# Patient Record
Sex: FEMALE | Race: OTHER | HISPANIC OR LATINO | URBAN - METROPOLITAN AREA
[De-identification: names, ages, dates, MRNs, and addresses within clinical notes are randomized per-mention and may not be internally consistent; named-entity substitution may affect disease eponyms.]

---

## 2018-08-30 ENCOUNTER — INPATIENT (INPATIENT)
Facility: HOSPITAL | Age: 33
LOS: 4 days | Discharge: HOME | DRG: 683 | End: 2018-09-04
Attending: INTERNAL MEDICINE | Admitting: HOSPITALIST
Payer: MEDICAID

## 2018-08-30 VITALS — WEIGHT: 113.1 LBS

## 2018-08-30 LAB
ALBUMIN SERPL ELPH-MCNC: 4.1 G/DL — SIGNIFICANT CHANGE UP (ref 3.3–5.2)
ALP SERPL-CCNC: 75 U/L — SIGNIFICANT CHANGE UP (ref 40–120)
ALT FLD-CCNC: 12 U/L — SIGNIFICANT CHANGE UP
ANION GAP SERPL CALC-SCNC: 15 MMOL/L — SIGNIFICANT CHANGE UP (ref 5–17)
APTT BLD: 31.6 SEC — SIGNIFICANT CHANGE UP (ref 27.5–37.4)
AST SERPL-CCNC: 9 U/L — SIGNIFICANT CHANGE UP
BASOPHILS # BLD AUTO: 0 K/UL — SIGNIFICANT CHANGE UP (ref 0–0.2)
BASOPHILS NFR BLD AUTO: 0.2 % — SIGNIFICANT CHANGE UP (ref 0–2)
BILIRUB SERPL-MCNC: 0.2 MG/DL — LOW (ref 0.4–2)
BUN SERPL-MCNC: 65 MG/DL — HIGH (ref 8–20)
CALCIUM SERPL-MCNC: 8.8 MG/DL — SIGNIFICANT CHANGE UP (ref 8.6–10.2)
CHLORIDE SERPL-SCNC: 102 MMOL/L — SIGNIFICANT CHANGE UP (ref 98–107)
CK SERPL-CCNC: 118 U/L — SIGNIFICANT CHANGE UP (ref 25–170)
CO2 SERPL-SCNC: 18 MMOL/L — LOW (ref 22–29)
CREAT SERPL-MCNC: 6.05 MG/DL — HIGH (ref 0.5–1.3)
EOSINOPHIL # BLD AUTO: 0 K/UL — SIGNIFICANT CHANGE UP (ref 0–0.5)
EOSINOPHIL NFR BLD AUTO: 0.7 % — SIGNIFICANT CHANGE UP (ref 0–6)
GLUCOSE SERPL-MCNC: 95 MG/DL — SIGNIFICANT CHANGE UP (ref 70–115)
HCT VFR BLD CALC: 24.9 % — LOW (ref 37–47)
HGB BLD-MCNC: 8.1 G/DL — LOW (ref 12–16)
INR BLD: 1.05 RATIO — SIGNIFICANT CHANGE UP (ref 0.88–1.16)
LYMPHOCYTES # BLD AUTO: 1.5 K/UL — SIGNIFICANT CHANGE UP (ref 1–4.8)
LYMPHOCYTES # BLD AUTO: 36 % — SIGNIFICANT CHANGE UP (ref 20–55)
MCHC RBC-ENTMCNC: 27.1 PG — SIGNIFICANT CHANGE UP (ref 27–31)
MCHC RBC-ENTMCNC: 32.5 G/DL — SIGNIFICANT CHANGE UP (ref 32–36)
MCV RBC AUTO: 83.3 FL — SIGNIFICANT CHANGE UP (ref 81–99)
MONOCYTES # BLD AUTO: 0.6 K/UL — SIGNIFICANT CHANGE UP (ref 0–0.8)
MONOCYTES NFR BLD AUTO: 13.5 % — HIGH (ref 3–10)
NEUTROPHILS # BLD AUTO: 2 K/UL — SIGNIFICANT CHANGE UP (ref 1.8–8)
NEUTROPHILS NFR BLD AUTO: 49.6 % — SIGNIFICANT CHANGE UP (ref 37–73)
NT-PROBNP SERPL-SCNC: 1712 PG/ML — HIGH (ref 0–300)
PLATELET # BLD AUTO: 165 K/UL — SIGNIFICANT CHANGE UP (ref 150–400)
POTASSIUM SERPL-MCNC: 5.4 MMOL/L — HIGH (ref 3.5–5.3)
POTASSIUM SERPL-SCNC: 5.4 MMOL/L — HIGH (ref 3.5–5.3)
PROT SERPL-MCNC: 7.5 G/DL — SIGNIFICANT CHANGE UP (ref 6.6–8.7)
PROTHROM AB SERPL-ACNC: 11.6 SEC — SIGNIFICANT CHANGE UP (ref 9.8–12.7)
RBC # BLD: 2.99 M/UL — LOW (ref 4.4–5.2)
RBC # FLD: 14.1 % — SIGNIFICANT CHANGE UP (ref 11–15.6)
SODIUM SERPL-SCNC: 135 MMOL/L — SIGNIFICANT CHANGE UP (ref 135–145)
TROPONIN T SERPL-MCNC: <0.01 NG/ML — SIGNIFICANT CHANGE UP (ref 0–0.06)
WBC # BLD: 4.1 K/UL — LOW (ref 4.8–10.8)
WBC # FLD AUTO: 4.1 K/UL — LOW (ref 4.8–10.8)

## 2018-08-30 PROCEDURE — 99223 1ST HOSP IP/OBS HIGH 75: CPT | Mod: GC

## 2018-08-30 PROCEDURE — 93010 ELECTROCARDIOGRAM REPORT: CPT

## 2018-08-30 PROCEDURE — 99285 EMERGENCY DEPT VISIT HI MDM: CPT

## 2018-08-30 PROCEDURE — 71045 X-RAY EXAM CHEST 1 VIEW: CPT | Mod: 26

## 2018-08-30 RX ORDER — HYDRALAZINE HCL 50 MG
10 TABLET ORAL ONCE
Qty: 0 | Refills: 0 | Status: COMPLETED | OUTPATIENT
Start: 2018-08-30 | End: 2018-08-30

## 2018-08-30 RX ORDER — DEXTROSE 50 % IN WATER 50 %
50 SYRINGE (ML) INTRAVENOUS ONCE
Qty: 0 | Refills: 0 | Status: COMPLETED | OUTPATIENT
Start: 2018-08-30 | End: 2018-08-30

## 2018-08-30 RX ADMIN — Medication 10 MILLIGRAM(S): at 23:59

## 2018-08-30 NOTE — ED ADULT NURSE NOTE - NSIMPLEMENTINTERV_GEN_ALL_ED
Implemented All Universal Safety Interventions:  [place] to call system. Call bell, personal items and telephone within reach. Instruct patient to call for assistance. Room bathroom lighting operational. Non-slip footwear when patient is off stretcher. Physically safe environment: no spills, clutter or unnecessary equipment. Stretcher in lowest position, wheels locked, appropriate side rails in place.

## 2018-08-30 NOTE — ED ADULT NURSE REASSESSMENT NOTE - NS ED NURSE REASSESS COMMENT FT1
patient denies any complaints of dizziness states that she has a slight headache, patient to be medicated for hypertension. will continue to monitor patient

## 2018-08-30 NOTE — ED STATDOCS - PROGRESS NOTE DETAILS
34 y/o F pt with hx fo HTN presents to ED for abnormal kidney levels. Pt sent by doctor to ED. Pt is newly diagnosed hypertensive and has been on bp medication x 7 days. Pt is seen at clinic which is where prescription was given. Pt went 5 days ago for routine visit. She was called today secondary to bloodwork and told she had abnormality related to kidney function. Protocol orders have been entered following a focused evaluation in intake. Pt to be placed in the main ED for further evaluation by another provider.

## 2018-08-30 NOTE — ED PROVIDER NOTE - PMH
HTN (hypertension) Hypertension, unspecified type    Migraine without aura, not intractable, without status migrainosus

## 2018-08-30 NOTE — ED PROVIDER NOTE - ATTENDING CONTRIBUTION TO CARE
34yo female with pmh of HTN presents sent from PMD for kidney failure. Pt denies fevers/chills, ha, loc, focal neuro deficits, cp/sob/palp, cough, abd pain/n/v/d, urinary symptoms, recent travel and sick contacts. PE - NAD, CV/Resp/Abd/Neuro/Skin wnl. Will re check labs and if pt in [place] will admit

## 2018-08-30 NOTE — ED ADULT TRIAGE NOTE - CHIEF COMPLAINT QUOTE
Sent by clinic because "kidney numbers are not good." Stated went to clinic on 8/23/18, was called today & told to come to ED for further evaluation. Patient A&Ox4, denies any pain or discomfort.

## 2018-08-30 NOTE — ED PROVIDER NOTE - OBJECTIVE STATEMENT
Patient is a 34 y/o female with a pmhx of HTN presenting for evaluation. Patient states she was diagnosed with HTN July 2nd by dr. saunders. Patient states she was started on propanolol, but was recently switched to lisinopril on august 24th due to side effects of propanolol. Patient states she had routine blood work drawn at her last visit and today she received a phone call from her doctor that there was " a problem with her kidney function and to go to the ED". Patient denies taking any other medications than lisinopril. Patient denies cardiac hx.

## 2018-08-30 NOTE — ED ADULT NURSE NOTE - OBJECTIVE STATEMENT
Patient stated she went on 8/23/18 to have checkup and was called today to come to ER "because something is wrong with my kidneys". Denies any symptoms.

## 2018-08-30 NOTE — ED ADULT NURSE NOTE - LANGUAGE ASSISTANCE NEEDED
Yes-Patient/Caregiver accepts free interpretation services... ED  Ashley at bedside to translate/Yes-Patient/Caregiver accepts free interpretation services...

## 2018-08-31 ENCOUNTER — RESULT REVIEW (OUTPATIENT)
Age: 33
End: 2018-08-31

## 2018-08-31 DIAGNOSIS — E87.5 HYPERKALEMIA: ICD-10-CM

## 2018-08-31 DIAGNOSIS — N91.2 AMENORRHEA, UNSPECIFIED: ICD-10-CM

## 2018-08-31 DIAGNOSIS — N19 UNSPECIFIED KIDNEY FAILURE: ICD-10-CM

## 2018-08-31 DIAGNOSIS — Z29.9 ENCOUNTER FOR PROPHYLACTIC MEASURES, UNSPECIFIED: ICD-10-CM

## 2018-08-31 DIAGNOSIS — D64.9 ANEMIA, UNSPECIFIED: ICD-10-CM

## 2018-08-31 DIAGNOSIS — D63.8 ANEMIA IN OTHER CHRONIC DISEASES CLASSIFIED ELSEWHERE: ICD-10-CM

## 2018-08-31 DIAGNOSIS — N17.9 ACUTE KIDNEY FAILURE, UNSPECIFIED: ICD-10-CM

## 2018-08-31 DIAGNOSIS — Z98.51 TUBAL LIGATION STATUS: Chronic | ICD-10-CM

## 2018-08-31 DIAGNOSIS — I10 ESSENTIAL (PRIMARY) HYPERTENSION: ICD-10-CM

## 2018-08-31 LAB
ANION GAP SERPL CALC-SCNC: 16 MMOL/L — SIGNIFICANT CHANGE UP (ref 5–17)
ANION GAP SERPL CALC-SCNC: 17 MMOL/L — SIGNIFICANT CHANGE UP (ref 5–17)
APAP SERPL-MCNC: <7.5 UG/ML — LOW (ref 10–26)
APPEARANCE UR: CLEAR — SIGNIFICANT CHANGE UP
BACTERIA # UR AUTO: ABNORMAL
BILIRUB UR-MCNC: NEGATIVE — SIGNIFICANT CHANGE UP
BUN SERPL-MCNC: 62 MG/DL — HIGH (ref 8–20)
BUN SERPL-MCNC: 62 MG/DL — HIGH (ref 8–20)
CALCIUM SERPL-MCNC: 8.5 MG/DL — LOW (ref 8.6–10.2)
CALCIUM SERPL-MCNC: 9 MG/DL — SIGNIFICANT CHANGE UP (ref 8.6–10.2)
CHLORIDE SERPL-SCNC: 104 MMOL/L — SIGNIFICANT CHANGE UP (ref 98–107)
CHLORIDE SERPL-SCNC: 109 MMOL/L — HIGH (ref 98–107)
CHLORIDE UR-SCNC: 41 MMOL/L — SIGNIFICANT CHANGE UP
CHOLEST SERPL-MCNC: 97 MG/DL — LOW (ref 110–199)
CK SERPL-CCNC: 122 U/L — SIGNIFICANT CHANGE UP (ref 25–170)
CO2 SERPL-SCNC: 18 MMOL/L — LOW (ref 22–29)
CO2 SERPL-SCNC: 18 MMOL/L — LOW (ref 22–29)
COLOR SPEC: YELLOW — SIGNIFICANT CHANGE UP
CREAT ?TM UR-MCNC: 19 MG/DL — SIGNIFICANT CHANGE UP
CREAT SERPL-MCNC: 5.62 MG/DL — HIGH (ref 0.5–1.3)
CREAT SERPL-MCNC: 5.85 MG/DL — HIGH (ref 0.5–1.3)
CRP SERPL-MCNC: <0.4 MG/DL — SIGNIFICANT CHANGE UP (ref 0–0.4)
DIFF PNL FLD: ABNORMAL
EPI CELLS # UR: ABNORMAL
ERYTHROCYTE [SEDIMENTATION RATE] IN BLOOD: 41 MM/HR — HIGH (ref 0–20)
FERRITIN SERPL-MCNC: 58 NG/ML — SIGNIFICANT CHANGE UP (ref 15–150)
FSH SERPL-MCNC: 5.7 IU/L — SIGNIFICANT CHANGE UP
GLUCOSE SERPL-MCNC: 81 MG/DL — SIGNIFICANT CHANGE UP (ref 70–115)
GLUCOSE SERPL-MCNC: 83 MG/DL — SIGNIFICANT CHANGE UP (ref 70–115)
GLUCOSE UR QL: NEGATIVE MG/DL — SIGNIFICANT CHANGE UP
HBA1C BLD-MCNC: 5.2 % — SIGNIFICANT CHANGE UP (ref 4–5.6)
HBV CORE AB SER-ACNC: SIGNIFICANT CHANGE UP
HBV SURFACE AB SER-ACNC: SIGNIFICANT CHANGE UP
HBV SURFACE AG SER-ACNC: SIGNIFICANT CHANGE UP
HCG SERPL-ACNC: <5 MIU/ML — SIGNIFICANT CHANGE UP
HCT VFR BLD CALC: 24.5 % — LOW (ref 37–47)
HCV AB S/CO SERPL IA: 0.2 S/CO — SIGNIFICANT CHANGE UP
HCV AB SERPL-IMP: SIGNIFICANT CHANGE UP
HDLC SERPL-MCNC: 39 MG/DL — LOW
HGB BLD-MCNC: 7.9 G/DL — LOW (ref 12–16)
IRON SATN MFR SERPL: 19 % — SIGNIFICANT CHANGE UP (ref 14–50)
IRON SATN MFR SERPL: 45 UG/DL — SIGNIFICANT CHANGE UP (ref 37–145)
KETONES UR-MCNC: NEGATIVE — SIGNIFICANT CHANGE UP
LDH SERPL L TO P-CCNC: 206 U/L — HIGH (ref 98–192)
LEUKOCYTE ESTERASE UR-ACNC: NEGATIVE — SIGNIFICANT CHANGE UP
LH SERPL-ACNC: 3.6 IU/L — SIGNIFICANT CHANGE UP
LIPID PNL WITH DIRECT LDL SERPL: 45 MG/DL — SIGNIFICANT CHANGE UP
MAGNESIUM SERPL-MCNC: 2.3 MG/DL — SIGNIFICANT CHANGE UP (ref 1.6–2.6)
MCHC RBC-ENTMCNC: 27 PG — SIGNIFICANT CHANGE UP (ref 27–31)
MCHC RBC-ENTMCNC: 32.2 G/DL — SIGNIFICANT CHANGE UP (ref 32–36)
MCV RBC AUTO: 83.6 FL — SIGNIFICANT CHANGE UP (ref 81–99)
NITRITE UR-MCNC: NEGATIVE — SIGNIFICANT CHANGE UP
NT-PROBNP SERPL-SCNC: 1983 PG/ML — HIGH (ref 0–300)
OSMOLALITY UR: 175 MOSM/KG — LOW (ref 300–1000)
PH UR: 7 — SIGNIFICANT CHANGE UP (ref 5–8)
PHOSPHATE SERPL-MCNC: 4.5 MG/DL — SIGNIFICANT CHANGE UP (ref 2.4–4.7)
PLATELET # BLD AUTO: 168 K/UL — SIGNIFICANT CHANGE UP (ref 150–400)
POTASSIUM SERPL-MCNC: 4.9 MMOL/L — SIGNIFICANT CHANGE UP (ref 3.5–5.3)
POTASSIUM SERPL-MCNC: 5.2 MMOL/L — SIGNIFICANT CHANGE UP (ref 3.5–5.3)
POTASSIUM SERPL-SCNC: 4.9 MMOL/L — SIGNIFICANT CHANGE UP (ref 3.5–5.3)
POTASSIUM SERPL-SCNC: 5.2 MMOL/L — SIGNIFICANT CHANGE UP (ref 3.5–5.3)
PROLACTIN SERPL-MCNC: 198.3 NG/ML — HIGH (ref 3.4–24.1)
PROT ?TM UR-MCNC: 142 MG/DL — HIGH (ref 0–12)
PROT UR-MCNC: 100 MG/DL
PROT/CREAT UR-RTO: 7.5 RATIO — HIGH
RBC # BLD: 2.93 M/UL — LOW (ref 4.4–5.2)
RBC # FLD: 14 % — SIGNIFICANT CHANGE UP (ref 11–15.6)
RBC CASTS # UR COMP ASSIST: ABNORMAL /HPF (ref 0–4)
SODIUM SERPL-SCNC: 138 MMOL/L — SIGNIFICANT CHANGE UP (ref 135–145)
SODIUM SERPL-SCNC: 144 MMOL/L — SIGNIFICANT CHANGE UP (ref 135–145)
SODIUM UR-SCNC: 49 MMOL/L — SIGNIFICANT CHANGE UP
SP GR SPEC: 1 — LOW (ref 1.01–1.02)
T3 SERPL-MCNC: 88 NG/DL — SIGNIFICANT CHANGE UP (ref 80–200)
T4 AB SER-ACNC: 5.6 UG/DL — SIGNIFICANT CHANGE UP (ref 4.5–12)
TIBC SERPL-MCNC: 209 UG/DL — LOW (ref 220–430)
TOTAL CHOLESTEROL/HDL RATIO MEASUREMENT: 2 RATIO — LOW (ref 3.3–7.1)
TRANSFERRIN SERPL-MCNC: 142 MG/DL — LOW (ref 192–382)
TRIGL SERPL-MCNC: 66 MG/DL — SIGNIFICANT CHANGE UP (ref 10–200)
TSH SERPL-MCNC: 2.15 UIU/ML — SIGNIFICANT CHANGE UP (ref 0.27–4.2)
UROBILINOGEN FLD QL: NEGATIVE MG/DL — SIGNIFICANT CHANGE UP
WBC # BLD: 3.8 K/UL — LOW (ref 4.8–10.8)
WBC # FLD AUTO: 3.8 K/UL — LOW (ref 4.8–10.8)
WBC UR QL: SIGNIFICANT CHANGE UP

## 2018-08-31 PROCEDURE — 88305 TISSUE EXAM BY PATHOLOGIST: CPT | Mod: 26

## 2018-08-31 PROCEDURE — 70551 MRI BRAIN STEM W/O DYE: CPT | Mod: 26

## 2018-08-31 PROCEDURE — 88350 IMFLUOR EA ADDL 1ANTB STN PX: CPT | Mod: 26

## 2018-08-31 PROCEDURE — 76770 US EXAM ABDO BACK WALL COMP: CPT | Mod: 26

## 2018-08-31 PROCEDURE — 88346 IMFLUOR 1ST 1ANTB STAIN PX: CPT | Mod: 26

## 2018-08-31 PROCEDURE — 99233 SBSQ HOSP IP/OBS HIGH 50: CPT | Mod: GC

## 2018-08-31 PROCEDURE — 99223 1ST HOSP IP/OBS HIGH 75: CPT

## 2018-08-31 PROCEDURE — 88313 SPECIAL STAINS GROUP 2: CPT | Mod: 26

## 2018-08-31 PROCEDURE — 88312 SPECIAL STAINS GROUP 1: CPT | Mod: 26

## 2018-08-31 PROCEDURE — 88348 ELECTRON MICROSCOPY DX: CPT | Mod: 26

## 2018-08-31 RX ORDER — ACETAMINOPHEN 500 MG
650 TABLET ORAL EVERY 6 HOURS
Qty: 0 | Refills: 0 | Status: DISCONTINUED | OUTPATIENT
Start: 2018-08-31 | End: 2018-09-04

## 2018-08-31 RX ORDER — SODIUM BICARBONATE 1 MEQ/ML
0.18 SYRINGE (ML) INTRAVENOUS
Qty: 75 | Refills: 0 | Status: DISCONTINUED | OUTPATIENT
Start: 2018-08-31 | End: 2018-09-04

## 2018-08-31 RX ORDER — FERROUS SULFATE 325(65) MG
325 TABLET ORAL DAILY
Qty: 0 | Refills: 0 | Status: DISCONTINUED | OUTPATIENT
Start: 2018-08-31 | End: 2018-09-04

## 2018-08-31 RX ORDER — SODIUM CHLORIDE 9 MG/ML
1000 INJECTION, SOLUTION INTRAVENOUS
Qty: 0 | Refills: 0 | Status: DISCONTINUED | OUTPATIENT
Start: 2018-08-31 | End: 2018-08-31

## 2018-08-31 RX ORDER — INSULIN HUMAN 100 [IU]/ML
2 INJECTION, SOLUTION SUBCUTANEOUS ONCE
Qty: 0 | Refills: 0 | Status: COMPLETED | OUTPATIENT
Start: 2018-08-31 | End: 2018-08-31

## 2018-08-31 RX ORDER — CALCIUM GLUCONATE 100 MG/ML
1 VIAL (ML) INTRAVENOUS ONCE
Qty: 0 | Refills: 0 | Status: COMPLETED | OUTPATIENT
Start: 2018-08-31 | End: 2018-08-31

## 2018-08-31 RX ORDER — INSULIN HUMAN 100 [IU]/ML
2 INJECTION, SOLUTION SUBCUTANEOUS ONCE
Qty: 0 | Refills: 0 | Status: DISCONTINUED | OUTPATIENT
Start: 2018-08-31 | End: 2018-08-31

## 2018-08-31 RX ORDER — AMLODIPINE BESYLATE 2.5 MG/1
10 TABLET ORAL DAILY
Qty: 0 | Refills: 0 | Status: DISCONTINUED | OUTPATIENT
Start: 2018-08-31 | End: 2018-09-04

## 2018-08-31 RX ORDER — SODIUM CHLORIDE 9 MG/ML
1000 INJECTION, SOLUTION INTRAVENOUS ONCE
Qty: 0 | Refills: 0 | Status: COMPLETED | OUTPATIENT
Start: 2018-08-31 | End: 2018-08-31

## 2018-08-31 RX ORDER — OXYCODONE AND ACETAMINOPHEN 5; 325 MG/1; MG/1
2 TABLET ORAL EVERY 6 HOURS
Qty: 0 | Refills: 0 | Status: DISCONTINUED | OUTPATIENT
Start: 2018-08-31 | End: 2018-09-04

## 2018-08-31 RX ADMIN — Medication 50 MILLILITER(S): at 00:31

## 2018-08-31 RX ADMIN — Medication 125 MEQ/KG/HR: at 22:48

## 2018-08-31 RX ADMIN — OXYCODONE AND ACETAMINOPHEN 2 TABLET(S): 5; 325 TABLET ORAL at 19:47

## 2018-08-31 RX ADMIN — INSULIN HUMAN 2 UNIT(S): 100 INJECTION, SOLUTION SUBCUTANEOUS at 00:31

## 2018-08-31 RX ADMIN — SODIUM CHLORIDE 2000 MILLILITER(S): 9 INJECTION, SOLUTION INTRAVENOUS at 02:07

## 2018-08-31 RX ADMIN — Medication 125 MEQ/KG/HR: at 12:01

## 2018-08-31 RX ADMIN — SODIUM CHLORIDE 150 MILLILITER(S): 9 INJECTION, SOLUTION INTRAVENOUS at 04:12

## 2018-08-31 RX ADMIN — OXYCODONE AND ACETAMINOPHEN 2 TABLET(S): 5; 325 TABLET ORAL at 18:42

## 2018-08-31 NOTE — H&P ADULT - NSHPSOCIALHISTORY_GEN_ALL_CORE
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  Denies any alcohol, tobacco or illicit drug use.  LMP: 04/2018, s/p BTL

## 2018-08-31 NOTE — CONSULT NOTE ADULT - SUBJECTIVE AND OBJECTIVE BOX
[facility] DIVISION OF KIDNEY DISEASES AND HYPERTENSION -- INITIAL CONSULT NOTE  --------------------------------------------------------------------------------  HPI:  Pt is a 33 year old female with history of HTN sent from [facility] [place] for elevated Cr. Patient noted to have BP of 187/109 ; stopped taking her propranolol due to dry mouth symptoms; mifraines; then started on lisinopril 10mg daily. Patient was called and told to come to ER upon blood work. Pt at this time was also taking ibuprofen for her migraines during this time. Patient seen and examined; hensley catheter placed; no complaints; comfortable.    PAST HISTORY  --------------------------------------------------------------------------------  PAST MEDICAL & SURGICAL HISTORY:  Hypertension, unspecified type  Migraine without aura, not intractable, without status migrainosus  History of bilateral tubal ligation    FAMILY HISTORY:  Family history of hypertension (Mother)    PAST SOCIAL HISTORY:    ALLERGIES & MEDICATIONS  --------------------------------------------------------------------------------  Allergies    No Known Allergies    Intolerances      Standing Inpatient Medications  amLODIPine   Tablet 10 milliGRAM(s) Oral daily  sodium bicarbonate  Infusion 0.183 mEq/kG/Hr IV Continuous <Continuous>    PRN Inpatient Medications      REVIEW OF SYSTEMS  --------------------------------------------------------------------------------  Gen: No weight changes, fatigue, fevers/chills, weakness  Skin: No rashes  Head/Eyes/Ears/Mouth: No headache; Normal hearing; Normal vision w/o blurriness; No sinus pain/discomfort, sore throat  Respiratory: No dyspnea, cough, wheezing, hemoptysis  CV: No chest pain, PND, orthopnea  GI: No abdominal pain, diarrhea, constipation, nausea, vomiting, melena, hematochezia  : No increased frequency, dysuria, hematuria, nocturia  MSK: No joint pain/swelling; no back pain; no edema  Neuro: No dizziness/lightheadedness, weakness, seizures, numbness, tingling  Heme: No easy bruising or bleeding  Endo: No heat/cold intolerance  Psych: No significant nervousness, anxiety, stress, depression    All other systems were reviewed and are negative, except as noted.    VITALS/PHYSICAL EXAM  --------------------------------------------------------------------------------  T(C): 37.1 (08-31-18 @ 07:36), Max: 37.1 (08-31-18 @ 07:36)  HR: 68 (08-31-18 @ 07:36) (68 - 102)  BP: 119/74 (08-31-18 @ 07:36) (119/74 - 176/111)  RR: 18 (08-31-18 @ 07:36) (18 - 21)  SpO2: 100% (08-31-18 @ 07:36) (100% - 100%)  Wt(kg): --    Weight (kg): 51.3 (08-30-18 @ 20:09)      08-30-18 @ 07:01  -  08-31-18 @ 07:00  --------------------------------------------------------  IN: 1600 mL / OUT: 1300 mL / NET: 300 mL      Physical Exam:  	Gen: NAD, well-appearing  	HEENT: PERRL, supple neck, clear oropharynx  	Pulm: CTA B/L  	CV: RRR, S1S2; no rub  	Back: No spinal or CVA tenderness; no sacral edema  	Abd: +BS, soft, nontender/nondistended  	: hensley+   	UE: Warm, FROM, no clubbing, intact strength; no edema; no asterixis  	LE: Warm, FROM, no clubbing, intact strength; no edema  	Neuro: No focal deficits, intact gait  	Psych: Normal affect and mood  	Skin: Warm, without rashes  	Vascular access:    LABS/STUDIES  --------------------------------------------------------------------------------              8.1    4.1   >-----------<  165      [08-30-18 @ 22:42]              24.9     138  |  104  |  62.0  ----------------------------<  83      [08-31-18 @ 06:22]  5.2   |  18.0  |  5.62        Ca     8.5     [08-31-18 @ 06:22]      Mg     2.3     [08-31-18 @ 06:22]      Phos  4.5     [08-31-18 @ 06:22]    TPro  7.5  /  Alb  4.1  /  TBili  0.2  /  DBili  x   /  AST  9   /  ALT  12  /  AlkPhos  75  [08-30-18 @ 22:42]    PT/INR: PT 11.6 , INR 1.05       [08-30-18 @ 22:42]  PTT: 31.6       [08-30-18 @ 22:42]    Troponin <0.01      [08-30-18 @ 22:42]        [08-31-18 @ 06:22]    Creatinine Trend:  SCr 5.62 [08-31 @ 06:22]  SCr 6.05 [08-30 @ 22:42]    Urinalysis - [08-31-18 @ 02:25]      Color Yellow / Appearance Clear / SG 1.005 / pH 7.0      Gluc Negative / Ketone Negative  / Bili Negative / Urobili Negative       Blood Moderate / Protein 100 / Leuk Est Negative / Nitrite Negative      RBC 6-10 / WBC 0-2 / Hyaline  / Gran  / Sq Epi  / Non Sq Epi Moderate / Bacteria Few    Urine Creatinine 19      [08-31-18 @ 02:26]  Urine Protein 142.0      [08-31-18 @ 02:26]  Urine Sodium 49      [08-31-18 @ 02:24]  Urine Chloride 41      [08-31-18 @ 02:24]  Urine Osmolality 175      [08-31-18 @ 02:25]    Iron 45, TIBC 209, %sat 19      [08-31-18 @ 06:22]  Ferritin 58      [08-31-18 @ 06:22]  HbA1c 5.2      [08-31-18 @ 06:22]  TSH 2.15      [08-31-18 @ 06:22]  Lipid: chol 97, TG 66, HDL 39, LDL 45      [08-31-18 @ 06:22]

## 2018-08-31 NOTE — ED ADULT NURSE REASSESSMENT NOTE - NS ED NURSE REASSESS COMMENT FT1
[facility] placed by JEAN MARIE Quinones per MD order, Report given to Lisa AJ in holding room, patient to be transferred to

## 2018-08-31 NOTE — ED ADULT NURSE REASSESSMENT NOTE - NS ED NURSE REASSESS COMMENT FT1
Dr. Bethea at bedside, hensley catheter placed by JEAN MARIE Quinones and Dr. Bethea, Normosol bolus infusing, patient resting comfortably at this time, will continue to monitor patient

## 2018-08-31 NOTE — H&P ADULT - PROBLEM SELECTOR PLAN 2
- Hold home Lisinopril at the moment.  - Will add amlodipine 5 mg PO and titrate as needed. - Hold home Lisinopril at the moment.  - s/p x1 dose of Hydralazine 10mg IVP at the ED  - Will add amlodipine 10 mg PO and titrate as needed. - Hold home Lisinopril at the moment.  - s/p x1 dose of Hydralazine 10mg IVP at the ED  - Amlodipine 10 mg PO and titrate as needed.  - Patient's history of new onset self resolving episodic headaches, raises the possibility of pheochromocytoma, will follow up results of urine metanephrines.

## 2018-08-31 NOTE — PROGRESS NOTE ADULT - ASSESSMENT
Patient is a 33 years old female with PMHx of recently diagnosed hypertension and migraines treated with Propanolol for two weeks, is referred from [facility] due to elevated creatinine, concerning of acute on chronic kidney failure.    Workup id done to r/o vasculitis in the context of a your patient with hypertension and kidney injury w/o evident risk factors or cause. The patient has shown slow improvement of renal function and renal bx was performed for further analysis. Patient presented mild alice incisional haemorrhagia after the procedure, will follow closely.  Nephrology is following the case. Slow clinical improvement      1. Acute on chronic kidney disease  - BUN:Cr ratio 10.7, likely primary renal in origin.  - Vasculitis concern ESR IS ELEVATED -->F/u ANCA, ALBA, antidsDNA, CRP  - c/w Gentle hydration.  - Hyperkalemia management as below.  - Anemia management as below.  -S/P renal Bx -->F/U results  - F/U Renal doppler US in am  - U/A with microscopy.  - Hold nephrotoxic medications.  -c/w DASH/TLC diet with Renal restrictions.  -- Nephrology consultation noted and appreciated      2.  Hypertension  - Improving  -Holding Lisinopril at the moment.  - s/p x1 dose of Hydralazine 10mg IVP at the ED  - Amlodipine 10 mg PO and titrate as needed.  - -f/u results of urine metanephrines.     3.Hyperkalemia   - S/p Insulin and 25 mg of Dextrose given in the ED.  - EKG T wave changes ot QTc abnormalities  - K+ level <7mEq/L  - Repeat K+, hold on Ca+2 gluconate    4. Prolactinemia  -Prolactin >200  -MRI head shows no lesions. -->will Hold Dynamic pituitary study due kidney disease.  -Will follow up with Endocrinology.     5. Anemia of chronic disease   - Low TIBC, with low-normal Iron, ferritin and trasferrin  -Normocytic normochromic  - Likely secondary to CKD  - f/u Iron studies.       6. Amenorrhea  - Likely secondary to hyperprolactinemia.  - Will trend LH, FSH and prolactin.  - Will need brain imaging as part of investigation for adenoma.     7. DVT prophylaxis  - [place] prediction Score: 0. No need for DVT prophylaxis. 33 years old female with Hx of recently diagnosed hypertension and migraines was treated with Propanolol for two weeks and thereafter prescribed lisinopril due to intolerance of propranolol as well as incidental finding of elevated prolactin level on labs with noted cessation of menstruation since April 2018; pt was referred from [facility] due to elevated creatinine, with concern for acute vs acute on chronic kidney failure unclear etiology. Renal US shows medical renal dz, so possible underlying CKD. Serology for differential diagnosis sent and testing. Nephrology consulted and recommended renal bx, contacted IR and pt pending renal bx.     1. Acute on chronic kidney disease  - pt asymptomatic   - BUN:Cr ratio 10.7  - bun/ cr with slight down trend   - c/w IVF   - Hyperkalemia improved   - c/w monitoring renal function   - Anemia management as below   - F/U Renal doppler US in am   - renal US shows chronic medical renal dz component of ckd   - U/A with microscopy  protein: 100    -protein/cr ratio elevated   - ESR elevated   -CRP wnl   - f/u ALBA, C3/ C4/ C5, ds DNA, metanephrines   - c/w hensley catheter   - Hold nephrotoxic medications   - hepatitis panel (just in case needed for HD placement; will not place PPD unless pt needs HD)   - Nephrology consul noted and appreciated and recommended renal Bx  - IR consulted for bx     Hypertension  - Improving  - Holding Lisinopril at the moment due to LAITH   - c/w Amlodipine 10 mg PO and titrate as needed.    Anemia of chronic disease   - h/h stable   - Low TIBC, with low-normal Iron, ferritin and trasferrin   - given slightly lower ferritin could benefit from iron therapy   - Normocytic normochromic  -c/w ferrous sulfate 325mg po qd   - epogen as per nephro     Amenorrhea  - Likely secondary to hyperprolactinemia  - Will trend LH, FSH and prolactin - labs testing   - MRI brain with no evidence of pituitary adenoma - recommends dynamic pituitary study with contrast but pt is not able to receive contrast due to LAITH.   - Will consult endocrinology     DVT prophylaxis  - Pt is ambulating   - scd boots b/l       Dispo: When pt is medically stable to return to home. Based on renal functioning and if worsens need for HD may need community HD center. 33 years old female with Hx of recently diagnosed hypertension and migraines was treated with Propanolol for two weeks and thereafter prescribed lisinopril due to intolerance of propranolol as well as incidental finding of elevated prolactin level on labs with noted cessation of menstruation since April 2018; pt was referred from [facility] due to elevated creatinine, with concern for acute vs acute on chronic kidney failure unclear etiology. Renal US shows medical renal dz, so possible underlying CKD. Serology for differential diagnosis sent and testing. Nephrology consulted and recommended renal bx, contacted IR and pt pending renal bx.     Acute on chronic kidney disease  - pt asymptomatic   - BUN:Cr ratio 10.7  - bun/ cr with slight down trend   - c/w IVF   - Hyperkalemia improved   - c/w monitoring renal function   - Anemia management as below   - F/U Renal doppler US in am   - renal US shows chronic medical renal dz component of ckd   - U/A with microscopy  protein: 100    -protein/cr ratio elevated   - ESR elevated   -CRP wnl   - f/u ALBA, C3/ C4/ C5, ds DNA, metanephrines   - c/w hensley catheter   - Hold nephrotoxic medications   - hepatitis panel (just in case needed for HD placement; will not place PPD unless pt needs HD)   - Nephrology consul noted and appreciated and recommended renal Bx  - IR consulted for bx     Hypertension  - Improving  - Holding Lisinopril at the moment due to LAITH   - c/w Amlodipine 10 mg PO and titrate as needed.    Anemia of chronic disease   - h/h stable   - Low TIBC, with low-normal Iron, ferritin and trasferrin   - given slightly lower ferritin could benefit from iron therapy   - Normocytic normochromic  -c/w ferrous sulfate 325mg po qd   - epogen as per nephro     Amenorrhea  - Likely secondary to hyperprolactinemia  - Will trend LH, FSH and prolactin - labs testing   - MRI brain with no evidence of pituitary adenoma - recommends dynamic pituitary study with contrast but pt is not able to receive contrast due to LAITH.   - Will consult endocrinology     Migraine   -c/w tylenol prn   -currently pt is asx     DVT prophylaxis  - Pt is ambulating   - scd boots b/l       Dispo: When pt is medically stable to return to home. Based on renal functioning and if worsens need for HD may need community HD center.

## 2018-08-31 NOTE — H&P ADULT - PROBLEM SELECTOR PLAN 4
- Normocytic normochromic  - Likely secondary to CKD  - Iron studies.  - Type and Screen - Normocytic normochromic  - Likely secondary to CKD  - f/u Iron studies.  - Type and Screen

## 2018-08-31 NOTE — H&P ADULT - NSHPPHYSICALEXAM_GEN_ALL_CORE
Vital Signs Last 24 Hrs  T(C): 36.8 (30 Aug 2018 21:07), Max: 36.8 (30 Aug 2018 21:07)  T(F): 98.2 (30 Aug 2018 21:07), Max: 98.2 (30 Aug 2018 21:07)  HR: 96 (31 Aug 2018 00:35) (78 - 96)  BP: 169/79 (31 Aug 2018 01:01) (169/79 - 176/111)  RR: 20 (31 Aug 2018 01:01) (18 - 21)  SpO2: 100% (31 Aug 2018 01:01) (100% - 100%)    General: Patient is well-groomed, well-developed female, resting comfortably in bed in no acute distress.  HEENT: Normocephalic, atraumatic, nose is clear, moist mucous membranes.  Neck: No JVD, no carotid bruit.  Respiratory: Normal respiratory rate and effort, lungs are clear to auscultation bilaterally.  Cardiac: Regular rate and rhythm, no murmurs, rubs or gallops.  GI: Abdomen is soft, nontender, nondistended, bowel sounds normal.  Extremities: No cyanosis, no edema, +2 pedal pulses.  Neurologic: Patient is alert and oriented x4, CN II-XII grossly normal, strength and sensation preserved.  Psych: Normal speech and affect, good eye contact.

## 2018-08-31 NOTE — PROGRESS NOTE ADULT - SUBJECTIVE AND OBJECTIVE BOX
cc: Patient is a 33y old  Female who was referred for elevated creatinine     HOSPITALIZATION DAY: 1    INTERVAL/OVERNIGHT EVENTS:    Patient examined at beside. No acute events over night.  Patient refers felling well . Patient is tolerating PO diet w/o nausea/vomiting/diarrhea/abdominal pain. Patient is on hensley catheter, draining clean urine. Patient denies chest pain, calf pain, abdominal pain, headaches, fever, chills, dysuria, hematuria, diarrhea, constipation, SOB, palpitations. Rest of ROS not contributory except for above.      Vitals  T(C): 36.7 (08-31-18 @ 15:35), Max: 37.1 (08-31-18 @ 07:36)  HR: 107 (08-31-18 @ 15:35) (68 - 107)  BP: 154/88 (08-31-18 @ 15:35) (119/74 - 176/111)  RR: 18 (08-31-18 @ 15:35) (18 - 21)  SpO2: 100% (08-31-18 @ 15:35) (100% - 100%)    Physical Exam:   GENERAL: NAD, well-groomed, well-developed. Cooperative.  HEAD:  Atraumatic, Normocephalic.  EYES: EOMI, PERRLA, conjunctiva and sclera clear.  ENMT: No tonsillar erythema, exudates, or enlargement; Moist mucous membranes.  NECK: Supple, No JVD, Normal thyroid  NERVOUS SYSTEM:  Alert & Oriented X3, Good concentration; Motor Strength 5/5 B/L upper and lower extremities;   RESP: Clear to auscultation bilaterally; No rales, rhonchi, wheezing, or rubs  CVS: Regular rate and rhythm; No murmurs, rubs, or gallops.  GI: Soft, Nontender, Nondistended; Bowel sounds present, no CVA tenderness.  EXTREMITIES:  2+ Peripheral Pulses, No clubbing, cyanosis, or edema  LYMPH: No cervical or supraclavicular lymphadenopathy noted  SKIN: Moderate skin and conjunctival pallor.  No rashes or lesions      LABS                          8.1    4.1   )-----------( 165      ( 30 Aug 2018 22:42 )             24.9         08-31    138  |  104  |  62.0<H>  ----------------------------<  83  5.2   |  18.0<L>  |  5.62<H>    Ca    8.5<L>      31 Aug 2018 06:22  Phos  4.5     08-31  Mg     2.3     08-31    TPro  7.5  /  Alb  4.1  /  TBili  0.2<L>  /  DBili  x   /  AST  9   /  ALT  12  /  AlkPhos  75  08-30        LIVER FUNCTIONS - ( 30 Aug 2018 22:42 )  Alb: 4.1 g/dL / Pro: 7.5 g/dL / ALK PHOS: 75 U/L / ALT: 12 U/L / AST: 9 U/L / GGT: x             PT/INR - ( 30 Aug 2018 22:42 )   PT: 11.6 sec;   INR: 1.05 ratio         PTT - ( 30 Aug 2018 22:42 )  PTT:31.6 sec        IMAGING  < from: US Kidney and Bladder (08.31.18 @ 04:44) >  FINDINGS:    Right kidney measures 8.2 cm in length. The left kidney measures 7.5 cm   in length. Bilateral renal cortices are echogenic. No hydronephrosis.   Study is technically limited.    Bladder is decompressed secondary to Hensley catheter.    IMPRESSION:  Medical renal disease. Limited study.    < end of copied text >    < from: MR Head No Cont (08.31.18 @ 11:21) >  FINDINGS:  There is no abnormal restricted diffusion to suggest acute infarction.   Normal signal is demonstrated throughout the brain parenchyma. Normal T2   flow-voids are seen within  the intracranial vasculature. The lateral   ventricles and cortical sulci are normal in size and configuration. There   is no mass, mass effect, or extra-axial fluid collection. There is no   susceptibility artifact to suggest hemorrhage. Midline structures are   normal. Moderate to severe polypoid inflammatory mucosal changes are seen   throughout the maxillary sinuses. The orbits and mastoid air cells are   unremarkable. Dynamic pituitary study is recommended with contrast given   the patient's clinical history.      IMPRESSION: Unremarkable MRI of the brain.  Dynamic pituitary study is   recommended with contrast given the patient's clinical history.    < end of copied text >    < from: IR CT Guided Needle Placement (08.31.18 @ 17:37) >  Impression:  1. Successful CT-guided right kidney biopsy. Postprocedure right   perinephric hematoma as above.    < end of copied text >        DIET:  -Diet with renal restrictions, DASH/TLC    MEDICATIONS  (STANDING):  amLODIPine   Tablet 10 milliGRAM(s) Oral daily  sodium bicarbonate  Infusion 0.183 mEq/kG/Hr (125 mL/Hr) IV Continuous <Continuous>    MEDICATIONS  (PRN):  acetaminophen   Tablet. 650 milliGRAM(s) Oral every 6 hours PRN Moderate Pain (4 - 6)  oxyCODONE    5 mG/acetaminophen 325 mG 2 Tablet(s) Oral every 6 hours PRN Severe Pain (7 - 10) cc: Patient is a 33y old  Female who was referred for elevated creatinine admitted with acute renal failure of unclear etiology     HOSPITALIZATION DAY: 1    INTERVAL/OVERNIGHT EVENTS:  Patient seen and examined at beside. No acute events over night.  Patient refers felling well. Patient is tolerating PO diet w/o  nausea/vomiting/diarrhea/ abdominal pain. Patient is on hensley catheter, draining clean urine. Patient denies chest pain, calf pain, abdominal pain, headaches, fever, chills, dysuria, hematuria, diarrhea, constipation, SOB, palpitations. Rest of ROS not contributory except for above.      Vitals  T(C): 36.7 (08-31-18 @ 15:35), Max: 37.1 (08-31-18 @ 07:36)  HR: 107 (08-31-18 @ 15:35) (68 - 107)  BP: 154/88 (08-31-18 @ 15:35) (119/74 - 176/111)  RR: 18 (08-31-18 @ 15:35) (18 - 21)  SpO2: 100% (08-31-18 @ 15:35) (100% - 100%)    Physical Exam:   GENERAL: NAD, well-groomed, well-developed. Cooperative.  NECK: Supple, No JVD, Normal thyroid  NERVOUS SYSTEM:  Alert & Oriented X3, Good concentration; Motor Strength 5/5 B/L upper and lower extremities;   RESP: Clear to auscultation bilaterally; No rales, rhonchi, wheezing, or rubs  CVS: Regular rate and rhythm; No murmurs, rubs, or gallops.  GI: Soft, Nontender, Nondistended; Bowel sounds present, no CVA tenderness  : Hensley catheter in place with clear urine   EXTREMITIES:  2+ Peripheral Pulses, No clubbing, cyanosis, or edema  LYMPH: No cervical or supraclavicular lymphadenopathy noted  SKIN: Moderate skin and conjunctival pallor.  No rashes or lesions      LABS                          8.1    4.1   )-----------( 165      ( 30 Aug 2018 22:42 )             24.9         08-31    138  |  104  |  62.0<H>  ----------------------------<  83  5.2   |  18.0<L>  |  5.62<H>    Ca    8.5<L>      31 Aug 2018 06:22  Phos  4.5     08-31  Mg     2.3     08-31    TPro  7.5  /  Alb  4.1  /  TBili  0.2<L>  /  DBili  x   /  AST  9   /  ALT  12  /  AlkPhos  75  08-30        LIVER FUNCTIONS - ( 30 Aug 2018 22:42 )  Alb: 4.1 g/dL / Pro: 7.5 g/dL / ALK PHOS: 75 U/L / ALT: 12 U/L / AST: 9 U/L / GGT: x             PT/INR - ( 30 Aug 2018 22:42 )   PT: 11.6 sec;   INR: 1.05 ratio         PTT - ( 30 Aug 2018 22:42 )  PTT:31.6 sec        IMAGING  < from: US Kidney and Bladder (08.31.18 @ 04:44) >  FINDINGS:    Right kidney measures 8.2 cm in length. The left kidney measures 7.5 cm   in length. Bilateral renal cortices are echogenic. No hydronephrosis.   Study is technically limited.    Bladder is decompressed secondary to Hensley catheter.    IMPRESSION:  Medical renal disease. Limited study.    < end of copied text >    < from: MR Head No Cont (08.31.18 @ 11:21) >  FINDINGS:  There is no abnormal restricted diffusion to suggest acute infarction.   Normal signal is demonstrated throughout the brain parenchyma. Normal T2   flow-voids are seen within  the intracranial vasculature. The lateral   ventricles and cortical sulci are normal in size and configuration. There   is no mass, mass effect, or extra-axial fluid collection. There is no   susceptibility artifact to suggest hemorrhage. Midline structures are   normal. Moderate to severe polypoid inflammatory mucosal changes are seen   throughout the maxillary sinuses. The orbits and mastoid air cells are   unremarkable. Dynamic pituitary study is recommended with contrast given   the patient's clinical history.      IMPRESSION: Unremarkable MRI of the brain.  Dynamic pituitary study is   recommended with contrast given the patient's clinical history.    < end of copied text >    < from: IR CT Guided Needle Placement (08.31.18 @ 17:37) >  Impression:  1. Successful CT-guided right kidney biopsy. Postprocedure right   perinephric hematoma as above.    < end of copied text >        DIET:  -Diet with renal restrictions, DASH/TLC    MEDICATIONS  (STANDING):  amLODIPine   Tablet 10 milliGRAM(s) Oral daily  sodium bicarbonate  Infusion 0.183 mEq/kG/Hr (125 mL/Hr) IV Continuous <Continuous>    MEDICATIONS  (PRN):  acetaminophen   Tablet. 650 milliGRAM(s) Oral every 6 hours PRN Moderate Pain (4 - 6)  oxyCODONE    5 mG/acetaminophen 325 mG 2 Tablet(s) Oral every 6 hours PRN Severe Pain (7 - 10)

## 2018-08-31 NOTE — H&P ADULT - PROBLEM SELECTOR PLAN 1
- Acute renal failure vs. newly-identified chronic renal failure.  - BUN:Cr ratio 10.7, likely primary renal in origin.   - Long standing undiagnosed/untreated hypertension a possibility, given patient's age and gender, need to rule out fibromuscular dysplasia.  - Patient is not fluid overload. Gentle hydration.  - Hyperkalemia management as below.  - Anemia management as below.  - US doppler of the kidneys  - U/A with microscopy.  - Further workup decisions pending results of UA and US.  - Nephrology consultation  - Hold nephrotoxic medications  - Renal diet. - BUN:Cr ratio 10.7, likely primary renal in origin.  - Wide differential diagnosis.  - Given patient's report of Reynaud's phenomenon, vasculitis is of concern.  - Long standing previously undiagnosed/untreated hypertension a possibility. Given patient's age and gender, need to rule out fibromuscular dysplasia.  - Unlikely to be solely dependent on newly prescribed ACEIs.  - Patient is not fluid overload. Gentle hydration.  - Hyperkalemia management as below.  - Anemia management as below.  - US doppler of the kidneys  - U/A with microscopy.  - Further workup decisions pending results of UA and US.  - Nephrology consultation  - Hold nephrotoxic medications  - Renal diet.  - Likely the reason for increased levels of serum BNP. - BUN:Cr ratio 10.7, likely primary renal in origin.  - Wide differential diagnosis.  - Given patient's report of Reynaud's phenomenon, vasculitis is of concern. F/u ANCA, ABLA, antidsDNA, CRP and ESR.  - Long standing previously undiagnosed/untreated hypertension a possibility. Given patient's age and gender, need to rule out fibromuscular dysplasia.  - Unlikely to be solely dependent on newly prescribed ACEIs.  - Patient is not fluid overload. Gentle hydration.  - Hyperkalemia management as below.  - Anemia management as below.  - US doppler of the kidneys  - U/A with microscopy.  - Nephrology consultation  - Hold nephrotoxic medications  - Renal diet.  - Likely the reason for increased levels of serum BNP. - BUN:Cr ratio 10.7, likely primary renal in origin.  - Wide differential diagnosis.  - Given patient's report of Reynaud's phenomenon, vasculitis is of concern. F/u ANCA, ALBA, antidsDNA, CRP and ESR.  - Long standing previously undiagnosed/untreated hypertension a possibility. Given patient's age and gender, need to rule out fibromuscular dysplasia.  - Unlikely to be solely dependent on newly prescribed ACEIs.  - Patient is not fluid overload. Gentle hydration.  - Hyperkalemia management as below.  - Anemia management as below.  - US doppler of the kidneys  - U/A with microscopy.  - Nephrology consultation  - Hold nephrotoxic medications  - Renal diet.  - Likely the reason for increased levels of serum BNP however, will trend.

## 2018-08-31 NOTE — H&P ADULT - ASSESSMENT
Patient is a 33 years old female with PMHx of recently diagnosed hypertension and migraines admitted with newly-identified renal failure.

## 2018-08-31 NOTE — H&P ADULT - NSHPREVIEWOFSYSTEMS_GEN_ALL_CORE
Patient denies any chest pain, palpitations, shortness of breath, abdominal pain, nausea, vomiting, diarrhea, back pain or dysuria. She reports normal production of urine. Patient denies any chest pain, palpitations, shortness of breath, abdominal pain, nausea, vomiting, diarrhea, back pain or dysuria. She reports normal production of urine.  She stated that occasionally she has noticed her hands turning cold and pale.  Patient mentioned that she used to have regular menses every 28 days lasting up to 7 days until the last quarter of 2017 when she noted that her periods started coming every 1 1/2 months and her LMP was on 04/2018

## 2018-08-31 NOTE — PROGRESS NOTE ADULT - PROBLEM SELECTOR PLAN 3
- S/p Insulin and 25 mg of Dextrose given in the ED.  - EKG without peaked T waves, shortening of QTc or conduction abnormalities.  - K+ level <7mEq/L  - Repeat K+, hold on Ca+2 gluconate  - MRI head with diffusion, noncontrast.

## 2018-08-31 NOTE — H&P ADULT - PMH
Hypertension, unspecified type    Migraine without aura, not intractable, without status migrainosus

## 2018-08-31 NOTE — PROGRESS NOTE ADULT - PROBLEM SELECTOR PLAN 5
- Likely secondary to hyperprolactinemia.  - Will trend LH, FSH and prolactin.  - Will need brain imaging as part of investigation for adenoma.

## 2018-08-31 NOTE — CONSULT NOTE ADULT - ASSESSMENT
1) ATN  2) HTN  3) Migraines  4) Acidosis    Patient likely has ATN in the setting of lisinopril use and ibuprofen use  However given her rise in creatinine with no significant improvement overnight would opt for renal biopsy  Continue with hensley catheter;   Continue with IVF; on bicarb drip  Check VBG  Discussed biopsy with IR; they will do it today ;   UA; urine pro/cr ratio;  Check serologic workup-- ALBA, RPR, GBM, ANCA, C3, C4, dsDNA, free light chains, immunofixation, PLA2R, hep B/C, LDH, hapto  dw Dr Szymanski 1) ATN  2) HTN - CKD related  3) Migraines  4) Acidosis    Patient likely has ATN in the setting of lisinopril use and ibuprofen use  However given her rise in creatinine with no significant improvement overnight would opt for renal biopsy  Continue with hensley catheter;   Continue with IVF; on bicarb drip  Check VBG  Discussed biopsy with IR; they will do it today ;   UA; urine pro/cr ratio;  Check serologic workup-- ALBA, RPR, GBM, ANCA, C3, C4, dsDNA, free light chains, immunofixation, PLA2R, hep B/C, LDH, hapto  Check renal sonogram with arterial dopplers to r/o KEIRA    tyrell Szymanski

## 2018-08-31 NOTE — PROGRESS NOTE ADULT - PROBLEM SELECTOR PLAN 2
- Hold home Lisinopril at the moment.  - s/p x1 dose of Hydralazine 10mg IVP at the ED  - Amlodipine 10 mg PO and titrate as needed.  - Patient's history of new onset self resolving episodic headaches, raises the possibility of pheochromocytoma, will follow up results of urine metanephrines.

## 2018-08-31 NOTE — H&P ADULT - PROBLEM SELECTOR PLAN 3
- S/p Insulin and 25 mg of Dextrose given in the ED.  - EKG without peaked T waves, shortening of QTc or conduction abnormalities.  - K+ level <7mEq/L  - Repeat K+ - S/p Insulin and 25 mg of Dextrose given in the ED.  - EKG without peaked T waves, shortening of QTc or conduction abnormalities.  - K+ level <7mEq/L  - Repeat K+, hold on Ca+2 gluconate - S/p Insulin and 25 mg of Dextrose given in the ED.  - EKG without peaked T waves, shortening of QTc or conduction abnormalities.  - K+ level <7mEq/L  - Repeat K+, hold on Ca+2 gluconate  - MRI head with diffusion, noncontrast.

## 2018-08-31 NOTE — PROGRESS NOTE ADULT - ASSESSMENT
Patient is a 33 years old female with PMHx of recently diagnosed hypertension and migraines treated with Propanolol for two weeks, is referred from [facility] due to elevated creatinine, concerning of acute on chronic kidney failure.    Workup id done to r/o vasculitis in the context of a your patient with hypertension and kidney injury w/o evident risk factors or cause. The patient has shown slow improvement of renal function and renal bx was performed for further analysis. Patient presented mild alice incisional haemorrhagia after the procedure, will follow closely.  Nephrology is following the case. Slow clinical improvement      1. Acute on chronic kidney disease  - BUN:Cr ratio 10.7, likely primary renal in origin.  - Vasculitis concern ESR IS ELEVATED -->F/u ANCA, ALBA, antidsDNA, CRP  - c/w Gentle hydration.  - Hyperkalemia management as below.  - Anemia management as below.  -S/P renal Bx -->F/U results  - F/U Renal doppler US in am  - U/A with microscopy.  - Hold nephrotoxic medications.  -c/w DASH/TLC diet with Renal restrictions.  -- Nephrology consultation noted and appreciated      2.  Hypertension  - Improving  -Holding Lisinopril at the moment.  - s/p x1 dose of Hydralazine 10mg IVP at the ED  - Amlodipine 10 mg PO and titrate as needed.  - -f/u results of urine metanephrines.     3.Hyperkalemia   - S/p Insulin and 25 mg of Dextrose given in the ED.  - EKG T wave changes ot QTc abnormalities  - K+ level <7mEq/L  - Repeat K+, hold on Ca+2 gluconate    4. Prolactinemia  -Prolactin >200  -MRI head shows no lesions.  -Will follow up with Endocrinology     5. Anemia  - Normocytic normochromic  - Likely secondary to CKD  - f/u Iron studies.  - Type and Screen.     6. Amenorrhea  - Likely secondary to hyperprolactinemia.  - Will trend LH, FSH and prolactin.  - Will need brain imaging as part of investigation for adenoma.     7. DVT prophylaxis        Dispo: Patient is a 33 years old female with PMHx of recently diagnosed hypertension and migraines treated with Propanolol for two weeks, is referred from [facility] due to elevated creatinine, concerning of acute on chronic kidney failure.    Workup id done to r/o vasculitis in the context of a your patient with hypertension and kidney injury w/o evident risk factors or cause. The patient has shown slow improvement of renal function and renal bx was performed for further analysis. Patient presented mild alice incisional haemorrhagia after the procedure, will follow closely.  Nephrology is following the case. Slow clinical improvement      1. Acute on chronic kidney disease  - BUN:Cr ratio 10.7, likely primary renal in origin.  - Vasculitis concern ESR IS ELEVATED -->F/u ANCA, ALBA, antidsDNA, CRP  - c/w Gentle hydration.  - Hyperkalemia management as below.  - Anemia management as below.  -S/P renal Bx -->F/U results  - F/U Renal doppler US in am  - U/A with microscopy.  - Hold nephrotoxic medications.  -c/w DASH/TLC diet with Renal restrictions.  -- Nephrology consultation noted and appreciated      2.  Hypertension  - Improving  -Holding Lisinopril at the moment.  - s/p x1 dose of Hydralazine 10mg IVP at the ED  - Amlodipine 10 mg PO and titrate as needed.  - -f/u results of urine metanephrines.     3.Hyperkalemia   - S/p Insulin and 25 mg of Dextrose given in the ED.  - EKG T wave changes ot QTc abnormalities  - K+ level <7mEq/L  - Repeat K+, hold on Ca+2 gluconate    4. Prolactinemia  -Prolactin >200  -MRI head shows no lesions.  -Will follow up with Endocrinology.     5. Anemia of chronic disease   - Low TIBC, with low-normal Iron, ferritin and trasferrin  -Normocytic normochromic  - Likely secondary to CKD  - f/u Iron studies.       6. Amenorrhea  - Likely secondary to hyperprolactinemia.  - Will trend LH, FSH and prolactin.  - Will need brain imaging as part of investigation for adenoma.     7. DVT prophylaxis  - Padua prediction Score: 0. No need for DVT prophylaxis.      Dispo:

## 2018-08-31 NOTE — PROGRESS NOTE ADULT - PROBLEM SELECTOR PLAN 1
- BUN:Cr ratio 10.7, likely primary renal in origin.  - Wide differential diagnosis.  - Given patient's report of Reynaud's phenomenon, vasculitis is of concern. F/u ANCA, ALBA, antidsDNA, CRP and ESR.  - Long standing previously undiagnosed/untreated hypertension a possibility. Given patient's age and gender, need to rule out fibromuscular dysplasia.  - Unlikely to be solely dependent on newly prescribed ACEIs.  - Patient is not fluid overload. Gentle hydration.  - Hyperkalemia management as below.  - Anemia management as below.  - US doppler of the kidneys  - U/A with microscopy.  - Nephrology consultation  - Hold nephrotoxic medications  - Renal diet.  - Likely the reason for increased levels of serum BNP however, will trend.

## 2018-08-31 NOTE — H&P ADULT - PROBLEM SELECTOR PROBLEM 1
Renal failure, unspecified chronicity Acute renal failure superimposed on chronic kidney disease, unspecified CKD stage, unspecified acute renal failure type

## 2018-08-31 NOTE — BRIEF OPERATIVE NOTE - PROCEDURE
<<-----Click on this checkbox to enter Procedure Kidney biopsy, percutaneous needle  08/31/2018    Active  [id number]

## 2018-08-31 NOTE — H&P ADULT - HISTORY OF PRESENT ILLNESS
Patient is a 33 years old female with PMHx of recently diagnosed hypertension who was sent from [facility] due to elevated BUN and Cr.  Patient was seen for a complete physical examination on Patient is a 33 years old female with PMHx of recently diagnosed hypertension who was sent from [facility] due to elevated BUN and Cr.  Patient was previously diagnosed with HTN on 07/2018 during a sick visit with a BP reading of 187/109 mmHg and was placed on propanolol given that patient also complained at that time of headache with clinical-characteristics of migraine, she took it for about 2 weeks and self discontinued due to dry mouth. She was seen again for a complete physical examination on 08/23/2018; That day, blood work was ordered and she was placed on Lisinopril 10mg PO daily which she has been taking since then. She was contacted over the phone and was made aware of the results and subsequently came to the ED for evaluation.   She is s/p x1 dose of 25mg of dextrose IV + 2U of insulin as per ED due to hyperkalemia of 5.1mEq/L.  Upon review of her [facility] chart, it was also noted that she was prescribed Ibuprofen in two occasions for her migraine headaches however, patient states that she didn't take any medications for pain and that when it was severe she would take acetaminophen. She denies intake of other OTC medications or herbal supplements or vitamins. Patient is a 33 years old female with PMHx of recently diagnosed hypertension who was sent from [facility] due to elevated BUN and Cr.  Patient was previously diagnosed with HTN on 07/2018 during a sick visit with a BP reading of 187/109 mmHg and was placed on propanolol given that patient also complained at that time of headache with clinical-characteristics of migraine, she took it for about 2 weeks and self discontinued due to dry mouth. She was seen again for a complete physical examination on 08/23/2018; That day, blood work was ordered and she was placed on Lisinopril 10mg PO daily which she has been taking since then. She was contacted over the phone and was made aware of the results and subsequently came to the ED for evaluation.   She is s/p x1 dose of 25mg of dextrose IV + 2U of insulin as per ED due to hyperkalemia of 5.1mEq/L.  Upon review of her [facility] chart, it was also noted that she was prescribed Ibuprofen in two occasions for her migraine headaches however, patient states that she didn't take any medications for pain and that when it was severe she would take acetaminophen. She denies intake of other OTC medications or herbal supplements or vitamins.  Of note, patient Patient is a 33 years old female with PMHx of recently diagnosed hypertension who was sent from [facility] due to elevated BUN and Cr.  Patient was previously diagnosed with HTN on 07/2018 during a sick visit with a BP reading of 187/109 mmHg and was placed on propanolol given that patient also complained at that time of headache with clinical-characteristics of migraine, she took it for about 2 weeks and self discontinued due to dry mouth. She was seen again for a complete physical examination on 08/23/2018; That day, blood work was ordered and she was placed on Lisinopril 10mg PO daily which she has been taking since then. She was contacted over the phone and was made aware of the results and subsequently came to the ED for evaluation.   She is s/p x1 dose of 25mg of dextrose IV + 2U of insulin as per ED due to hyperkalemia of 5.1mEq/L.  Upon review of her [facility] chart, it was also noted that she was prescribed Ibuprofen in two occasions for her migraine headaches however, patient states that she didn't take any medications for pain and that when it was severe she would take acetaminophen. She denies intake of other OTC medications or herbal supplements or vitamins.

## 2018-08-31 NOTE — H&P ADULT - PROBLEM SELECTOR PLAN 5
- Padua prediction Score: 0. No need for DVT prophylaxis. - Likely secondary to hyperprolactinemia.  - Will trend LH, FSH and prolactin.  - Will need brain imaging as part of investigation for adenoma.

## 2018-08-31 NOTE — ED ADULT NURSE REASSESSMENT NOTE - NS ED NURSE REASSESS COMMENT FT1
Patient admitted, resting comfortably. No/s/s of distress. BP improving. On CM , NSR. Will continue to monitor. Asymptomatic.

## 2018-08-31 NOTE — H&P ADULT - NSHPLABSRESULTS_GEN_ALL_CORE
8.1    4.1   )-----------( 165      ( 30 Aug 2018 22:42 )             24.9     08-30    135  |  102  |  65.0<H>  ----------------------------<  95  5.4<H>   |  18.0<L>  |  6.05<H>    Ca    8.8      30 Aug 2018 22:42  TPro  7.5  /  Alb  4.1  /  TBili  0.2<L>  /  DBili  x   /  AST  9   /  ALT  12  /  AlkPhos  75  08-30    PT/INR - ( 30 Aug 2018 22:42 )   PT: 11.6 sec;   INR: 1.05 ratio    PTT - ( 30 Aug 2018 22:42 )  PTT:31.6 sec    Troponin T, Serum: <0.01 ng/mL (08.30.18 @ 22:42)  Serum Pro-Brain Natriuretic Peptide: 1712 pg/mL (08.30.18 @ 22:42) 8.1    4.1   )-----------( 165      ( 30 Aug 2018 22:42 )             24.9     08-30    135  |  102  |  65.0<H>  ----------------------------<  95  5.4<H>   |  18.0<L>  |  6.05<H>    Ca    8.8      30 Aug 2018 22:42  TPro  7.5  /  Alb  4.1  /  TBili  0.2<L>  /  DBili  x   /  AST  9   /  ALT  12  /  AlkPhos  75  08-30    PT/INR - ( 30 Aug 2018 22:42 )   PT: 11.6 sec;   INR: 1.05 ratio    PTT - ( 30 Aug 2018 22:42 )  PTT:31.6 sec    Troponin T, Serum: <0.01 ng/mL (08.30.18 @ 22:42)  Serum Pro-Brain Natriuretic Peptide: 1712 pg/mL (08.30.18 @ 22:42)    Upon review of patient's [facility] chart, hormonal levels noted as follow:  LH: 2.9mIU/mL  FSH: 4.7mIU/mL  Prolactin: 239.6 ng/mL (4.8-23.3 ng/mL in nonpregnant woman)  TSH: 2.030 IU/mL (0.178-4.530 IU/mL)

## 2018-08-31 NOTE — BRIEF OPERATIVE NOTE - OPERATION/FINDINGS
ct guidance.  17 g coaxial needle right kidney lower pole. 18 g core bx with biopince needle. 5 passes but only 2 adequate specimens due to perirenal hematoma during procedure.  Hypertensive: given 10 mg hydralazine with improvement. Then patient became hypotensive from vagal reaction, but improved with IV fluid.    Was given intraprocedural Sedation: 50 ug Fentanyl and 1 mg Versed

## 2018-08-31 NOTE — PROGRESS NOTE ADULT - SUBJECTIVE AND OBJECTIVE BOX
cc: Patient is a 33y old  Female who was referred for elevated creatinine     HOSPITALIZATION DAY: 1    INTERVAL/OVERNIGHT EVENTS:    Patient examined at beside. No acute events over night.  Patient refers felling well. better/ worse/ same _________________. Patient is tolerating _______ diet w/o nausea/vomiting/diarrhea/abdominal pain. Patient is voiding actively and las BM  on __________. Patient is deambulating / OOB to chair _______________. Patient denies chest pain, calf pain, abdominal pain, headaches, fever, chills, dysuria, hematuria, diarrhea, constipation, SOB, palpitations. Rest of ROS not contributory except for above.      Vitals  T(C): 36.7 (08-31-18 @ 15:35), Max: 37.1 (08-31-18 @ 07:36)  HR: 107 (08-31-18 @ 15:35) (68 - 107)  BP: 154/88 (08-31-18 @ 15:35) (119/74 - 176/111)  RR: 18 (08-31-18 @ 15:35) (18 - 21)  SpO2: 100% (08-31-18 @ 15:35) (100% - 100%)    Physical Exam:   GENERAL: NAD, well-groomed, well-developed. Cooperative.  HEAD:  Atraumatic, Normocephalic.  EYES: EOMI, PERRLA, conjunctiva and sclera clear.  ENMT: No tonsillar erythema, exudates, or enlargement; Moist mucous membranes.  NECK: Supple, No JVD, Normal thyroid  NERVOUS SYSTEM:  Alert & Oriented X3, Good concentration; Motor Strength 5/5 B/L upper and lower extremities;   RESP: Clear to auscultation bilaterally; No rales, rhonchi, wheezing, or rubs  CVS: Regular rate and rhythm; No murmurs, rubs, or gallops.  GI: Soft, Nontender, Nondistended; Bowel sounds present, no CVA tenderness.  EXTREMITIES:  2+ Peripheral Pulses, No clubbing, cyanosis, or edema  LYMPH: No cervical or supraclavicular lymphadenopathy noted  SKIN: Moderate skin and conjunctival pallor.  No rashes or lesions      LABS                          8.1    4.1   )-----------( 165      ( 30 Aug 2018 22:42 )             24.9         08-31    138  |  104  |  62.0<H>  ----------------------------<  83  5.2   |  18.0<L>  |  5.62<H>    Ca    8.5<L>      31 Aug 2018 06:22  Phos  4.5     08-31  Mg     2.3     08-31    TPro  7.5  /  Alb  4.1  /  TBili  0.2<L>  /  DBili  x   /  AST  9   /  ALT  12  /  AlkPhos  75  08-30        LIVER FUNCTIONS - ( 30 Aug 2018 22:42 )  Alb: 4.1 g/dL / Pro: 7.5 g/dL / ALK PHOS: 75 U/L / ALT: 12 U/L / AST: 9 U/L / GGT: x             PT/INR - ( 30 Aug 2018 22:42 )   PT: 11.6 sec;   INR: 1.05 ratio         PTT - ( 30 Aug 2018 22:42 )  PTT:31.6 sec        IMAGING      DIET:  -NPO     MEDICATIONS  (STANDING):  amLODIPine   Tablet 10 milliGRAM(s) Oral daily  sodium bicarbonate  Infusion 0.183 mEq/kG/Hr (125 mL/Hr) IV Continuous <Continuous>    MEDICATIONS  (PRN):  acetaminophen   Tablet. 650 milliGRAM(s) Oral every 6 hours PRN Moderate Pain (4 - 6)  oxyCODONE    5 mG/acetaminophen 325 mG 2 Tablet(s) Oral every 6 hours PRN Severe Pain (7 - 10) cc: Patient is a 33y old  Female who was referred for elevated creatinine     HOSPITALIZATION DAY: 1    INTERVAL/OVERNIGHT EVENTS:    Patient examined at beside. No acute events over night.  Patient refers felling well . Patient is tolerating PO diet w/o nausea/vomiting/diarrhea/abdominal pain. Patient is on hensley catheter, draining clean urine. Patient denies chest pain, calf pain, abdominal pain, headaches, fever, chills, dysuria, hematuria, diarrhea, constipation, SOB, palpitations. Rest of ROS not contributory except for above.      Vitals  T(C): 36.7 (08-31-18 @ 15:35), Max: 37.1 (08-31-18 @ 07:36)  HR: 107 (08-31-18 @ 15:35) (68 - 107)  BP: 154/88 (08-31-18 @ 15:35) (119/74 - 176/111)  RR: 18 (08-31-18 @ 15:35) (18 - 21)  SpO2: 100% (08-31-18 @ 15:35) (100% - 100%)    Physical Exam:   GENERAL: NAD, well-groomed, well-developed. Cooperative.  HEAD:  Atraumatic, Normocephalic.  EYES: EOMI, PERRLA, conjunctiva and sclera clear.  ENMT: No tonsillar erythema, exudates, or enlargement; Moist mucous membranes.  NECK: Supple, No JVD, Normal thyroid  NERVOUS SYSTEM:  Alert & Oriented X3, Good concentration; Motor Strength 5/5 B/L upper and lower extremities;   RESP: Clear to auscultation bilaterally; No rales, rhonchi, wheezing, or rubs  CVS: Regular rate and rhythm; No murmurs, rubs, or gallops.  GI: Soft, Nontender, Nondistended; Bowel sounds present, no CVA tenderness.  EXTREMITIES:  2+ Peripheral Pulses, No clubbing, cyanosis, or edema  LYMPH: No cervical or supraclavicular lymphadenopathy noted  SKIN: Moderate skin and conjunctival pallor.  No rashes or lesions      LABS                          8.1    4.1   )-----------( 165      ( 30 Aug 2018 22:42 )             24.9         08-31    138  |  104  |  62.0<H>  ----------------------------<  83  5.2   |  18.0<L>  |  5.62<H>    Ca    8.5<L>      31 Aug 2018 06:22  Phos  4.5     08-31  Mg     2.3     08-31    TPro  7.5  /  Alb  4.1  /  TBili  0.2<L>  /  DBili  x   /  AST  9   /  ALT  12  /  AlkPhos  75  08-30        LIVER FUNCTIONS - ( 30 Aug 2018 22:42 )  Alb: 4.1 g/dL / Pro: 7.5 g/dL / ALK PHOS: 75 U/L / ALT: 12 U/L / AST: 9 U/L / GGT: x             PT/INR - ( 30 Aug 2018 22:42 )   PT: 11.6 sec;   INR: 1.05 ratio         PTT - ( 30 Aug 2018 22:42 )  PTT:31.6 sec        IMAGING      DIET:  -NPO     MEDICATIONS  (STANDING):  amLODIPine   Tablet 10 milliGRAM(s) Oral daily  sodium bicarbonate  Infusion 0.183 mEq/kG/Hr (125 mL/Hr) IV Continuous <Continuous>    MEDICATIONS  (PRN):  acetaminophen   Tablet. 650 milliGRAM(s) Oral every 6 hours PRN Moderate Pain (4 - 6)  oxyCODONE    5 mG/acetaminophen 325 mG 2 Tablet(s) Oral every 6 hours PRN Severe Pain (7 - 10)

## 2018-09-01 LAB
ABO RH CONFIRMATION: SIGNIFICANT CHANGE UP
ALBUMIN SERPL ELPH-MCNC: 3.3 G/DL — SIGNIFICANT CHANGE UP (ref 3.3–5.2)
ALP SERPL-CCNC: 60 U/L — SIGNIFICANT CHANGE UP (ref 40–120)
ALT FLD-CCNC: 9 U/L — SIGNIFICANT CHANGE UP
ANION GAP SERPL CALC-SCNC: 13 MMOL/L — SIGNIFICANT CHANGE UP (ref 5–17)
ANISOCYTOSIS BLD QL: SLIGHT — SIGNIFICANT CHANGE UP
AST SERPL-CCNC: 8 U/L — SIGNIFICANT CHANGE UP
BASOPHILS # BLD AUTO: 0 K/UL — SIGNIFICANT CHANGE UP (ref 0–0.2)
BASOPHILS NFR BLD AUTO: 0.5 % — SIGNIFICANT CHANGE UP (ref 0–2)
BILIRUB SERPL-MCNC: 0.3 MG/DL — LOW (ref 0.4–2)
BLD GP AB SCN SERPL QL: SIGNIFICANT CHANGE UP
BUN SERPL-MCNC: 60 MG/DL — HIGH (ref 8–20)
C3 SERPL-MCNC: 73 MG/DL — LOW (ref 81–157)
C3 SERPL-MCNC: 76 MG/DL — LOW (ref 81–157)
C4 SERPL-MCNC: 17 MG/DL — SIGNIFICANT CHANGE UP (ref 13–39)
CALCIUM SERPL-MCNC: 8.8 MG/DL — SIGNIFICANT CHANGE UP (ref 8.6–10.2)
CHLORIDE SERPL-SCNC: 105 MMOL/L — SIGNIFICANT CHANGE UP (ref 98–107)
CO2 SERPL-SCNC: 24 MMOL/L — SIGNIFICANT CHANGE UP (ref 22–29)
CREAT SERPL-MCNC: 6.31 MG/DL — HIGH (ref 0.5–1.3)
EOSINOPHIL # BLD AUTO: 0 K/UL — SIGNIFICANT CHANGE UP (ref 0–0.5)
EOSINOPHIL NFR BLD AUTO: 0 % — SIGNIFICANT CHANGE UP (ref 0–6)
GLUCOSE SERPL-MCNC: 99 MG/DL — SIGNIFICANT CHANGE UP (ref 70–115)
HAPTOGLOB SERPL-MCNC: 124 MG/DL — SIGNIFICANT CHANGE UP (ref 34–200)
HAV IGM SER-ACNC: SIGNIFICANT CHANGE UP
HBV CORE IGM SER-ACNC: SIGNIFICANT CHANGE UP
HBV SURFACE AB SER-ACNC: SIGNIFICANT CHANGE UP
HCT VFR BLD CALC: 20.4 % — CRITICAL LOW (ref 37–47)
HCT VFR BLD CALC: 20.7 % — CRITICAL LOW (ref 37–47)
HCT VFR BLD CALC: 21.3 % — LOW (ref 37–47)
HGB BLD-MCNC: 6.7 G/DL — CRITICAL LOW (ref 12–16)
HGB BLD-MCNC: 6.7 G/DL — CRITICAL LOW (ref 12–16)
HGB BLD-MCNC: 7 G/DL — CRITICAL LOW (ref 12–16)
HYPOCHROMIA BLD QL: SLIGHT — SIGNIFICANT CHANGE UP
KAPPA LC SER QL IFE: 13.37 MG/DL — HIGH (ref 0.33–1.94)
KAPPA/LAMBDA FREE LIGHT CHAIN RATIO, SERUM: 0.85 RATIO — SIGNIFICANT CHANGE UP (ref 0.26–1.65)
LAMBDA LC SER QL IFE: 15.7 MG/DL — HIGH (ref 0.57–2.63)
LYMPHOCYTES # BLD AUTO: 0.8 K/UL — LOW (ref 1–4.8)
LYMPHOCYTES # BLD AUTO: 22.5 % — SIGNIFICANT CHANGE UP (ref 20–55)
MACROCYTES BLD QL: SLIGHT — SIGNIFICANT CHANGE UP
MCHC RBC-ENTMCNC: 27.2 PG — SIGNIFICANT CHANGE UP (ref 27–31)
MCHC RBC-ENTMCNC: 27.6 PG — SIGNIFICANT CHANGE UP (ref 27–31)
MCHC RBC-ENTMCNC: 27.6 PG — SIGNIFICANT CHANGE UP (ref 27–31)
MCHC RBC-ENTMCNC: 32.4 G/DL — SIGNIFICANT CHANGE UP (ref 32–36)
MCHC RBC-ENTMCNC: 32.8 G/DL — SIGNIFICANT CHANGE UP (ref 32–36)
MCHC RBC-ENTMCNC: 32.9 G/DL — SIGNIFICANT CHANGE UP (ref 32–36)
MCV RBC AUTO: 83.9 FL — SIGNIFICANT CHANGE UP (ref 81–99)
MCV RBC AUTO: 84 FL — SIGNIFICANT CHANGE UP (ref 81–99)
MCV RBC AUTO: 84.1 FL — SIGNIFICANT CHANGE UP (ref 81–99)
MICROCYTES BLD QL: SLIGHT — SIGNIFICANT CHANGE UP
MONOCYTES # BLD AUTO: 0.6 K/UL — SIGNIFICANT CHANGE UP (ref 0–0.8)
MONOCYTES NFR BLD AUTO: 15.8 % — HIGH (ref 3–10)
NEUTROPHILS # BLD AUTO: 2.3 K/UL — SIGNIFICANT CHANGE UP (ref 1.8–8)
NEUTROPHILS NFR BLD AUTO: 61.7 % — SIGNIFICANT CHANGE UP (ref 37–73)
OVALOCYTES BLD QL SMEAR: SLIGHT — SIGNIFICANT CHANGE UP
PLAT MORPH BLD: NORMAL — SIGNIFICANT CHANGE UP
PLATELET # BLD AUTO: 131 K/UL — LOW (ref 150–400)
PLATELET # BLD AUTO: 141 K/UL — LOW (ref 150–400)
PLATELET # BLD AUTO: 147 K/UL — LOW (ref 150–400)
POIKILOCYTOSIS BLD QL AUTO: SLIGHT — SIGNIFICANT CHANGE UP
POTASSIUM SERPL-MCNC: 4.9 MMOL/L — SIGNIFICANT CHANGE UP (ref 3.5–5.3)
POTASSIUM SERPL-SCNC: 4.9 MMOL/L — SIGNIFICANT CHANGE UP (ref 3.5–5.3)
PROT SERPL-MCNC: 6.1 G/DL — LOW (ref 6.6–8.7)
PTH-INTACT FLD-MCNC: 181 PG/ML — HIGH (ref 15–65)
RBC # BLD: 2.43 M/UL — LOW (ref 4.4–5.2)
RBC # BLD: 2.46 M/UL — LOW (ref 4.4–5.2)
RBC # BLD: 2.54 M/UL — LOW (ref 4.4–5.2)
RBC # FLD: 13.7 % — SIGNIFICANT CHANGE UP (ref 11–15.6)
RBC # FLD: 14.1 % — SIGNIFICANT CHANGE UP (ref 11–15.6)
RBC # FLD: 14.2 % — SIGNIFICANT CHANGE UP (ref 11–15.6)
RBC BLD AUTO: ABNORMAL
SODIUM SERPL-SCNC: 142 MMOL/L — SIGNIFICANT CHANGE UP (ref 135–145)
TYPE + AB SCN PNL BLD: SIGNIFICANT CHANGE UP
WBC # BLD: 3.7 K/UL — LOW (ref 4.8–10.8)
WBC # BLD: 3.8 K/UL — LOW (ref 4.8–10.8)
WBC # BLD: 3.9 K/UL — LOW (ref 4.8–10.8)
WBC # FLD AUTO: 3.7 K/UL — LOW (ref 4.8–10.8)
WBC # FLD AUTO: 3.8 K/UL — LOW (ref 4.8–10.8)
WBC # FLD AUTO: 3.9 K/UL — LOW (ref 4.8–10.8)

## 2018-09-01 PROCEDURE — 99233 SBSQ HOSP IP/OBS HIGH 50: CPT

## 2018-09-01 PROCEDURE — 74176 CT ABD & PELVIS W/O CONTRAST: CPT | Mod: 26

## 2018-09-01 PROCEDURE — 93975 VASCULAR STUDY: CPT | Mod: 26

## 2018-09-01 RX ORDER — ONDANSETRON 8 MG/1
4 TABLET, FILM COATED ORAL ONCE
Qty: 0 | Refills: 0 | Status: COMPLETED | OUTPATIENT
Start: 2018-09-01 | End: 2018-09-01

## 2018-09-01 RX ADMIN — ONDANSETRON 4 MILLIGRAM(S): 8 TABLET, FILM COATED ORAL at 02:22

## 2018-09-01 RX ADMIN — Medication 650 MILLIGRAM(S): at 01:37

## 2018-09-01 RX ADMIN — Medication 1 TABLET(S): at 17:08

## 2018-09-01 RX ADMIN — AMLODIPINE BESYLATE 10 MILLIGRAM(S): 2.5 TABLET ORAL at 06:27

## 2018-09-01 RX ADMIN — Medication 125 MEQ/KG/HR: at 21:48

## 2018-09-01 RX ADMIN — Medication 1 TABLET(S): at 19:27

## 2018-09-01 RX ADMIN — Medication 650 MILLIGRAM(S): at 02:37

## 2018-09-01 RX ADMIN — Medication 325 MILLIGRAM(S): at 12:49

## 2018-09-01 NOTE — PROGRESS NOTE ADULT - ASSESSMENT
33 years old female with Hx of recently diagnosed hypertension and migraines was treated with Propanolol for two weeks and thereafter prescribed lisinopril due to intolerance of propranolol as well as incidental finding of elevated prolactin level on labs with noted cessation of menstruation since April 2018; pt was referred from [facility] due to elevated creatinine, with concern for acute vs acute on chronic kidney failure unclear etiology. Renal US shows medical renal dz, so possible underlying CKD. Serology for differential diagnosis sent and testing. Renal Bx with hemorrhage and hematoma. H&H dropping to 7.0. Type and screen and consent pending for transfustion.    Acute on chronic kidney disease  - pt asymptomatic   - BUN:Cr ratio 10.7  - bun/ cr with slight down trend   - c/w IVF   - Hyperkalemia improved   - c/w monitoring renal function   - Anemia management as below   - F/U Renal doppler US in am   - renal US shows chronic medical renal dz component of ckd   - U/A with microscopy  protein: 100    -protein/cr ratio elevated   - ESR elevated   -CRP wnl   - f/u ALBA, C3/ C4/ C5, ds DNA, metanephrines   - c/w hensley catheter   - Hold nephrotoxic medications   - hepatitis panel (just in case needed for HD placement; will not place PPD unless pt needs HD)   - Nephrology consul noted and appreciated and recommended renal Bx  - IR consulted for bx     Hypertension  - Improving  - Holding Lisinopril at the moment due to LAITH   - c/w Amlodipine 10 mg PO and titrate as needed.    Anemia of chronic disease   - h/h stable   - Low TIBC, with low-normal Iron, ferritin and trasferrin   - given slightly lower ferritin could benefit from iron therapy   - Normocytic normochromic  -c/w ferrous sulfate 325mg po qd   - epogen as per nephro     Amenorrhea  - Likely secondary to hyperprolactinemia  - Will trend LH, FSH and prolactin - labs testing   - MRI brain with no evidence of pituitary adenoma - recommends dynamic pituitary study with contrast but pt is not able to receive contrast due to LAITH.   - Will consult endocrinology     Migraine   -c/w tylenol prn   -currently pt is asx     DVT prophylaxis  - Pt is ambulating   - scd boots b/l       Dispo: When pt is medically stable to return to home. Based on renal functioning and if worsens need for HD may need community HD center. 33 years old female with Hx of recently diagnosed hypertension and migraines was treated with Propanolol for two weeks and thereafter prescribed lisinopril due to intolerance of propranolol as well as incidental finding of elevated prolactin level on labs with noted cessation of menstruation since April 2018; pt was referred from [facility] due to elevated creatinine, with concern for acute vs acute on chronic kidney failure unclear etiology. Renal US shows medical renal dz, so possible underlying CKD. Serology for differential diagnosis sent and testing. Renal Bx with hemorrhage and hematoma. H&H dropped to 6.7. Two units pRBC pendding transfusion    Acute on chronic kidney disease  - pt asymptomatic   - BUN:Cr ratio 10.7  - BUN/cr with slight down trend   - c/w IVF   - Hyperkalemia improved   - c/w monitoring renal function   - Anemia management as below   - F/U Renal doppler US in am   - renal US shows chronic medical renal dz component of ckd   - U/A with microscopy  protein: 100      - ESR elevated   -CRP wnl   - f/u ALBA, C3/ C4/ C5, ds DNA, metanephrines   - c/w hensley catheter   - Hold nephrotoxic medications   - hepatitis panel (just in case needed for HD placement; will not place PPD unless pt needs HD)   - Nephrology consul noted and appreciated and recommended renal Bx  - IR consulted for bx     Hypertension  - Stable  - Holding Lisinopril at the moment due to LAITH   - C/W Amlodipine 10 mg PO and titrate as needed.    Acute on Chronic Anemia of chronic disease   - h/h stable   - Low TIBC, with low-normal Iron, ferritin and trasferrin   - given slightly lower ferritin could benefit from iron therapy   - Normocytic normochromic  -c/w ferrous sulfate 325mg po qd   - epogen as per nephro     Amenorrhea  - Likely secondary to hyperprolactinemia  - Will trend LH, FSH and prolactin - labs testing   - MRI brain with no evidence of pituitary adenoma - recommends dynamic pituitary study with contrast but pt is not able to receive contrast due to LAITH.   - Will consult endocrinology     Migraine   -c/w tylenol prn   -currently pt is asx     DVT prophylaxis  - Pt is ambulating   - scd boots b/l       Dispo: When pt is medically stable to return to home. Based on renal functioning and if worsens need for HD may need community HD center. 33 years old female with Hx of recently diagnosed hypertension and migraines was treated with Propanolol for two weeks and thereafter prescribed lisinopril due to intolerance of propranolol as well as incidental finding of elevated prolactin level on labs with noted cessation of menstruation since April 2018; pt was referred from [facility] due to elevated creatinine, with concern for acute vs acute on chronic kidney failure unclear etiology. Renal US shows medical renal dz, so possible underlying CKD. Serology for differential diagnosis sent and testing. Renal Bx with hemorrhage and hematoma. H&H dropped to 6.7. Two units pRBC pendding transfusion    Acute on chronic kidney disease  - pt asymptomatic   - BUN:Cr ratio 10.7  - BUN/cr with slight down trend   - c/w IVF   - Hyperkalemia improved   - c/w monitoring renal function   - Anemia management as below   - F/U Renal doppler US in am   - renal US shows chronic medical renal dz component of ckd   - U/A with microscopy  protein: 100      - ESR elevated   -CRP wnl   - f/u ALBA, C3/ C4/ C5, ds DNA, metanephrines   - F/U on morning serum Vitamin D and PTH (if D low and PTH high then likely CKD, otherwise likely acute)  - c/w hensley catheter   - Hold nephrotoxic medications   - hepatitis panel (just in case needed for HD placement; will not place PPD unless pt needs HD)   - Nephrology consul noted and appreciated and recommended renal Bx  - IR consulted for bx     Hypertension  - Stable  - Holding Lisinopril at the moment due to LAITH   - C/W Amlodipine 10 mg PO and titrate as needed.    Acute on Chronic Anemia of chronic disease   - h/h decreasing secondary to biopsy hemorrhage  - Transfuse 2 units pRBC  - Follow up H&H 4 hrs after 2nd unit   - Low TIBC, with low-normal Iron, ferritin and transferrin     - given slightly lower ferritin could benefit from iron therapy   - Normocytic normochromic  - c/w ferrous home sulfate 325mg po qd   - epogen as per nephro     Amenorrhea  - Likely secondary to hyperprolactinemia  - Will trend LH, FSH and prolactin - labs testing   - MRI brain with no evidence of pituitary adenoma - recommends dynamic pituitary study with contrast but pt is not able to receive contrast due to LAITH.   - Will consult endocrinology     Migraine   - Tylenol is not sufficient  - Started Fiorcet PRN    DVT prophylaxis  - Given hemorrhage, hold A/C  - Pt is ambulating   - scd boots b/l     Dispo: When pt is medically stable to return to home. Based on renal functioning and if worsens need for HD may need community HD center.

## 2018-09-01 NOTE — PROGRESS NOTE ADULT - ASSESSMENT
1) ATN  2) HTN - CKD related  3) Migraines  4) Acidosis    Awaiting results of renal biopsy  Awaiting serological workup  If no improvement; may need HD by tomorrow; not frankly uremic at this time; will hold off  Continue with fluid; hydration;  Check i/o  Transfuse 2 units PRBC  check iPTH to check for chronicity  Nephrotic range proteinuria; 142/19 = 7.47gm  Discussed with residents and Dr. Schultz during rounds this AM

## 2018-09-01 NOTE — PROGRESS NOTE ADULT - ATTENDING COMMENTS
Patient seen and examined at the bedside. Agree with the above history, physical, assessment, and plan with the necessary amendments/elaborations below:    clinically stable however noted to have acute on chronic anemia. suspect multifactorial from perinephric hematoma which complicated renal bx as well as anemia of chronic disease from renal failure. pt asymptomatic with no complaints but mild tachycardia likely related to hypovolemia as well as small inc in creatinine. 2u prbc to be given, post transfusion h/h 4hrs after completion. give another unit if < 7 or if acutely symptomatic. renal following, agree with plan. planning for poss starting of HD tomorrow pending on results of labs in AM. if so sx will need to place shiley first. still unclear etiology of renal failure, given age, would suspect autoimmune disease or vasculitis, bx results + lab work up still pending. fu renal us.     regarding migraine headache, chronic hx. avoiding nsaids due to renal failure. not controlled on tylenol. start fiorecet. fu. if not improved, will change to sumatriptan.     regarding prolactin elevation, amenorrhea secondary to elevation. no findings on imaging but not done with contrast given renal disease. if renal function improves will need mri with contrast. otherwise can consider initiation of dopamine agonists such as bromocriptine, pt should be followed by endocrinology. if still here on tues will consult, otherwise outpt fu.     abd pain + emesis x1 last night, resolved. no further intervention.

## 2018-09-01 NOTE — PROGRESS NOTE ADULT - SUBJECTIVE AND OBJECTIVE BOX
Patient is a 33y old  Female who presents with a chief complaint of Kidney failure (31 Aug 2018 01:19)    INTERVAL HPI/OVERNIGHT EVENTS:  Patient seen and examined at beside. Pt vomited x 1 (food). Patient has headache. Patient is on hensley catheter, draining clean urine. Pt has abdominal pain secondary to Bx. Patient denies chest pain, calf pain, fever, chills, dysuria, hematuria, diarrhea, constipation, SOB, palpitations. Rest of ROS not contributory except for above.    Allergies    No Known Allergies    Intolerances    Vital Signs Last 24 Hrs  T(C): 37.1 (01 Sep 2018 07:24), Max: 37.1 (01 Sep 2018 07:24)  T(F): 98.7 (01 Sep 2018 07:24), Max: 98.7 (01 Sep 2018 07:24)  HR: 87 (01 Sep 2018 07:24) (84 - 107)  BP: 130/80 (01 Sep 2018 07:24) (105/63 - 158/97)  BP(mean): --  RR: 20 (01 Sep 2018 07:24) (16 - 20)  SpO2: 100% (01 Sep 2018 02:59) (100% - 100%)      Daily     Daily   I&O's Detail    31 Aug 2018 07:01  -  01 Sep 2018 07:00  --------------------------------------------------------  IN:  Total IN: 0 mL    OUT:    Indwelling Catheter - Urethral: 1350 mL  Total OUT: 1350 mL    Total NET: -1350 mL    Physical Exam:   GENERAL: NAD, well-groomed, well-developed. Cooperative.  NECK: Supple, No JVD, Normal thyroid  NERVOUS SYSTEM:  Alert & Oriented X3, Good concentration; Motor Strength 5/5 B/L upper and lower extremities;   RESP: Clear to auscultation bilaterally; No rales, rhonchi, wheezing, or rubs  CVS: Regular rate and rhythm; No murmurs, rubs, or gallops.  GI: Soft, Nontender, Nondistended; Bowel sounds present, no CVA tenderness  : Hensley catheter in place with clear urine   EXTREMITIES:  2+ Peripheral Pulses, No clubbing, cyanosis, or edema  LYMPH: No cervical or supraclavicular lymphadenopathy noted  SKIN: Mild Jaundice possible    LABS:                        6.7    3.9   )-----------( 147      ( 01 Sep 2018 07:46 )             20.7     CBC Full  -  ( 01 Sep 2018 07:46 )  WBC Count : 3.9 K/uL  Hemoglobin : 6.7 g/dL  Hematocrit : 20.7 %  Platelet Count - Automated : 147 K/uL  Mean Cell Volume : 84.1 fl  Mean Cell Hemoglobin : 27.2 pg  Mean Cell Hemoglobin Concentration : 32.4 g/dL      144  |  109<H>  |  62.0<H>  ----------------------------<  81  4.9   |  18.0<L>  |  5.85<H>    Ca    9.0      31 Aug 2018 16:28  Phos  4.5       Mg     2.3         TPro  7.5  /  Alb  4.1  /  TBili  0.2<L>  /  DBili  x   /  AST  9   /  ALT  12  /  AlkPhos  75      PT/INR - ( 30 Aug 2018 22:42 )   PT: 11.6 sec;   INR: 1.05 ratio    PTT - ( 30 Aug 2018 22:42 )  PTT:31.6 sec    Urinalysis Basic - ( 31 Aug 2018 02:25 )  Color: Yellow / Appearance: Clear / S.005 / pH: x  Gluc: x / Ketone: Negative  / Bili: Negative / Urobili: Negative mg/dL   Blood: x / Protein: 100 mg/dL / Nitrite: Negative   Leuk Esterase: Negative / RBC: 6-10 /HPF / WBC 0-2   Sq Epi: x / Non Sq Epi: Moderate / Bacteria: Few    Hemoglobin A1C, Whole Blood: 5.2 % ( @ 06:22)    Serum Pro-Brain Natriuretic Peptide: 1983 pg/mL ( @ 06:22)  Serum Pro-Brain Natriuretic Peptide: 1712 pg/mL ( @ 22:42)    CARDIOVASCULAR:  amLODIPine   Tablet 10 milliGRAM(s) Oral daily    NEUROLOGIC:  acetaminophen   Tablet. 650 milliGRAM(s) Oral every 6 hours PRN  oxyCODONE    5 mG/acetaminophen 325 mG 2 Tablet(s) Oral every 6 hours PRN    IV FLUID/NUTRITION:  ferrous    sulfate 325 milliGRAM(s) Oral daily  sodium bicarbonate  Infusion 0.183 mEq/kG/Hr IV Continuous <Continuous>      RADIOLOGY & ADDITIONAL TESTS: Patient is a 33y old  Female who presents with a chief complaint of Kidney failure (31 Aug 2018 01:19)    INTERVAL HPI/OVERNIGHT EVENTS:  Patient seen and examined at beside. Pt vomited x 1 (food). Patient has headache. Patient is on hensley catheter, draining clean urine. Pt has right sided abdominal pain secondary to Bx. Patient denies chest pain, calf pain, fever, chills, dysuria, hematuria, diarrhea, constipation, SOB, palpitations. Rest of ROS not contributory except for above.    Allergies    No Known Allergies    Intolerances    Vital Signs Last 24 Hrs  T(C): 37.1 (01 Sep 2018 07:24), Max: 37.1 (01 Sep 2018 07:24)  T(F): 98.7 (01 Sep 2018 07:24), Max: 98.7 (01 Sep 2018 07:24)  HR: 87 (01 Sep 2018 07:24) (84 - 107)  BP: 130/80 (01 Sep 2018 07:24) (105/63 - 158/97)  RR: 20 (01 Sep 2018 07:24) (16 - 20)  SpO2: 100% (01 Sep 2018 02:59) (100% - 100%)    Daily     Daily   I&O's Detail    31 Aug 2018 07:01  -  01 Sep 2018 07:00  --------------------------------------------------------  IN:  Total IN: 0 mL    OUT:    Indwelling Catheter - Urethral: 1350 mL  Total OUT: 1350 mL    Total NET: -1350 mL    Physical Exam:   GENERAL: NAD, well-groomed, well-developed. Cooperative.  NECK: Supple, No JVD, Normal thyroid  NERVOUS SYSTEM:  Alert & Oriented X3, Good concentration; Motor Strength 5/5 B/L upper and lower extremities;   RESP: Clear to auscultation bilaterally; No rales, rhonchi, wheezing, or rubs  CVS: Regular rate and rhythm; No murmurs, rubs, or gallops.  GI: Tender to palpation RUQ and RLQ, Soft, Nondistended; Bowel sounds present, no CVA tenderness  : Hensley catheter in place with clear urine   EXTREMITIES:  2+ Peripheral Pulses, No clubbing, cyanosis, or edema  LYMPH: No cervical or supraclavicular lymphadenopathy noted  SKIN: Mild Jaundice possible    LABS:                        6.7    3.9   )-----------( 147      ( 01 Sep 2018 07:46 )             20.7     CBC Full  -  ( 01 Sep 2018 07:46 )  WBC Count : 3.9 K/uL  Hemoglobin : 6.7 g/dL  Hematocrit : 20.7 %  Platelet Count - Automated : 147 K/uL  Mean Cell Volume : 84.1 fl  Mean Cell Hemoglobin : 27.2 pg  Mean Cell Hemoglobin Concentration : 32.4 g/dL      144  |  109<H>  |  62.0<H>  ----------------------------<  81  4.9   |  18.0<L>  |  5.85<H>    Ca    9.0      31 Aug 2018 16:28  Phos  4.5       Mg     2.3         TPro  7.5  /  Alb  4.1  /  TBili  0.2<L>  /  DBili  x   /  AST  9   /  ALT  12  /  AlkPhos  75      PT/INR - ( 30 Aug 2018 22:42 )   PT: 11.6 sec;   INR: 1.05 ratio    PTT - ( 30 Aug 2018 22:42 )  PTT:31.6 sec    Urinalysis Basic - ( 31 Aug 2018 02:25 )  Color: Yellow / Appearance: Clear / S.005 / pH: x  Gluc: x / Ketone: Negative  / Bili: Negative / Urobili: Negative mg/dL   Blood: x / Protein: 100 mg/dL / Nitrite: Negative   Leuk Esterase: Negative / RBC: 6-10 /HPF / WBC 0-2   Sq Epi: x / Non Sq Epi: Moderate / Bacteria: Few    Hemoglobin A1C, Whole Blood: 5.2 % ( @ 06:22)    Serum Pro-Brain Natriuretic Peptide: 1983 pg/mL ( @ 06:22)  Serum Pro-Brain Natriuretic Peptide: 1712 pg/mL ( @ 22:42)    CARDIOVASCULAR:  amLODIPine   Tablet 10 milliGRAM(s) Oral daily    NEUROLOGIC:  acetaminophen   Tablet. 650 milliGRAM(s) Oral every 6 hours PRN  oxyCODONE    5 mG/acetaminophen 325 mG 2 Tablet(s) Oral every 6 hours PRN    IV FLUID/NUTRITION:  ferrous    sulfate 325 milliGRAM(s) Oral daily  sodium bicarbonate  Infusion 0.183 mEq/kG/Hr IV Continuous <Continuous>      RADIOLOGY & ADDITIONAL TESTS:

## 2018-09-02 LAB
ANION GAP SERPL CALC-SCNC: 13 MMOL/L — SIGNIFICANT CHANGE UP (ref 5–17)
BUN SERPL-MCNC: 53 MG/DL — HIGH (ref 8–20)
CALCIUM SERPL-MCNC: 8.8 MG/DL — SIGNIFICANT CHANGE UP (ref 8.4–10.5)
CALCIUM SERPL-MCNC: 8.8 MG/DL — SIGNIFICANT CHANGE UP (ref 8.4–10.5)
CALCIUM SERPL-MCNC: 8.9 MG/DL — SIGNIFICANT CHANGE UP (ref 8.6–10.2)
CHLORIDE SERPL-SCNC: 104 MMOL/L — SIGNIFICANT CHANGE UP (ref 98–107)
CO2 SERPL-SCNC: 26 MMOL/L — SIGNIFICANT CHANGE UP (ref 22–29)
CREAT SERPL-MCNC: 6.39 MG/DL — HIGH (ref 0.5–1.3)
GLUCOSE SERPL-MCNC: 94 MG/DL — SIGNIFICANT CHANGE UP (ref 70–115)
HCT VFR BLD CALC: 28.5 % — LOW (ref 37–47)
HGB BLD-MCNC: 9.4 G/DL — LOW (ref 12–16)
POTASSIUM SERPL-MCNC: 4.4 MMOL/L — SIGNIFICANT CHANGE UP (ref 3.5–5.3)
POTASSIUM SERPL-SCNC: 4.4 MMOL/L — SIGNIFICANT CHANGE UP (ref 3.5–5.3)
PTH-INTACT FLD-MCNC: 206 PG/ML — HIGH (ref 15–65)
SODIUM SERPL-SCNC: 143 MMOL/L — SIGNIFICANT CHANGE UP (ref 135–145)
T PALLIDUM AB TITR SER: NEGATIVE — SIGNIFICANT CHANGE UP

## 2018-09-02 PROCEDURE — 99233 SBSQ HOSP IP/OBS HIGH 50: CPT | Mod: GC

## 2018-09-02 PROCEDURE — 99233 SBSQ HOSP IP/OBS HIGH 50: CPT

## 2018-09-02 RX ORDER — ERYTHROPOIETIN 10000 [IU]/ML
40000 INJECTION, SOLUTION INTRAVENOUS; SUBCUTANEOUS ONCE
Qty: 0 | Refills: 0 | Status: COMPLETED | OUTPATIENT
Start: 2018-09-02 | End: 2018-09-02

## 2018-09-02 RX ORDER — INFLUENZA VIRUS VACCINE 15; 15; 15; 15 UG/.5ML; UG/.5ML; UG/.5ML; UG/.5ML
0.5 SUSPENSION INTRAMUSCULAR ONCE
Qty: 0 | Refills: 0 | Status: DISCONTINUED | OUTPATIENT
Start: 2018-09-02 | End: 2018-09-04

## 2018-09-02 RX ADMIN — Medication 1 TABLET(S): at 09:57

## 2018-09-02 RX ADMIN — Medication 125 MEQ/KG/HR: at 05:57

## 2018-09-02 RX ADMIN — OXYCODONE AND ACETAMINOPHEN 2 TABLET(S): 5; 325 TABLET ORAL at 01:06

## 2018-09-02 RX ADMIN — Medication 325 MILLIGRAM(S): at 12:11

## 2018-09-02 RX ADMIN — ERYTHROPOIETIN 40000 UNIT(S): 10000 INJECTION, SOLUTION INTRAVENOUS; SUBCUTANEOUS at 18:39

## 2018-09-02 RX ADMIN — OXYCODONE AND ACETAMINOPHEN 2 TABLET(S): 5; 325 TABLET ORAL at 02:06

## 2018-09-02 RX ADMIN — AMLODIPINE BESYLATE 10 MILLIGRAM(S): 2.5 TABLET ORAL at 05:57

## 2018-09-02 RX ADMIN — Medication 1 TABLET(S): at 09:06

## 2018-09-02 NOTE — PROGRESS NOTE ADULT - ASSESSMENT
33 years old female with Hx of recently diagnosed hypertension and migraines was treated with Propanolol for two weeks and thereafter prescribed lisinopril due to intolerance of propranolol as well as incidental finding of elevated prolactin level on labs with noted cessation of menstruation since April 2018; pt was referred from [facility] due to elevated creatinine, with concern for acute vs acute on chronic kidney failure unclear etiology. Renal US shows medical renal dz, so possible underlying CKD. Serology for differential diagnosis sent and testing. Renal Bx with hemorrhage and hematoma. H&H dropped to 6.7 and patient was transfused 2 units of pRBC showing elevation of Hb to 9. Pending results of renal bx. Nephro continues to follow the case.     Acute on chronic kidney disease  - pt asymptomatic   - BUN:Cr ratio 10.7  - BUN/cr with slight down trend   - c/w IVF   - Hyperkalemia improved   - c/w monitoring renal function   - Anemia management as below   - F/U Renal doppler US in am   - renal US shows chronic medical renal dz component of ckd   - U/A with microscopy  protein: 100      - ESR elevated   -CRP wnl   - f/u ALBA, C3/ C4/ C5, ds DNA, metanephrines   - F/U on morning serum Vitamin D and PTH (if D low and PTH high then likely CKD, otherwise likely acute)  - c/w hensley catheter   - Hold nephrotoxic medications   - hepatitis panel (just in case needed for HD placement; will not place PPD unless pt needs HD)   - Nephrology consul noted and appreciated and recommended renal Bx  - IR consulted for bx     Hypertension  - Stable  - Holding Lisinopril at the moment due to LAITH   - C/W Amlodipine 10 mg PO and titrate as needed.    Acute on Chronic Anemia of chronic disease   - h/h decreasing secondary to biopsy hemorrhage  - Transfuse 2 units pRBC  - Follow up H&H 4 hrs after 2nd unit   - Low TIBC, with low-normal Iron, ferritin and transferrin     - given slightly lower ferritin could benefit from iron therapy   - Normocytic normochromic  - c/w ferrous home sulfate 325mg po qd   - epogen as per nephro     Amenorrhea  - Likely secondary to hyperprolactinemia  - Will trend LH, FSH and prolactin - labs testing   - MRI brain with no evidence of pituitary adenoma - recommends dynamic pituitary study with contrast but pt is not able to receive contrast due to LAITH.   - Will consult endocrinology     Migraine   - Tylenol is not sufficient  - Started Fiorcet PRN    DVT prophylaxis  - Given hemorrhage, hold A/C  - Pt is ambulating   - scd boots b/l     Dispo: When pt is medically stable to return to home. Based on renal functioning and if worsens need for HD may need community HD center. 33 years old female with Hx of recently diagnosed hypertension and migraines was treated with Propanolol for two weeks and thereafter prescribed lisinopril due to intolerance of propranolol as well as incidental finding of elevated prolactin level on labs with noted cessation of menstruation since April 2018; pt was referred from [facility] due to elevated creatinine, with concern for acute vs acute on chronic kidney failure unclear etiology. Renal US shows medical renal dz, so possible underlying CKD. Serology for differential diagnosis sent and testing. Renal Bx with hemorrhage and hematoma. H&H dropped to 6.7 and patient was transfused 2 units of pRBC showing elevation of Hb to 9. Pending results of renal bx. Nephro continues to follow the case.     Acute on chronic kidney disease  - Sustained.  -pt asymptomatic   - BUN:Cr ratio 8.29  - BUN/cr with slight down trend, but Cr sustained on 6.   - c/w IVF   - Hyperkalemia resolved.  - c/w monitoring renal function   - Renal doppler US -- > no signs of stenosis, US showing no changes compared to admission.  - U/A with microscopy  protein: 100      - ESR elevated   -CRP wnl   - C3 mildly decreased discussed with dr. Littlejohn who recommended to hold corticosteroid therapy for now waiting for the other workup ordered.  -F/u ALBA, metanephrines, ds DNA  -F/U cmp in am  -PTH elevated -->Indicates ckd  - D/c [facility] catheter  - Hold nephrotoxic medications   - hepatitis panel negative  - Nephrology consul noted and appreciated    S/p Renal Biopsy  -complicated with perirenal hemorrhage -->resolving.  -Drop in Hb needed 2 units of pRBCs  -C/w pain control with Tylenol 650 mg q6 for mild pain and Percocet 5/350 mg q6 for moderate-severe pain.  -F/u Bx results -->Probably Tuesday/ Wednesday     Hypertension  - Stable  - Holding Lisinopril at the moment due to LAITH   - C/W Amlodipine 10 mg PO and titrate as needed.    Acute on Chronic Anemia of chronic disease   - Low TIBC, with low-normal Iron, ferritin and transferrin    - h/h decreasing secondary to biopsy hemorrhage  - Transfuse 2 units pRBC  - Follow up cbc on am   - given slightly lower ferritin could benefit from iron therapy   - Normocytic normochromic  - c/w ferrous home sulfate 325mg po qd   - epogen as per nephro     Amenorrhea  - Likely secondary to hyperprolactinemia  - Will trend LH, FSH and prolactin - labs testing   - MRI brain with no evidence of pituitary adenoma - recommends dynamic pituitary study with contrast but pt is not able to receive contrast due to LAITH.   - Will consult endocrinology     Migraine   - Tylenol is not sufficient  - Started Fiorcet PRN    DVT prophylaxis  - Given hemorrhage, hold A/C  - Pt is ambulating   - scd boots b/l     Dispo: When pt is medically stable to return to home. Based on renal functioning and if worsens need for HD may need community HD center.

## 2018-09-02 NOTE — PROGRESS NOTE ADULT - SUBJECTIVE AND OBJECTIVE BOX
NOTE IN PROGRESS    cc: Patient is a 33y old  Female who presents with a chief complaint of Kidney failure (31 Aug 2018 01:19)    -->Follow up by Nephro: Dr. Littlejohn    INTERVAL HPI/OVERNIGHT EVENTS:  Patient seen and examined at beside.     Allergies: No Known Allergies    I&O's Summary    31 Aug 2018 07:01  -  01 Sep 2018 07:00  --------------------------------------------------------  IN: 0 mL / OUT: 1350 mL / NET: -1350 mL    01 Sep 2018 07:01  -  02 Sep 2018 05:32  --------------------------------------------------------  IN: 1335 mL / OUT: 2780 mL / NET: -1445 mL      Vital Signs Last 24 Hrs  Vital Signs Last 24 Hrs  T(C): 36.8 (02 Sep 2018 05:17), Max: 37.1 (01 Sep 2018 07:24)  T(F): 98.3 (02 Sep 2018 05:17), Max: 98.7 (01 Sep 2018 07:24)  HR: 110 (02 Sep 2018 05:17) (87 - 112)  BP: 120/74 (02 Sep 2018 05:17) (113/70 - 151/94)  RR: 20 (02 Sep 2018 05:17) (18 - 20)  SpO2: 99% (02 Sep 2018 05:17) (98% - 100%)      Physical Exam:   GENERAL: NAD, well-groomed, well-developed. Cooperative.  NECK: Supple, No JVD, Normal thyroid  NERVOUS SYSTEM:  Alert & Oriented X3, Good concentration; Motor Strength 5/5 B/L upper and lower extremities;   RESP: Clear to auscultation bilaterally; No rales, rhonchi, wheezing, or rubs  CVS: Regular rate and rhythm; No murmurs, rubs, or gallops.  GI: Tender to palpation RUQ and RLQ, Soft, Nondistended; Bowel sounds present, no CVA tenderness  : Mcleod catheter in place with clear urine   EXTREMITIES:  2+ Peripheral Pulses, No clubbing, cyanosis, or edema  LYMPH: No cervical or supraclavicular lymphadenopathy noted  SKIN: Mild Jaundice possible    LABS:                            9.4    x     )-----------( x        ( 02 Sep 2018 01:18 )             28.5       143  |  104  |  53.0<H>  ----------------------------<  94  4.4   |  26.0  |  6.39<H>    Ca    8.9      02 Sep 2018 01:18  Phos  4.5       Mg     2.3         TPro  6.1<L>  /  Alb  3.3  /  TBili  0.3<L>  /  DBili  x   /  AST  8   /  ALT  9   /  AlkPhos  60      MIRIAM Kappa: 13.37 mg/dL (18 @ 19:52)  MIRIAM Lambda: 15.70 mg/dL (18 @ 19:52)    Intact PTH: 181: PTH METHOD: Roche  Calcium, Total Serum: 8.9 mg/dL (18 @ 01:18)             ______________________________________________________________________                      6.7    3.9   )-----------( 147      ( 01 Sep 2018 07:46 )             20.7     CBC Full  -  ( 01 Sep 2018 07:46 )  WBC Count : 3.9 K/uL  Hemoglobin : 6.7 g/dL  Hematocrit : 20.7 %  Platelet Count - Automated : 147 K/uL  Mean Cell Volume : 84.1 fl  Mean Cell Hemoglobin : 27.2 pg  Mean Cell Hemoglobin Concentration : 32.4 g/dL      144  |  109<H>  |  62.0<H>  ----------------------------<  81  4.9   |  18.0<L>  |  5.85<H>    Ca    9.0      31 Aug 2018 16:28  Phos  4.5       Mg     2.3         TPro  7.5  /  Alb  4.1  /  TBili  0.2<L>  /  DBili  x   /  AST  9   /  ALT  12  /  AlkPhos  75      PT/INR - ( 30 Aug 2018 22:42 )   PT: 11.6 sec;   INR: 1.05 ratio    PTT - ( 30 Aug 2018 22:42 )  PTT:31.6 sec    Urinalysis Basic - ( 31 Aug 2018 02:25 )  Color: Yellow / Appearance: Clear / S.005 / pH: x  Gluc: x / Ketone: Negative  / Bili: Negative / Urobili: Negative mg/dL   Blood: x / Protein: 100 mg/dL / Nitrite: Negative   Leuk Esterase: Negative / RBC: 6-10 /HPF / WBC 0-2   Sq Epi: x / Non Sq Epi: Moderate / Bacteria: Few    Hemoglobin A1C, Whole Blood: 5.2 % ( @ 06:22)    Serum Pro-Brain Natriuretic Peptide: 1983 pg/mL ( @ 06:22)  Serum Pro-Brain Natriuretic Peptide: 1712 pg/mL ( @ 22:42)  ___________________________________________________________________________________  US Duplex Kidneys (18 @ 14:51) >  IMPRESSION:     No evidence of a significant renal artery stenosis.    Echogenic kidneys compatible with medical renal disease.    Right renal perinephric collection compatible with the known right   perinephrichematoma.    < end of copied text >  _____________________________________________________________________________________    MEDICATIONS  (STANDING):  amLODIPine   Tablet 10 milliGRAM(s) Oral daily  ferrous    sulfate 325 milliGRAM(s) Oral daily  sodium bicarbonate  Infusion 0.183 mEq/kG/Hr (125 mL/Hr) IV Continuous <Continuous>    MEDICATIONS  (PRN):  acetaminophen   Tablet. 650 milliGRAM(s) Oral every 6 hours PRN Moderate Pain (4 - 6)  acetaminophen 325 mG/butalbital 50 mG/caffeine 40 mG 1 Tablet(s) Oral every 6 hours PRN headache  oxyCODONE    5 mG/acetaminophen 325 mG 2 Tablet(s) Oral every 6 hours PRN Severe Pain (7 - 10) cc: Patient is a 33y old  Female who presents with a chief complaint of Kidney failure (31 Aug 2018 01:19)    -->Follow up by Nephro: Dr. Littlejohn    INTERVAL HPI/OVERNIGHT EVENTS:  Patient seen and examined at beside. As per nurse, patient presented tachycardia and low hemoglobin in night ours that needed transfusion of 2 units of pRBCs, w/o complications after transfusion. Patient presented pain on bx incision and headache during the night that alleviated with pain medication w/o subsequent episodes. Patient refers feeling better after transfusion, less tired and no more palpitations. Patient last BM 2 days ago, and is Mcleod is actively draining clear urine.  Patient states that she has hope for her kidney function will improve despite explaining in several opportunities that she will probably need HD. Patient denies nausea/Vomiting, fever, calf pain, SOB, diarrhea. Rest of ROS negative except for above.    Allergies: No Known Allergies    I&O's Summary    01 Sep 2018 07:  -  02 Sep 2018 07:00  --------------------------------------------------------  IN: 1335 mL / OUT: 2780 mL / NET: -1445 mL    02 Sep 2018 07:  -  02 Sep 2018 15:54  --------------------------------------------------------  IN: 0 mL / OUT: 1470 mL / NET: -1470 mL      Vital Signs Last 24 Hrs  Vital Signs Last 24 Hrs  T(C): 36.8 (02 Sep 2018 05:17), Max: 37.1 (01 Sep 2018 07:24)  T(F): 98.3 (02 Sep 2018 05:17), Max: 98.7 (01 Sep 2018 07:24)  HR: 110 (02 Sep 2018 05:17) (87 - 112)  BP: 120/74 (02 Sep 2018 05:17) (113/70 - 151/94)  RR: 20 (02 Sep 2018 05:17) (18 - 20)  SpO2: 99% (02 Sep 2018 05:17) (98% - 100%)      Physical Exam:   GENERAL: NAD, well-groomed, well-developed. Cooperative. Afebrile, eupneic.  SKIN: Decreased pallor compared to admission. There was mild palpebral edema w/o limitation to eye movements.   NECK: Supple, No JVD, Normal thyroid  NERVOUS SYSTEM:  Alert & Oriented X3, Good concentration; No focal signs.   RESP: Clear to auscultation bilaterally; No rales, rhonchi, wheezing, or rubs.  CVS: Regular rate and rhythm; No murmurs, rubs, or gallops. Capillary refill <2s  GI: On the back incision is noted with clean/dry dressing, no active exudate/bleeding, no hematoma was observed. Bowel sounds are present. Abdomen in soft and non tender to palpation.   : Mcleod catheter in place, draining clear urine.  EXTREMITIES:  2+ Peripheral Pulses. Mild edema on hands. No clubbing.    LYMPH: No cervical or supraclavicular lymphadenopathy noted    LABS:               9.4    x     )-----------( x        ( 02 Sep 2018 01:18 )             28.5   09-    143  |  104  |  53.0<H>  ----------------------------<  94  4.4   |  26.0  |  6.39<H>    Ca    8.9      02 Sep 2018 01:18  Phos  4.5     08-31  Mg     2.3     08-31    TPro  6.1<L>  /  Alb  3.3  /  TBili  0.3<L>  /  DBili  x   /  AST  8   /  ALT  9   /  AlkPhos  60  -    MIRIAM Kappa: 13.37 mg/dL (18 @ 19:52)  MIRIAM Lambda: 15.70 mg/dL (18 @ 19:52)    Intact PTH: 181: PTH METHOD: Roche  Calcium, Total Serum: 8.9 mg/dL (18 @ 01:18)             ______________________________________________________________________                      6.7    3.9   )-----------( 147      ( 01 Sep 2018 07:46 )             20.7     CBC Full  -  ( 01 Sep 2018 07:46 )  WBC Count : 3.9 K/uL  Hemoglobin : 6.7 g/dL  Hematocrit : 20.7 %  Platelet Count - Automated : 147 K/uL  Mean Cell Volume : 84.1 fl  Mean Cell Hemoglobin : 27.2 pg  Mean Cell Hemoglobin Concentration : 32.4 g/dL      144  |  109<H>  |  62.0<H>  ----------------------------<  81  4.9   |  18.0<L>  |  5.85<H>    Ca    9.0      31 Aug 2018 16:28  Phos  4.5       Mg     2.3         TPro  7.5  /  Alb  4.1  /  TBili  0.2<L>  /  DBili  x   /  AST  9   /  ALT  12  /  AlkPhos  75      PT/INR - ( 30 Aug 2018 22:42 )   PT: 11.6 sec;   INR: 1.05 ratio    PTT - ( 30 Aug 2018 22:42 )  PTT:31.6 sec    Urinalysis Basic - ( 31 Aug 2018 02:25 )  Color: Yellow / Appearance: Clear / S.005 / pH: x  Gluc: x / Ketone: Negative  / Bili: Negative / Urobili: Negative mg/dL   Blood: x / Protein: 100 mg/dL / Nitrite: Negative   Leuk Esterase: Negative / RBC: 6-10 /HPF / WBC 0-2   Sq Epi: x / Non Sq Epi: Moderate / Bacteria: Few    Hemoglobin A1C, Whole Blood: 5.2 % ( @ 06:22)    Serum Pro-Brain Natriuretic Peptide: 1983 pg/mL ( @ 06:22)  Serum Pro-Brain Natriuretic Peptide: 1712 pg/mL ( @ 22:42)  ___________________________________________________________________________________  US Duplex Kidneys (18 @ 14:51) >  IMPRESSION:     No evidence of a significant renal artery stenosis.    Echogenic kidneys compatible with medical renal disease.    Right renal perinephric collection compatible with the known right   perinephrichematoma.    < end of copied text >  _____________________________________________________________________________________    MEDICATIONS  (STANDING):  amLODIPine   Tablet 10 milliGRAM(s) Oral daily  ferrous    sulfate 325 milliGRAM(s) Oral daily  sodium bicarbonate  Infusion 0.183 mEq/kG/Hr (125 mL/Hr) IV Continuous <Continuous>    MEDICATIONS  (PRN):  acetaminophen   Tablet. 650 milliGRAM(s) Oral every 6 hours PRN Moderate Pain (4 - 6)  acetaminophen 325 mG/butalbital 50 mG/caffeine 40 mG 1 Tablet(s) Oral every 6 hours PRN headache  oxyCODONE    5 mG/acetaminophen 325 mG 2 Tablet(s) Oral every 6 hours PRN Severe Pain (7 - 10) cc: Patient is a 33y old  Female who presents with a chief complaint of Kidney failure (31 Aug 2018 01:19)    -->Followed by Nephro: Dr. Littlejohn    INTERVAL HPI/OVERNIGHT EVENTS:  Patient seen and examined at beside. As per nurse, patient presented tachycardia and low hemoglobin in night ours that needed transfusion of 2 units of pRBCs, w/o complications after transfusion. Patient presented pain on bx incision and headache during the night that alleviated with pain medication w/o subsequent episodes. Patient refers feeling better after transfusion, less tired and no more palpitations. Patient last BM 2 days ago, and is Mcleod is actively draining clear urine.  Patient states that she has hope for her kidney function will improve despite explaining in several opportunities that she will probably need HD. Patient denies nausea/Vomiting, fever, calf pain, SOB, diarrhea. Rest of ROS negative except for above.    Allergies: No Known Allergies    I&O's Summary    01 Sep 2018 07:  -  02 Sep 2018 07:00  --------------------------------------------------------  IN: 1335 mL / OUT: 2780 mL / NET: -1445 mL    02 Sep 2018 07:  -  02 Sep 2018 15:54  --------------------------------------------------------  IN: 0 mL / OUT: 1470 mL / NET: -1470 mL      Vital Signs Last 24 Hrs  Vital Signs Last 24 Hrs  T(C): 36.8 (02 Sep 2018 05:17), Max: 37.1 (01 Sep 2018 07:24)  T(F): 98.3 (02 Sep 2018 05:17), Max: 98.7 (01 Sep 2018 07:24)  HR: 110 (02 Sep 2018 05:17) (87 - 112)  BP: 120/74 (02 Sep 2018 05:17) (113/70 - 151/94)  RR: 20 (02 Sep 2018 05:17) (18 - 20)  SpO2: 99% (02 Sep 2018 05:17) (98% - 100%)      Physical Exam:   GENERAL: NAD, well-groomed, well-developed. Cooperative. Afebrile, eupneic.  SKIN: Decreased pallor compared to admission. There was mild palpebral edema w/o limitation to eye movements.   NECK: Supple, No JVD, Normal thyroid  NERVOUS SYSTEM:  Alert & Oriented X3, Good concentration; No focal signs.   RESP: Clear to auscultation bilaterally; No rales, rhonchi, wheezing, or rubs.  CVS: Regular rate and rhythm; No murmurs, rubs, or gallops. Capillary refill <2s  GI: On the back incision is noted with clean/dry dressing, no active exudate/bleeding, no hematoma was observed. Bowel sounds are present. Abdomen in soft and non tender to palpation.   : Mcleod catheter in place, draining clear urine.  EXTREMITIES:  2+ Peripheral Pulses. Mild edema on hands. No clubbing.    LYMPH: No cervical or supraclavicular lymphadenopathy noted    LABS:               9.4    x     )-----------( x        ( 02 Sep 2018 01:18 )             28.5   09-    143  |  104  |  53.0<H>  ----------------------------<  94  4.4   |  26.0  |  6.39<H>    Ca    8.9      02 Sep 2018 01:18  Phos  4.5     08-31  Mg     2.3     08-31    TPro  6.1<L>  /  Alb  3.3  /  TBili  0.3<L>  /  DBili  x   /  AST  8   /  ALT  9   /  AlkPhos  60  -    MIRIAM Kappa: 13.37 mg/dL (18 @ 19:52)  MIRIAM Lambda: 15.70 mg/dL (18 @ 19:52)    Intact PTH: 181: PTH METHOD: Roche  Calcium, Total Serum: 8.9 mg/dL (18 @ 01:18)             ______________________________________________________________________                      6.7    3.9   )-----------( 147      ( 01 Sep 2018 07:46 )             20.7     CBC Full  -  ( 01 Sep 2018 07:46 )  WBC Count : 3.9 K/uL  Hemoglobin : 6.7 g/dL  Hematocrit : 20.7 %  Platelet Count - Automated : 147 K/uL  Mean Cell Volume : 84.1 fl  Mean Cell Hemoglobin : 27.2 pg  Mean Cell Hemoglobin Concentration : 32.4 g/dL      144  |  109<H>  |  62.0<H>  ----------------------------<  81  4.9   |  18.0<L>  |  5.85<H>    Ca    9.0      31 Aug 2018 16:28  Phos  4.5       Mg     2.3         TPro  7.5  /  Alb  4.1  /  TBili  0.2<L>  /  DBili  x   /  AST  9   /  ALT  12  /  AlkPhos  75      PT/INR - ( 30 Aug 2018 22:42 )   PT: 11.6 sec;   INR: 1.05 ratio    PTT - ( 30 Aug 2018 22:42 )  PTT:31.6 sec    Urinalysis Basic - ( 31 Aug 2018 02:25 )  Color: Yellow / Appearance: Clear / S.005 / pH: x  Gluc: x / Ketone: Negative  / Bili: Negative / Urobili: Negative mg/dL   Blood: x / Protein: 100 mg/dL / Nitrite: Negative   Leuk Esterase: Negative / RBC: 6-10 /HPF / WBC 0-2   Sq Epi: x / Non Sq Epi: Moderate / Bacteria: Few    Hemoglobin A1C, Whole Blood: 5.2 % ( @ 06:22)    Serum Pro-Brain Natriuretic Peptide: 1983 pg/mL ( @ 06:22)  Serum Pro-Brain Natriuretic Peptide: 1712 pg/mL ( @ 22:42)  ___________________________________________________________________________________  US Duplex Kidneys (18 @ 14:51) >  IMPRESSION:     No evidence of a significant renal artery stenosis.    Echogenic kidneys compatible with medical renal disease.    Right renal perinephric collection compatible with the known right   perinephrichematoma.    < end of copied text >  _____________________________________________________________________________________    MEDICATIONS  (STANDING):  amLODIPine   Tablet 10 milliGRAM(s) Oral daily  ferrous    sulfate 325 milliGRAM(s) Oral daily  sodium bicarbonate  Infusion 0.183 mEq/kG/Hr (125 mL/Hr) IV Continuous <Continuous>    MEDICATIONS  (PRN):  acetaminophen   Tablet. 650 milliGRAM(s) Oral every 6 hours PRN Moderate Pain (4 - 6)  acetaminophen 325 mG/butalbital 50 mG/caffeine 40 mG 1 Tablet(s) Oral every 6 hours PRN headache  oxyCODONE    5 mG/acetaminophen 325 mG 2 Tablet(s) Oral every 6 hours PRN Severe Pain (7 - 10)

## 2018-09-02 NOTE — PROVIDER CONTACT NOTE (OTHER) - SITUATION
On CM pt . Upon assessment pt observed praying with family in room.  /72, Apical , RR20, O2sat 98 RA.  Pt stated I went to bathroom and came back.

## 2018-09-02 NOTE — CHART NOTE - NSCHARTNOTEFT_GEN_A_CORE
Received call from NP in charge of Pt La due to non sustained HR of 150 per min on monitor. As per nurse, the patient was praying when the event was recoded.    The patient was lying comfortably on bed, she refers felling fine and denied SOB, chest pain, abdominal pain, LOC, lightheadedness, nausea, vomiting. She confirmed she was prying with a group of 8 people in her room, and that previously she had been walking to the bathroom.    Vitals on examination:  BP: 140/76 mmHg  HR: 110 per minute  RR: 16 per min  Temperature: 97.7    On physical exam: Alert and oriented x3, NAD, resting on bed. -CV: tachycardic. Regular rhythm. No rubs, gallops, murmurs. -Respiratory: Eupneic. No retractions. Clean on auscultation. -Extremities: Peripheral pulses present, no cyanosis.  -Neurologic: No gross deficits.    Assessment: - C/w current management. -Will follow up in afternoon to assess BP and HR.

## 2018-09-02 NOTE — CONSULT NOTE ADULT - SUBJECTIVE AND OBJECTIVE BOX
33 year old female with PMHx of recently diagnosed hypertension who was sent from [facility] due to elevated BUN and Cr. Vascular team surgery consulted for permacath placement. Patient seen and examined at bedside. Reports that she is aware that she needs dialysis at some point but would like to wait as long as feasibly possible.     Of note per chart review, patient was previously diagnosed with HTN on 07/2018 during a sick visit with a BP reading of 187/109 mmHg complaining of headaches and was placed on propanolol that she took it for about 2 weeks and self discontinued due to dry mouth. She was seen again for a complete physical examination on 08/23/2018; That day, blood work was ordered and she was placed on Lisinopril 10mg PO daily which she has been taking since then. She was contacted over the phone and was made aware of the results and subsequently came to the ED for evaluation.     PAST MEDICAL HISTORY:  Hypertension, unspecified type  Migraine without aura, not intractable, without status migrainosus  HTN (hypertension)    PAST SURGICAL HISTORY:  History of bilateral tubal ligation    ALLERGIES:  No Known Allergies    MEDICATIONS  (STANDING):  amLODIPine   Tablet 10 milliGRAM(s) Oral daily  ferrous    sulfate 325 milliGRAM(s) Oral daily  influenza   Vaccine 0.5 milliLiter(s) IntraMuscular once  sodium bicarbonate  Infusion 0.183 mEq/kG/Hr (125 mL/Hr) IV Continuous <Continuous>    MEDICATIONS  (PRN):  acetaminophen   Tablet. 650 milliGRAM(s) Oral every 6 hours PRN Moderate Pain (4 - 6)  acetaminophen 325 mG/butalbital 50 mG/caffeine 40 mG 1 Tablet(s) Oral every 6 hours PRN headache  oxyCODONE    5 mG/acetaminophen 325 mG 2 Tablet(s) Oral every 6 hours PRN Severe Pain (7 - 10)    VITALS & I/Os:  Vital Signs Last 24 Hrs  T(C): 36.8 (02 Sep 2018 20:07), Max: 37.2 (02 Sep 2018 17:14)  T(F): 98.2 (02 Sep 2018 20:07), Max: 98.9 (02 Sep 2018 17:14)  HR: 104 (02 Sep 2018 20:07) (100 - 110)  BP: 151/92 (02 Sep 2018 20:07) (120/74 - 151/92)  BP(mean): --  RR: 18 (02 Sep 2018 20:07) (18 - 20)  SpO2: 99% (02 Sep 2018 05:17) (99% - 99%)  CAPILLARY BLOOD GLUCOSE    I&O's Summary    01 Sep 2018 07:01  -  02 Sep 2018 07:00  --------------------------------------------------------  IN: 1335 mL / OUT: 2780 mL / NET: -1445 mL    02 Sep 2018 07:01  -  02 Sep 2018 23:12  --------------------------------------------------------  IN: 2550 mL / OUT: 1960 mL / NET: 590 mL    Constitutional: Wolof speaking only patient resting comfortably in bed in no acute distress   HEENT: EOMI/PERRL b/l  Neck: No JVD, full ROM w/o pain  Respiratory: CTAB with unlabored respirations, no accessory muscle use or conversational dyspnea   Cardiovascular: Regular rate and rhythm with no arrythmias or murmurs  Gastrointestinal: Abdomen soft, non-tender, non-distended with no rebound tenderness or guarding   Rectal: Not indicated   Neurological: GCS 15 ([id number]) with no gross sensory, motor or coordination deficits   Psychiatric: Normal mood and affect   Musculoskeletal: No joint pain, swelling or deformity with no limitation of movement      LABS:                        9.4    x     )-----------( x        ( 02 Sep 2018 01:18 )             28.5     09-02    143  |  104  |  53.0<H>  ----------------------------<  94  4.4   |  26.0  |  6.39<H>    Ca    8.9      02 Sep 2018 01:18    TPro  6.1<L>  /  Alb  3.3  /  TBili  0.3<L>  /  DBili  x   /  AST  8   /  ALT  9   /  AlkPhos  60  09-01    Lactate: acetaminophen   Tablet. 650 milliGRAM(s) Oral every 6 hours PRN  acetaminophen 325 mG/butalbital 50 mG/caffeine 40 mG 1 Tablet(s) Oral every 6 hours PRN  amLODIPine   Tablet 10 milliGRAM(s) Oral daily  ferrous    sulfate 325 milliGRAM(s) Oral daily  influenza   Vaccine 0.5 milliLiter(s) IntraMuscular once  oxyCODONE    5 mG/acetaminophen 325 mG 2 Tablet(s) Oral every 6 hours PRN  sodium bicarbonate  Infusion 0.183 mEq/kG/Hr IV Continuous <Continuous>     IMAGING:      ASSESSMENT & PLAN    33 year old female requiring permacath placement     Discussed with Dr. Edward

## 2018-09-02 NOTE — PROGRESS NOTE ADULT - SUBJECTIVE AND OBJECTIVE BOX
[facility] DIVISION OF KIDNEY DISEASES AND HYPERTENSION -- FOLLOW UP NOTE  --------------------------------------------------------------------------------  Chief Complaint:  CKD V    24 hour events/subjective:  Pt seen and examined this AM  S/p renal biopsy with hematoma;   s/p 2 units PRBC ;   Hgb back up at 9  Renal function with no improvement;     PAST HISTORY  --------------------------------------------------------------------------------  No significant changes to PMH, PSH, FHx, SHx, unless otherwise noted    ALLERGIES & MEDICATIONS  --------------------------------------------------------------------------------  Allergies    No Known Allergies    Intolerances      Standing Inpatient Medications  amLODIPine   Tablet 10 milliGRAM(s) Oral daily  ferrous    sulfate 325 milliGRAM(s) Oral daily  influenza   Vaccine 0.5 milliLiter(s) IntraMuscular once  sodium bicarbonate  Infusion 0.183 mEq/kG/Hr IV Continuous <Continuous>    PRN Inpatient Medications  acetaminophen   Tablet. 650 milliGRAM(s) Oral every 6 hours PRN  acetaminophen 325 mG/butalbital 50 mG/caffeine 40 mG 1 Tablet(s) Oral every 6 hours PRN  oxyCODONE    5 mG/acetaminophen 325 mG 2 Tablet(s) Oral every 6 hours PRN      REVIEW OF SYSTEMS  --------------------------------------------------------------------------------  Gen: No weight changes, fatigue, fevers/chills, weakness  Skin: No rashes  Head/Eyes/Ears/Mouth: No headache; Normal hearing; Normal vision w/o blurriness; No sinus pain/discomfort, sore throat  Respiratory: No dyspnea, cough, wheezing, hemoptysis  CV: No chest pain, PND, orthopnea  GI: No abdominal pain, diarrhea, constipation, nausea, vomiting, melena, hematochezia  : No increased frequency, dysuria, hematuria, nocturia  MSK: No joint pain/swelling; no back pain; no edema  Neuro: No dizziness/lightheadedness, weakness, seizures, numbness, tingling  Heme: No easy bruising or bleeding  Endo: No heat/cold intolerance  Psych: No significant nervousness, anxiety, stress, depression    All other systems were reviewed and are negative, except as noted.    VITALS/PHYSICAL EXAM  --------------------------------------------------------------------------------  T(C): 36.7 (09-02-18 @ 11:33), Max: 37.1 (09-01-18 @ 18:27)  HR: 104 (09-02-18 @ 11:33) (101 - 111)  BP: 124/76 (09-02-18 @ 11:33) (120/74 - 151/94)  RR: 20 (09-02-18 @ 11:33) (18 - 20)  SpO2: 99% (09-02-18 @ 05:17) (98% - 100%)  Wt(kg): --        09-01-18 @ 07:01  -  09-02-18 @ 07:00  --------------------------------------------------------  IN: 1335 mL / OUT: 2780 mL / NET: -1445 mL      Physical Exam:  	Gen: NAD, well-appearing  	HEENT: PERRL, supple neck, clear oropharynx  	Pulm: CTA B/L  	CV: RRR, S1S2; no rub  	Back: No spinal or CVA tenderness; no sacral edema  	Abd: +BS, soft, nontender/nondistended  	: No suprapubic tenderness  	UE: Warm, FROM, no clubbing, intact strength; no edema; no asterixis  	LE: Warm, FROM, no clubbing, intact strength; no edema  	Neuro: No focal deficits, intact gait  	Psych: Normal affect and mood  	Skin: Warm, without rashes  	Vascular access:    LABS/STUDIES  --------------------------------------------------------------------------------              9.4    x     >-----------<  x        [09-02-18 @ 01:18]              28.5     143  |  104  |  53.0  ----------------------------<  94      [09-02-18 @ 01:18]  4.4   |  26.0  |  6.39        Ca     8.9     [09-02-18 @ 01:18]    TPro  6.1  /  Alb  3.3  /  TBili  0.3  /  DBili  x   /  AST  8   /  ALT  9   /  AlkPhos  60  [09-01-18 @ 08:57]              [08-31-18 @ 16:28]    Creatinine Trend:  SCr 6.39 [09-02 @ 01:18]  SCr 6.31 [09-01 @ 08:57]  SCr 5.85 [08-31 @ 16:28]  SCr 5.62 [08-31 @ 06:22]  SCr 6.05 [08-30 @ 22:42]    Urinalysis - [08-31-18 @ 02:25]      Color Yellow / Appearance Clear / SG 1.005 / pH 7.0      Gluc Negative / Ketone Negative  / Bili Negative / Urobili Negative       Blood Moderate / Protein 100 / Leuk Est Negative / Nitrite Negative      RBC 6-10 / WBC 0-2 / Hyaline  / Gran  / Sq Epi  / Non Sq Epi Moderate / Bacteria Few    Urine Creatinine 19      [08-31-18 @ 02:26]  Urine Protein 142.0      [08-31-18 @ 02:26]  Urine Sodium 49      [08-31-18 @ 02:24]  Urine Chloride 41      [08-31-18 @ 02:24]  Urine Osmolality 175      [08-31-18 @ 02:25]    Iron 45, TIBC 209, %sat 19      [08-31-18 @ 06:22]  Ferritin 58      [08-31-18 @ 06:22]  PTH -- (Ca 8.8)      [09-01-18 @ 19:52]   181  HbA1c 5.2      [08-31-18 @ 06:22]  TSH 2.15      [08-31-18 @ 06:22]  Lipid: chol 97, TG 66, HDL 39, LDL 45      [08-31-18 @ 06:22]    HBsAb Nonreact      [09-01-18 @ 20:51]  HBsAg Nonreact      [08-31-18 @ 18:18]  HBcAb Nonreact      [08-31-18 @ 18:18]  HCV 0.20, Nonreact      [08-31-18 @ 18:18]    C3 Complement 73      [09-01-18 @ 20:12]  C4 Complement 17      [09-01-18 @ 19:52]  Syphilis Screen (Treponema Pallidum Ab) Negative      [09-01-18 @ 20:54]  Free Light Chains: kappa 13.37, lambda 15.70, ratio = 0.85      [09-01 @ 19:52]

## 2018-09-02 NOTE — PROGRESS NOTE ADULT - ATTENDING COMMENTS
Patient seen and examined at the bedside. Agree with the above history, physical, assessment, and plan with the necessary amendments/elaborations below:    clinically stable. h/h responded well to transfusions x2 unit, done w/o any complication. cont to watch hgb inpatient and transfuse prn. epo per renal    regarding renal failure, stable, no significant improvement despite improvement in volume status after blood transfusion. per renal will likely need HD during this admission, plan to establish access per renal and vascular. will need to follow up remainder of lab w/u as well to formally eval for autoimmune vs vasculitis.

## 2018-09-02 NOTE — PROVIDER CONTACT NOTE (OTHER) - ASSESSMENT
On CM pt . Upon assessment pt observed praying with family in room.  Not in acute distress.   /72, Apical , RR20, O2sat 98 RA.  Pt stated I went to bathroom and came back.

## 2018-09-02 NOTE — PROGRESS NOTE ADULT - ASSESSMENT
1) ATN  2) HTN - CKD related  3) Migraines  4) Acidosis    Awaiting results of renal biopsy  Awaiting serological workup   Not frankly uremic; no emergent need for dialysis  Dr. Sissy Edward vascular surgery consult for tunneled CVC+  Will hold off on AVF until biopsy preliminary report back on Tues or Wed ; will speak with Dr. Dominga TAVARES to remove hensley  check iPTH to check for chronicity  Nephrotic range proteinuria; 142/19 = 7.47gm  Discussed with resident team this AM 1) ATN  2) HTN - CKD related  3) Migraines  4) Acidosis    Awaiting results of renal biopsy  Awaiting serological workup   Not frankly uremic; no emergent need for dialysis  Dr. Sissy Edward vascular surgery consult for tunneled CVC+  Will hold off on AVF until biopsy preliminary report back on Tues or Wed ; will speak with Dr. Dominga TAVARES to remove hensley  iPTH 182 suggestive of chronicity of renal disease  Nephrotic range proteinuria; 142/19 = 7.47gm  Discussed with resident team this AM

## 2018-09-03 LAB
ANION GAP SERPL CALC-SCNC: 14 MMOL/L — SIGNIFICANT CHANGE UP (ref 5–17)
BUN SERPL-MCNC: 49 MG/DL — HIGH (ref 8–20)
CALCIUM SERPL-MCNC: 8.8 MG/DL — SIGNIFICANT CHANGE UP (ref 8.6–10.2)
CHLORIDE SERPL-SCNC: 99 MMOL/L — SIGNIFICANT CHANGE UP (ref 98–107)
CO2 SERPL-SCNC: 27 MMOL/L — SIGNIFICANT CHANGE UP (ref 22–29)
CREAT SERPL-MCNC: 6.33 MG/DL — HIGH (ref 0.5–1.3)
GLUCOSE SERPL-MCNC: 87 MG/DL — SIGNIFICANT CHANGE UP (ref 70–115)
HCT VFR BLD CALC: 28.7 % — LOW (ref 37–47)
HGB BLD-MCNC: 9.6 G/DL — LOW (ref 12–16)
MAGNESIUM SERPL-MCNC: 1.8 MG/DL — SIGNIFICANT CHANGE UP (ref 1.6–2.6)
MCHC RBC-ENTMCNC: 28.3 PG — SIGNIFICANT CHANGE UP (ref 27–31)
MCHC RBC-ENTMCNC: 33.4 G/DL — SIGNIFICANT CHANGE UP (ref 32–36)
MCV RBC AUTO: 84.7 FL — SIGNIFICANT CHANGE UP (ref 81–99)
PHOSPHATE SERPL-MCNC: 4.4 MG/DL — SIGNIFICANT CHANGE UP (ref 2.4–4.7)
PLATELET # BLD AUTO: 109 K/UL — LOW (ref 150–400)
POTASSIUM SERPL-MCNC: 4.6 MMOL/L — SIGNIFICANT CHANGE UP (ref 3.5–5.3)
POTASSIUM SERPL-SCNC: 4.6 MMOL/L — SIGNIFICANT CHANGE UP (ref 3.5–5.3)
RBC # BLD: 3.39 M/UL — LOW (ref 4.4–5.2)
RBC # FLD: 13.9 % — SIGNIFICANT CHANGE UP (ref 11–15.6)
SODIUM SERPL-SCNC: 140 MMOL/L — SIGNIFICANT CHANGE UP (ref 135–145)
VIT D25+D1,25 OH+D1,25 PNL SERPL-MCNC: 10.4 PG/ML — LOW (ref 19.9–79.3)
WBC # BLD: 4.8 K/UL — SIGNIFICANT CHANGE UP (ref 4.8–10.8)
WBC # FLD AUTO: 4.8 K/UL — SIGNIFICANT CHANGE UP (ref 4.8–10.8)

## 2018-09-03 PROCEDURE — 99233 SBSQ HOSP IP/OBS HIGH 50: CPT

## 2018-09-03 PROCEDURE — 99233 SBSQ HOSP IP/OBS HIGH 50: CPT | Mod: GC

## 2018-09-03 PROCEDURE — 93010 ELECTROCARDIOGRAM REPORT: CPT

## 2018-09-03 PROCEDURE — 99497 ADVNCD CARE PLAN 30 MIN: CPT | Mod: GC

## 2018-09-03 RX ORDER — SENNA PLUS 8.6 MG/1
2 TABLET ORAL AT BEDTIME
Qty: 0 | Refills: 0 | Status: DISCONTINUED | OUTPATIENT
Start: 2018-09-03 | End: 2018-09-04

## 2018-09-03 RX ORDER — POLYETHYLENE GLYCOL 3350 17 G/17G
17 POWDER, FOR SOLUTION ORAL DAILY
Qty: 0 | Refills: 0 | Status: DISCONTINUED | OUTPATIENT
Start: 2018-09-03 | End: 2018-09-04

## 2018-09-03 RX ORDER — DOCUSATE SODIUM 100 MG
100 CAPSULE ORAL
Qty: 0 | Refills: 0 | Status: DISCONTINUED | OUTPATIENT
Start: 2018-09-03 | End: 2018-09-04

## 2018-09-03 RX ORDER — CALCITRIOL 0.5 UG/1
0.5 CAPSULE ORAL DAILY
Qty: 0 | Refills: 0 | Status: DISCONTINUED | OUTPATIENT
Start: 2018-09-03 | End: 2018-09-04

## 2018-09-03 RX ADMIN — Medication 125 MEQ/KG/HR: at 01:03

## 2018-09-03 RX ADMIN — Medication 100 MILLIGRAM(S): at 17:22

## 2018-09-03 RX ADMIN — Medication 325 MILLIGRAM(S): at 11:26

## 2018-09-03 RX ADMIN — CALCITRIOL 0.5 MICROGRAM(S): 0.5 CAPSULE ORAL at 11:26

## 2018-09-03 RX ADMIN — AMLODIPINE BESYLATE 10 MILLIGRAM(S): 2.5 TABLET ORAL at 05:08

## 2018-09-03 NOTE — PROGRESS NOTE ADULT - ASSESSMENT
34 yo F h/o HTN p/w acute renal failure consulted for permacath placement    Plan:  Tentative permacath placement tomorrow 9/4.  To be preopped 32 yo F h/o HTN p/w acute renal failure consulted for permacath placement    Plan:  Tentative permacath placement tomorrow 9/4.  To be preopped once patient feels amenable to plan.  Will discuss with Dr. Edward

## 2018-09-03 NOTE — PROGRESS NOTE ADULT - SUBJECTIVE AND OBJECTIVE BOX
[facility] DIVISION OF KIDNEY DISEASES AND HYPERTENSION -- FOLLOW UP NOTE  --------------------------------------------------------------------------------  Chief Complaint: CKD V    24 hour events/subjective:  Pt seen and examined  Pt still not accepting of her renal failure;  Awaiting renal biopsy  Refusing to consent for HD today; wants to wait another day      PAST HISTORY  --------------------------------------------------------------------------------  No significant changes to PMH, PSH, FHx, SHx, unless otherwise noted    ALLERGIES & MEDICATIONS  --------------------------------------------------------------------------------  Allergies    No Known Allergies    Intolerances      Standing Inpatient Medications  amLODIPine   Tablet 10 milliGRAM(s) Oral daily  calcitriol   Capsule 0.5 MICROGram(s) Oral daily  docusate sodium 100 milliGRAM(s) Oral two times a day  ferrous    sulfate 325 milliGRAM(s) Oral daily  influenza   Vaccine 0.5 milliLiter(s) IntraMuscular once  senna 2 Tablet(s) Oral at bedtime  sodium bicarbonate  Infusion 0.183 mEq/kG/Hr IV Continuous <Continuous>    PRN Inpatient Medications  acetaminophen   Tablet. 650 milliGRAM(s) Oral every 6 hours PRN  acetaminophen 325 mG/butalbital 50 mG/caffeine 40 mG 1 Tablet(s) Oral every 6 hours PRN  oxyCODONE    5 mG/acetaminophen 325 mG 2 Tablet(s) Oral every 6 hours PRN  polyethylene glycol 3350 17 Gram(s) Oral daily PRN      REVIEW OF SYSTEMS  --------------------------------------------------------------------------------  Gen: No weight changes, fatigue, fevers/chills, weakness  Skin: No rashes  Head/Eyes/Ears/Mouth: No headache; Normal hearing; Normal vision w/o blurriness; No sinus pain/discomfort, sore throat  Respiratory: No dyspnea, cough, wheezing, hemoptysis  CV: No chest pain, PND, orthopnea  GI: No abdominal pain, diarrhea, constipation, nausea, vomiting, melena, hematochezia  : No increased frequency, dysuria, hematuria, nocturia  MSK: No joint pain/swelling; no back pain; no edema  Neuro: No dizziness/lightheadedness, weakness, seizures, numbness, tingling  Heme: No easy bruising or bleeding  Endo: No heat/cold intolerance  Psych: No significant nervousness, anxiety, stress, depression    All other systems were reviewed and are negative, except as noted.    VITALS/PHYSICAL EXAM  --------------------------------------------------------------------------------  T(C): 36.4 (09-03-18 @ 12:18), Max: 37.2 (09-02-18 @ 17:14)  HR: 94 (09-03-18 @ 12:18) (77 - 104)  BP: 143/94 (09-03-18 @ 12:18) (130/84 - 151/92)  RR: 18 (09-03-18 @ 12:18) (17 - 19)  SpO2: 98% (09-03-18 @ 05:10) (98% - 98%)  Wt(kg): --        09-02-18 @ 07:01  -  09-03-18 @ 07:00  --------------------------------------------------------  IN: 2550 mL / OUT: 1962 mL / NET: 588 mL    09-03-18 @ 07:01  -  09-03-18 @ 15:58  --------------------------------------------------------  IN: 1125 mL / OUT: 860 mL / NET: 265 mL      Physical Exam:  	Gen: NAD, well-appearing  	HEENT: PERRL, supple neck, clear oropharynx  	Pulm: CTA B/L  	CV: RRR, S1S2; no rub  	Back: No spinal or CVA tenderness; no sacral edema  	Abd: +BS, soft, nontender/nondistended  	: No suprapubic tenderness  	UE: Warm, FROM, no clubbing, intact strength; no edema; no asterixis  	LE: Warm, FROM, no clubbing, intact strength; no edema  	Neuro: No focal deficits, intact gait  	Psych: Normal affect and mood  	Skin: Warm, without rashes    LABS/STUDIES  --------------------------------------------------------------------------------              9.6    4.8   >-----------<  109      [09-03-18 @ 07:27]              28.7     140  |  99  |  49.0  ----------------------------<  87      [09-03-18 @ 07:27]  4.6   |  27.0  |  6.33        Ca     8.8     [09-03-18 @ 07:27]      Mg     1.8     [09-03-18 @ 07:27]      Phos  4.4     [09-03-18 @ 07:27]            Creatinine Trend:  SCr 6.33 [09-03 @ 07:27]  SCr 6.39 [09-02 @ 01:18]  SCr 6.31 [09-01 @ 08:57]  SCr 5.85 [08-31 @ 16:28]  SCr 5.62 [08-31 @ 06:22]    Urinalysis - [08-31-18 @ 02:25]      Color Yellow / Appearance Clear / SG 1.005 / pH 7.0      Gluc Negative / Ketone Negative  / Bili Negative / Urobili Negative       Blood Moderate / Protein 100 / Leuk Est Negative / Nitrite Negative      RBC 6-10 / WBC 0-2 / Hyaline  / Gran  / Sq Epi  / Non Sq Epi Moderate / Bacteria Few    Urine Creatinine 19      [08-31-18 @ 02:26]  Urine Protein 142.0      [08-31-18 @ 02:26]  Urine Sodium 49      [08-31-18 @ 02:24]  Urine Chloride 41      [08-31-18 @ 02:24]  Urine Osmolality 175      [08-31-18 @ 02:25]    Iron 45, TIBC 209, %sat 19      [08-31-18 @ 06:22]  Ferritin 58      [08-31-18 @ 06:22]  PTH -- (Ca 8.8)      [09-02-18 @ 14:18]   206  PTH -- (Ca 8.8)      [09-01-18 @ 19:52]   181  HbA1c 5.2      [08-31-18 @ 06:22]  TSH 2.15      [08-31-18 @ 06:22]  Lipid: chol 97, TG 66, HDL 39, LDL 45      [08-31-18 @ 06:22]    HBsAb Nonreact      [09-01-18 @ 20:51]  HBsAg Nonreact      [08-31-18 @ 18:18]  HBcAb Nonreact      [08-31-18 @ 18:18]  HCV 0.20, Nonreact      [08-31-18 @ 18:18]    C3 Complement 73      [09-01-18 @ 20:12]  C4 Complement 17      [09-01-18 @ 19:52]  Syphilis Screen (Treponema Pallidum Ab) Negative      [09-01-18 @ 20:54]  Free Light Chains: kappa 13.37, lambda 15.70, ratio = 0.85      [09-01 @ 19:52] [facility] DIVISION OF KIDNEY DISEASES AND HYPERTENSION -- FOLLOW UP NOTE  --------------------------------------------------------------------------------  Chief Complaint: CKD V    24 hour events/subjective:  Pt seen and examined  Pt still not accepting of her renal failure;  Awaiting renal biopsy results  Refusing to consent for HD today; wants to wait another day      PAST HISTORY  --------------------------------------------------------------------------------  No significant changes to PMH, PSH, FHx, SHx, unless otherwise noted    ALLERGIES & MEDICATIONS  --------------------------------------------------------------------------------  Allergies    No Known Allergies    Intolerances      Standing Inpatient Medications  amLODIPine   Tablet 10 milliGRAM(s) Oral daily  calcitriol   Capsule 0.5 MICROGram(s) Oral daily  docusate sodium 100 milliGRAM(s) Oral two times a day  ferrous    sulfate 325 milliGRAM(s) Oral daily  influenza   Vaccine 0.5 milliLiter(s) IntraMuscular once  senna 2 Tablet(s) Oral at bedtime  sodium bicarbonate  Infusion 0.183 mEq/kG/Hr IV Continuous <Continuous>    PRN Inpatient Medications  acetaminophen   Tablet. 650 milliGRAM(s) Oral every 6 hours PRN  acetaminophen 325 mG/butalbital 50 mG/caffeine 40 mG 1 Tablet(s) Oral every 6 hours PRN  oxyCODONE    5 mG/acetaminophen 325 mG 2 Tablet(s) Oral every 6 hours PRN  polyethylene glycol 3350 17 Gram(s) Oral daily PRN      REVIEW OF SYSTEMS  --------------------------------------------------------------------------------  Gen: No weight changes, fatigue, fevers/chills, weakness  Skin: No rashes  Head/Eyes/Ears/Mouth: No headache; Normal hearing; Normal vision w/o blurriness; No sinus pain/discomfort, sore throat  Respiratory: No dyspnea, cough, wheezing, hemoptysis  CV: No chest pain, PND, orthopnea  GI: No abdominal pain, diarrhea, constipation, nausea, vomiting, melena, hematochezia  : No increased frequency, dysuria, hematuria, nocturia  MSK: No joint pain/swelling; no back pain; no edema  Neuro: No dizziness/lightheadedness, weakness, seizures, numbness, tingling  Heme: No easy bruising or bleeding  Endo: No heat/cold intolerance  Psych: No significant nervousness, anxiety, stress, depression    All other systems were reviewed and are negative, except as noted.    VITALS/PHYSICAL EXAM  --------------------------------------------------------------------------------  T(C): 36.4 (09-03-18 @ 12:18), Max: 37.2 (09-02-18 @ 17:14)  HR: 94 (09-03-18 @ 12:18) (77 - 104)  BP: 143/94 (09-03-18 @ 12:18) (130/84 - 151/92)  RR: 18 (09-03-18 @ 12:18) (17 - 19)  SpO2: 98% (09-03-18 @ 05:10) (98% - 98%)  Wt(kg): --        09-02-18 @ 07:01  -  09-03-18 @ 07:00  --------------------------------------------------------  IN: 2550 mL / OUT: 1962 mL / NET: 588 mL    09-03-18 @ 07:01  -  09-03-18 @ 15:58  --------------------------------------------------------  IN: 1125 mL / OUT: 860 mL / NET: 265 mL      Physical Exam:  	Gen: NAD, well-appearing  	HEENT: PERRL, supple neck, clear oropharynx  	Pulm: CTA B/L  	CV: RRR, S1S2; no rub  	Back: No spinal or CVA tenderness; no sacral edema  	Abd: +BS, soft, nontender/nondistended  	: No suprapubic tenderness  	UE: Warm, FROM, no clubbing, intact strength; no edema; no asterixis  	LE: Warm, FROM, no clubbing, intact strength; no edema  	Neuro: No focal deficits, intact gait  	Psych: Normal affect and mood  	Skin: Warm, without rashes    LABS/STUDIES  --------------------------------------------------------------------------------              9.6    4.8   >-----------<  109      [09-03-18 @ 07:27]              28.7     140  |  99  |  49.0  ----------------------------<  87      [09-03-18 @ 07:27]  4.6   |  27.0  |  6.33        Ca     8.8     [09-03-18 @ 07:27]      Mg     1.8     [09-03-18 @ 07:27]      Phos  4.4     [09-03-18 @ 07:27]            Creatinine Trend:  SCr 6.33 [09-03 @ 07:27]  SCr 6.39 [09-02 @ 01:18]  SCr 6.31 [09-01 @ 08:57]  SCr 5.85 [08-31 @ 16:28]  SCr 5.62 [08-31 @ 06:22]    Urinalysis - [08-31-18 @ 02:25]      Color Yellow / Appearance Clear / SG 1.005 / pH 7.0      Gluc Negative / Ketone Negative  / Bili Negative / Urobili Negative       Blood Moderate / Protein 100 / Leuk Est Negative / Nitrite Negative      RBC 6-10 / WBC 0-2 / Hyaline  / Gran  / Sq Epi  / Non Sq Epi Moderate / Bacteria Few    Urine Creatinine 19      [08-31-18 @ 02:26]  Urine Protein 142.0      [08-31-18 @ 02:26]  Urine Sodium 49      [08-31-18 @ 02:24]  Urine Chloride 41      [08-31-18 @ 02:24]  Urine Osmolality 175      [08-31-18 @ 02:25]    Iron 45, TIBC 209, %sat 19      [08-31-18 @ 06:22]  Ferritin 58      [08-31-18 @ 06:22]  PTH -- (Ca 8.8)      [09-02-18 @ 14:18]   206  PTH -- (Ca 8.8)      [09-01-18 @ 19:52]   181  HbA1c 5.2      [08-31-18 @ 06:22]  TSH 2.15      [08-31-18 @ 06:22]  Lipid: chol 97, TG 66, HDL 39, LDL 45      [08-31-18 @ 06:22]    HBsAb Nonreact      [09-01-18 @ 20:51]  HBsAg Nonreact      [08-31-18 @ 18:18]  HBcAb Nonreact      [08-31-18 @ 18:18]  HCV 0.20, Nonreact      [08-31-18 @ 18:18]    C3 Complement 73      [09-01-18 @ 20:12]  C4 Complement 17      [09-01-18 @ 19:52]  Syphilis Screen (Treponema Pallidum Ab) Negative      [09-01-18 @ 20:54]  Free Light Chains: kappa 13.37, lambda 15.70, ratio = 0.85      [09-01 @ 19:52]

## 2018-09-03 NOTE — PROGRESS NOTE ADULT - SUBJECTIVE AND OBJECTIVE BOX
cc: Patient is a 33y old  Female who presents with a chief complaint of Kidney failure (31 Aug 2018 01:19)    -->Followed by   -Nephro: Dr. Littlejohn  -Vascular surgery: Dr Sheehan- [place]    INTERVAL HPI/OVERNIGHT EVENTS:  Patient seen and examined at beside. As per nurse, patient presented tachycardia and low hemoglobin in night ours that needed transfusion of 2 units of pRBCs, w/o complications after transfusion. Patient presented pain on bx incision and headache during the night that alleviated with pain medication w/o subsequent episodes. Patient refers feeling better after transfusion, less tired and no more palpitations. Patient last BM 2 days ago, and is Mcleod is actively draining clear urine.  Patient states that she has hope for her kidney function will improve despite explaining in several opportunities that she will probably need HD. Patient denies nausea/Vomiting, fever, calf pain, SOB, diarrhea. Rest of ROS negative except for above.    Allergies: No Known Allergies    I&O's Summary    01 Sep 2018 07:  -  02 Sep 2018 07:00  --------------------------------------------------------  IN: 1335 mL / OUT: 2780 mL / NET: -1445 mL    02 Sep 2018 07:  -  02 Sep 2018 15:54  --------------------------------------------------------  IN: 0 mL / OUT: 1470 mL / NET: -1470 mL      Vital Signs Last 24 Hrs  Vital Signs Last 24 Hrs  T(C): 36.8 (02 Sep 2018 05:17), Max: 37.1 (01 Sep 2018 07:24)  T(F): 98.3 (02 Sep 2018 05:17), Max: 98.7 (01 Sep 2018 07:24)  HR: 110 (02 Sep 2018 05:17) (87 - 112)  BP: 120/74 (02 Sep 2018 05:17) (113/70 - 151/94)  RR: 20 (02 Sep 2018 05:17) (18 - 20)  SpO2: 99% (02 Sep 2018 05:17) (98% - 100%)      Physical Exam:   GENERAL: NAD, well-groomed, well-developed. Cooperative. Afebrile, eupneic.  SKIN: Decreased pallor compared to admission. There was mild palpebral edema w/o limitation to eye movements.   NECK: Supple, No JVD, Normal thyroid  NERVOUS SYSTEM:  Alert & Oriented X3, Good concentration; No focal signs.   RESP: Clear to auscultation bilaterally; No rales, rhonchi, wheezing, or rubs.  CVS: Regular rate and rhythm; No murmurs, rubs, or gallops. Capillary refill <2s  GI: On the back incision is noted with clean/dry dressing, no active exudate/bleeding, no hematoma was observed. Bowel sounds are present. Abdomen in soft and non tender to palpation.   : Mcleod catheter in place, draining clear urine.  EXTREMITIES:  2+ Peripheral Pulses. Mild edema on hands. No clubbing.    LYMPH: No cervical or supraclavicular lymphadenopathy noted    LABS:               9.4    x     )-----------( x        ( 02 Sep 2018 01:18 )             28.5   09-    143  |  104  |  53.0<H>  ----------------------------<  94  4.4   |  26.0  |  6.39<H>    Ca    8.9      02 Sep 2018 01:18  Phos  4.5     08-31  Mg     2.3     08-31    TPro  6.1<L>  /  Alb  3.3  /  TBili  0.3<L>  /  DBili  x   /  AST  8   /  ALT  9   /  AlkPhos  60      MIRIAM Kappa: 13.37 mg/dL (18 @ 19:52)  MIRIAM Lambda: 15.70 mg/dL (18 @ 19:52)    Intact PTH: 181: PTH METHOD: Roche  Calcium, Total Serum: 8.9 mg/dL (18 @ 01:18)             ______________________________________________________________________                      6.7    3.9   )-----------( 147      ( 01 Sep 2018 07:46 )             20.7     CBC Full  -  ( 01 Sep 2018 07:46 )  WBC Count : 3.9 K/uL  Hemoglobin : 6.7 g/dL  Hematocrit : 20.7 %  Platelet Count - Automated : 147 K/uL  Mean Cell Volume : 84.1 fl  Mean Cell Hemoglobin : 27.2 pg  Mean Cell Hemoglobin Concentration : 32.4 g/dL      144  |  109<H>  |  62.0<H>  ----------------------------<  81  4.9   |  18.0<L>  |  5.85<H>    Ca    9.0      31 Aug 2018 16:28  Phos  4.5       Mg     2.3         TPro  7.5  /  Alb  4.1  /  TBili  0.2<L>  /  DBili  x   /  AST  9   /  ALT  12  /  AlkPhos  75      PT/INR - ( 30 Aug 2018 22:42 )   PT: 11.6 sec;   INR: 1.05 ratio    PTT - ( 30 Aug 2018 22:42 )  PTT:31.6 sec    Urinalysis Basic - ( 31 Aug 2018 02:25 )  Color: Yellow / Appearance: Clear / S.005 / pH: x  Gluc: x / Ketone: Negative  / Bili: Negative / Urobili: Negative mg/dL   Blood: x / Protein: 100 mg/dL / Nitrite: Negative   Leuk Esterase: Negative / RBC: 6-10 /HPF / WBC 0-2   Sq Epi: x / Non Sq Epi: Moderate / Bacteria: Few    Hemoglobin A1C, Whole Blood: 5.2 % ( @ 06:22)    Serum Pro-Brain Natriuretic Peptide: 1983 pg/mL ( @ 06:22)  Serum Pro-Brain Natriuretic Peptide: 1712 pg/mL ( @ 22:42)  ___________________________________________________________________________________  US Duplex Kidneys (18 @ 14:51) >  IMPRESSION:     No evidence of a significant renal artery stenosis.    Echogenic kidneys compatible with medical renal disease.    Right renal perinephric collection compatible with the known right   perinephrichematoma.    < end of copied text >  _____________________________________________________________________________________    MEDICATIONS  (STANDING):  amLODIPine   Tablet 10 milliGRAM(s) Oral daily  ferrous    sulfate 325 milliGRAM(s) Oral daily  sodium bicarbonate  Infusion 0.183 mEq/kG/Hr (125 mL/Hr) IV Continuous <Continuous>    MEDICATIONS  (PRN):  acetaminophen   Tablet. 650 milliGRAM(s) Oral every 6 hours PRN Moderate Pain (4 - 6)  acetaminophen 325 mG/butalbital 50 mG/caffeine 40 mG 1 Tablet(s) Oral every 6 hours PRN headache  oxyCODONE    5 mG/acetaminophen 325 mG 2 Tablet(s) Oral every 6 hours PRN Severe Pain (7 - 10) cc: Patient is a 33y old  Female who presents with a chief complaint of Kidney failure (31 Aug 2018 01:19)    Followed by:   -Nephro: Dr. Littlejohn  -Vascular surgery: Dr Sheehan- [place]    INTERVAL HPI/OVERNIGHT EVENTS:  Patient seen and examined at beside. As per nurse, there were no acute events over night. Patient refers feeling well, she is tolerating PO w/o nausea/vomiting, she is actively voiding clear urine w/o hematuria dysuria. Patient last BM 3 days ago, and is Mcleod is actively draining clear urine. Patient denies nausea/Vomiting, fever, calf pain, SOB, diarrhea. Rest of ROS negative except for above.    --> Patient states her intention to wait until results of biopsy to decide if she wants to go to HD or accept the permacath. Patient states in several oportunities that she is waiting for a miracle from God and that she thinks it was de biopsy and anemia that cause her kidney to fail. It was explained to the patient that her anemia was a result of the kidney failure which is chronic. Also,  that her kidney function has not improved since her admission despite hydration and that it will most likely require HD to prevent complications. Patient remained not amenable to catheter placement and HD.    Allergies: No Known Allergies    I&O's Summary     02 Sep 2018 07:  -  03 Sep 2018 07:00  --------------------------------------------------------  IN: 2550 mL / OUT: 1962 mL / NET: 588 mL    03 Sep 2018 07:01  -  03 Sep 2018 16:15  --------------------------------------------------------  IN: 1125 mL / OUT: 860 mL / NET: 265 mL -->Pt has been voiding in toilet and urine output has not been measured.       Vital Signs Last 24 Hrs  T(C): 36.4 (03 Sep 2018 12:18), Max: 37.2 (02 Sep 2018 17:14)  T(F): 97.5 (03 Sep 2018 12:18), Max: 98.9 (02 Sep 2018 17:14)  HR: 94 (03 Sep 2018 12:18) (77 - 104)  BP: 143/94 (03 Sep 2018 12:18) (130/84 - 151/92)  RR: 18 (03 Sep 2018 12:18) (17 - 19)  SpO2: 98% (03 Sep 2018 05:10) (98% - 98%)      Physical Exam:   GENERAL: NAD, well-groomed, well-developed. Cooperative. Afebrile, eupneic.  SKIN: Mild pallor. Mild palpebral edema w/o limitation to eye movements.   NECK: Supple, No JVD, Normal thyroid  NERVOUS SYSTEM:  Alert & Oriented X3, Good concentration; No focal signs.   RESP: Clear to auscultation bilaterally; No rales, rhonchi, wheezing, or rubs.  CVS: Regular rate and rhythm. No murmurs, rubs, or gallops. Capillary refill <2s  GI: On the back: incision is noted with clean/dry dressing, no active exudate/bleeding, no hematoma was observed. Bowel sounds are present. Abdomen in soft and non tender to palpation.   EXTREMITIES:  2+ Peripheral Pulses. Mild edema on hands. No clubbing.    LYMPH: No cervical or supraclavicular lymphadenopathy noted    LABS:    COMPLETE BLOOD COUNT  __________________________________________________________________________________                          9.6    4.8   )-----------( 109      ( 03 Sep 2018 07:27 )             28.7                  9.4    x     )-----------( x        ( 02 Sep 2018 01:18 )             28.5       CHEM  ___________________________________________________________________________________          140  |  99  |  49.0<H>  ----------------------------<  87  4.6   |  27.0  |  6.33<H>    Ca    8.8      03 Sep 2018 07:27  Phos  4.4     -  Mg     1.8         143  |  104  |  53.0<H>  ----------------------------<  94  4.4   |  26.0  |  6.39<H>    Ca    8.9      02 Sep 2018 01:18  Phos  4.5       Mg     2.3         TPro  6.1<L>  /  Alb  3.3  /  TBili  0.3<L>  /  DBili  x   /  AST  8   /  ALT  9   /  AlkPhos  60      MIRIAM Kappa: 13.37 mg/dL (18 @ 19:52)  MIRIAM Lambda: 15.70 mg/dL (18 @ 19:52)    Intact PTH: 181: PTH METHOD: Roche  Calcium, Total Serum: 8.9 mg/dL (18 @ 01:18)      Hemoglobin A1C, Whole Blood: 5.2 % ( @ 06:22)    Serum Pro-Brain Natriuretic Peptide: 1983 pg/mL ( @ 06:22)  Serum Pro-Brain Natriuretic Peptide: 1712 pg/mL ( @ 22:42)       URINE    ______________________________________________________________________             Urinalysis Basic - ( 31 Aug 2018 02:25 )  Color: Yellow / Appearance: Clear / S.005 / pH: x  Gluc: x / Ketone: Negative  / Bili: Negative / Urobili: Negative mg/dL   Blood: x / Protein: 100 mg/dL / Nitrite: Negative   Leuk Esterase: Negative / RBC: 6-10 /HPF / WBC 0-2   Sq Epi: x / Non Sq Epi: Moderate / Bacteria: Few    ___________________________________________________________________________________  US Duplex Kidneys (18 @ 14:51) >  IMPRESSION:     No evidence of a significant renal artery stenosis.    Echogenic kidneys compatible with medical renal disease.    Right renal perinephric collection compatible with the known right   perinephrichematoma.    < end of copied text >  _____________________________________________________________________________________    MEDICATIONS  (STANDING):  amLODIPine   Tablet 10 milliGRAM(s) Oral daily  calcitriol   Capsule 0.5 MICROGram(s) Oral daily  docusate sodium 100 milliGRAM(s) Oral two times a day  ferrous    sulfate 325 milliGRAM(s) Oral daily  influenza   Vaccine 0.5 milliLiter(s) IntraMuscular once  senna 2 Tablet(s) Oral at bedtime  sodium bicarbonate  Infusion 0.183 mEq/kG/Hr (125 mL/Hr) IV Continuous <Continuous>      MEDICATIONS  (PRN):  acetaminophen   Tablet. 650 milliGRAM(s) Oral every 6 hours PRN Moderate Pain (4 - 6)  acetaminophen 325 mG/butalbital 50 mG/caffeine 40 mG 1 Tablet(s) Oral every 6 hours PRN headache  oxyCODONE    5 mG/acetaminophen 325 mG 2 Tablet(s) Oral every 6 hours PRN Severe Pain (7 - 10)  polyethylene glycol 3350 17 Gram(s) Oral daily PRN Constipation

## 2018-09-03 NOTE — PROGRESS NOTE ADULT - ATTENDING COMMENTS
Patient seen and examined at the bedside. Agree with the above history, physical, assessment, and plan with the necessary amendments/elaborations already made above. Patient seen and examined at the bedside. Agree with the above history, physical, assessment, and plan with the necessary amendments/elaborations already made above.    Goals of Care discussed at length with the patient. This conversation included current condition, prognosis, and options for therapy. Discussed desires by patient for further care and wishes were expressed - she understands the need for HD but as she feels well she is not interested in pursuing @ this moment, she would like to hold and continue praying for a miracle/cure to come. Many of her thoughts regarding her current condition are medically inaccurate, she believes the renal bx is the reason that her renal function cannot recover stating that it must have damaged her kidneys permanently. she also states that her anemia must be secondary to blood draws in the hospital. all disparities between her understanding and true medical condition were explained at length in a way that she could comprehend until she demonstrated understanding however it appears she still maintains many of these thoughts. she is deeply Yazdanism and believes that medical treatment is not the solution, but that prayer will bring a resolution to her ailment. compassion expressed. understanding of her Yazdanism beliefs and acknowledgement expressed. we will continue to follow what her ultimate decision regarding HD is as she states she will think about it for one additional day. Family present at the bedside. Length of Conversation ~35min.

## 2018-09-03 NOTE — PROGRESS NOTE ADULT - ASSESSMENT
1) CKD V  2) HTN - CKD related  3) Migraines  4) Acidosis    Likely all hypertensive related; no improvement with fluids; PTH elevated; believe she is CKDV/ ESRD  Awaiting results of renal biopsy  Awaiting rest of serologic workup  Vascular following for tunneled CVC and eventual AVF;  Will need HD in next 24-48 hours  Will hold off on AVF until biopsy preliminary report back on Tues or Wed ; will speak with Dr. Dominga Srinivasan  iPTH 182 suggestive of chronicity of renal disease ; I started her on calcitriol this AM  Nephrotic range proteinuria; 142/19 = 7.47gm    Discussed with resident team this AM

## 2018-09-03 NOTE — PROGRESS NOTE ADULT - SUBJECTIVE AND OBJECTIVE BOX
INTERVAL HPI/OVERNIGHT EVENTS:    SUBJECTIVE:  Yesterday, pt was tachy 150s during a prayer meeting with friends.  No acute overnight events occurred, especially any SVT.  Pt resting comfortably in bed with no complaints.  No fevers/chills/sob/cp      MEDICATIONS  (STANDING):  amLODIPine   Tablet 10 milliGRAM(s) Oral daily  ferrous    sulfate 325 milliGRAM(s) Oral daily  influenza   Vaccine 0.5 milliLiter(s) IntraMuscular once  sodium bicarbonate  Infusion 0.183 mEq/kG/Hr (125 mL/Hr) IV Continuous <Continuous>    MEDICATIONS  (PRN):  acetaminophen   Tablet. 650 milliGRAM(s) Oral every 6 hours PRN Moderate Pain (4 - 6)  acetaminophen 325 mG/butalbital 50 mG/caffeine 40 mG 1 Tablet(s) Oral every 6 hours PRN headache  oxyCODONE    5 mG/acetaminophen 325 mG 2 Tablet(s) Oral every 6 hours PRN Severe Pain (7 - 10)      Vital Signs Last 24 Hrs  T(C): 37.1 (03 Sep 2018 05:10), Max: 37.2 (02 Sep 2018 17:14)  T(F): 98.8 (03 Sep 2018 05:10), Max: 98.9 (02 Sep 2018 17:14)  HR: 77 (03 Sep 2018 05:10) (77 - 104)  BP: 130/84 (03 Sep 2018 05:10) (124/76 - 151/92)  BP(mean): --  RR: 17 (03 Sep 2018 05:10) (17 - 20)  SpO2: 98% (03 Sep 2018 05:10) (98% - 98%)    PE  Gen:  NAD  Pulm: nonlabored breathing, CTAB/L  CV: S1, S2, RRR  Abd:  soft, nontender, nondistended.  no flank tenderness.  Ext: no swelling, cyanosis or clubbing  Vasc: 2+ peripheral pulses throughout  Neuro: AACx3, no motor or sensory deficits      I&O's Detail    01 Sep 2018 07:01  -  02 Sep 2018 07:00  --------------------------------------------------------  IN:    Oral Fluid: 210 mL    sodium bicarbonate  Infusion: 1125 mL  Total IN: 1335 mL    OUT:    Indwelling Catheter - Urethral: 2780 mL  Total OUT: 2780 mL    Total NET: -1445 mL      02 Sep 2018 07:01  -  03 Sep 2018 06:23  --------------------------------------------------------  IN:    Oral Fluid: 1300 mL    sodium bicarbonate  Infusion: 1250 mL  Total IN: 2550 mL    OUT:    Indwelling Catheter - Urethral: 1470 mL    Voided: 492 mL  Total OUT: 1962 mL    Total NET: 588 mL          LABS:                        9.4    x     )-----------( x        ( 02 Sep 2018 01:18 )             28.5     09-02    143  |  104  |  53.0<H>  ----------------------------<  94  4.4   |  26.0  |  6.39<H>    Ca    8.9      02 Sep 2018 01:18    TPro  6.1<L>  /  Alb  3.3  /  TBili  0.3<L>  /  DBili  x   /  AST  8   /  ALT  9   /  AlkPhos  60  09-01          RADIOLOGY & ADDITIONAL STUDIES: INTERVAL HPI/OVERNIGHT EVENTS:    SUBJECTIVE:  Yesterday, pt was tachy 150s during a prayer meeting with friends.  No acute overnight events occurred, especially any SVT.  Pt seen ambulating with no complaints.  No fevers/chills/sob/cp.  Pt seems to express some hesitation on the plan.  She expressed that she will discuss with her primary physician.      MEDICATIONS  (STANDING):  amLODIPine   Tablet 10 milliGRAM(s) Oral daily  ferrous    sulfate 325 milliGRAM(s) Oral daily  influenza   Vaccine 0.5 milliLiter(s) IntraMuscular once  sodium bicarbonate  Infusion 0.183 mEq/kG/Hr (125 mL/Hr) IV Continuous <Continuous>    MEDICATIONS  (PRN):  acetaminophen   Tablet. 650 milliGRAM(s) Oral every 6 hours PRN Moderate Pain (4 - 6)  acetaminophen 325 mG/butalbital 50 mG/caffeine 40 mG 1 Tablet(s) Oral every 6 hours PRN headache  oxyCODONE    5 mG/acetaminophen 325 mG 2 Tablet(s) Oral every 6 hours PRN Severe Pain (7 - 10)      Vital Signs Last 24 Hrs  T(C): 37.1 (03 Sep 2018 05:10), Max: 37.2 (02 Sep 2018 17:14)  T(F): 98.8 (03 Sep 2018 05:10), Max: 98.9 (02 Sep 2018 17:14)  HR: 77 (03 Sep 2018 05:10) (77 - 104)  BP: 130/84 (03 Sep 2018 05:10) (124/76 - 151/92)  BP(mean): --  RR: 17 (03 Sep 2018 05:10) (17 - 20)  SpO2: 98% (03 Sep 2018 05:10) (98% - 98%)    PE  Gen:  NAD  Pulm: nonlabored breathing, CTAB/L  CV: S1, S2, RRR  Abd:  soft, nontender, nondistended.  no flank tenderness.  Ext: no swelling, cyanosis or clubbing  Vasc: 2+ peripheral pulses throughout  Neuro: AACx3, no motor or sensory deficits      I&O's Detail    01 Sep 2018 07:01  -  02 Sep 2018 07:00  --------------------------------------------------------  IN:    Oral Fluid: 210 mL    sodium bicarbonate  Infusion: 1125 mL  Total IN: 1335 mL    OUT:    Indwelling Catheter - Urethral: 2780 mL  Total OUT: 2780 mL    Total NET: -1445 mL      02 Sep 2018 07:01  -  03 Sep 2018 06:23  --------------------------------------------------------  IN:    Oral Fluid: 1300 mL    sodium bicarbonate  Infusion: 1250 mL  Total IN: 2550 mL    OUT:    Indwelling Catheter - Urethral: 1470 mL    Voided: 492 mL  Total OUT: 1962 mL    Total NET: 588 mL          LABS:                        9.4    x     )-----------( x        ( 02 Sep 2018 01:18 )             28.5     09-02    143  |  104  |  53.0<H>  ----------------------------<  94  4.4   |  26.0  |  6.39<H>    Ca    8.9      02 Sep 2018 01:18    TPro  6.1<L>  /  Alb  3.3  /  TBili  0.3<L>  /  DBili  x   /  AST  8   /  ALT  9   /  AlkPhos  60  09-01          RADIOLOGY & ADDITIONAL STUDIES:

## 2018-09-03 NOTE — PROGRESS NOTE ADULT - ASSESSMENT
33 years old female with Hx of recently diagnosed hypertension and migraines was treated with Propanolol for two weeks and thereafter prescribed lisinopril due to intolerance of propranolol as well as incidental finding of elevated prolactin level on labs with noted cessation of menstruation since April 2018; pt was referred from [facility] due to elevated creatinine, with concern for acute vs acute on chronic kidney failure unclear etiology.     Renal US shows medical renal disease which is probably CKD. Admission was complicated with hemorrhage posterior to renal biopsy, which required transfusion of 2 units of pRBCs with subsequent improvement on Hb and hemodynamic state.    Nephrology and Vascular surgery continue following the case.    Acute on chronic kidney disease  - Sustained.  -pt asymptomatic   - BUN:Cr ratio 8.29  - BUN/cr with slight down trend, but Cr sustained on 6.   - c/w IVF   - Hyperkalemia resolved.  - c/w monitoring renal function   - Renal doppler US -- > no signs of stenosis, US showing no changes compared to admission.  - U/A with microscopy  protein: 100      - ESR elevated   -CRP wnl   - C3 mildly decreased discussed with dr. Littlejohn who recommended to hold corticosteroid therapy for now waiting for the other workup ordered.  -F/u ALBA, metanephrines, ds DNA  -F/U cmp in am  -PTH elevated -->Indicates ckd  - D/c [facility] catheter  - Hold nephrotoxic medications   - hepatitis panel negative  - Nephrology consul noted and appreciated    S/p Renal Biopsy  -complicated with perirenal hemorrhage -->resolving.  -Drop in Hb needed 2 units of pRBCs  -C/w pain control with Tylenol 650 mg q6 for mild pain and Percocet 5/350 mg q6 for moderate-severe pain.  -F/u Bx results -->Probably Tuesday/ Wednesday     Hypertension  - Stable  - Holding Lisinopril at the moment due to LAITH   - C/W Amlodipine 10 mg PO and titrate as needed.    Acute on Chronic Anemia of chronic disease   - Low TIBC, with low-normal Iron, ferritin and transferrin    - h/h decreasing secondary to biopsy hemorrhage  - Transfuse 2 units pRBC  - Follow up cbc on am   - given slightly lower ferritin could benefit from iron therapy   - Normocytic normochromic  - c/w ferrous home sulfate 325mg po qd   - epogen as per nephro     Amenorrhea  - Likely secondary to hyperprolactinemia  - Will trend LH, FSH and prolactin - labs testing   - MRI brain with no evidence of pituitary adenoma - recommends dynamic pituitary study with contrast but pt is not able to receive contrast due to LAITH.   - Will consult endocrinology     Migraine   - Tylenol is not sufficient  - Started Fiorcet PRN    DVT prophylaxis  - Given hemorrhage, hold A/C  - Pt is ambulating   - scd boots b/l     Dispo: When pt is medically stable to return to home. Based on renal functioning and if worsens need for HD may need community HD center. 33 years old female with Hx of recently diagnosed hypertension and migraines was treated with Propanolol for two weeks and thereafter prescribed lisinopril due to intolerance of propranolol as well as incidental finding of elevated prolactin level on labs with noted cessation of menstruation since April 2018; pt was referred from [facility] due to elevated creatinine, with concern for acute vs acute on chronic kidney failure unclear etiology.     Renal US shows medical renal disease which is probably CKD. Admission was complicated with hemorrhage posterior to renal biopsy, which required transfusion of 2 units of pRBCs with subsequent improvement on Hb and hemodynamic state.    Nephrology and Vascular surgery continue following the case.    Acute on chronic kidney disease  - Sustained, pt asymptomatic  - BUN:Cr ratio 8.29  - BUN/cr with slight down trend, but Cr sustained on 6.  -Nephrotic range proteinuria; 142/19 = 7.47gm  - Renal doppler US -- > no signs of stenosis, US showing no changes compared to admission.  - ESR elevated   -CRP wnl   - C3 mildly decreased discussed with dr. Littlejohn who recommended to hold corticosteroid therapy for now waiting for the other workup ordered.  - c/w IVF   - c/w monitoring renal function   - Hyperkalemia resolved.  -F/u ALBA, metanephrines, ds DNA  -F/U cbc and cmp in am  -PTH elevated, decreased Vit D -->Indicates ckd      -Started on Calcitriol 0.5 mg qd by Nephro   - Hold nephrotoxic medications   - hepatitis panel negative  - Nephrology consul noted and appreciated    S/p Renal Biopsy  -complicated with perirenal hemorrhage -->resolving, asymptomatic  -s/p transfusion of 2 units of pRBCs  -C/w pain control with Tylenol 650 mg q6 for mild pain and Percocet 5/350 mg q6 for moderate-severe pain.  -F/u Bx results -->Probably Tuesday/ Wednesday     Hypertension  - Stable  - Holding Lisinopril at the moment due to LAITH   - C/W Amlodipine 10 mg PO and titrate as needed.    Acute on Chronic Anemia of chronic disease   - Low TIBC, with low-normal Iron, ferritin and transferrin    - h/h decreasing secondary to biopsy hemorrhage  - Transfuse 2 units pRBC  - Follow up cbc on am   - given slightly lower ferritin could benefit from iron therapy   - Normocytic normochromic  - c/w ferrous home sulfate 325mg po qd   - s/p epogen x 1 dose as per nephro     Amenorrhea  - Likely secondary to hyperprolactinemia  - Will trend LH, FSH and prolactin - labs testing   - MRI brain with no evidence of pituitary adenoma - recommends dynamic pituitary study with contrast but pt is not able to receive contrast due to LAITH.   - Will consult endocrinology     Migraine   - Tylenol is not sufficient  - Started Fiorcet PRN    DVT prophylaxis  - Given hemorrhage, hold A/C  - Pt is ambulating   - scd boots b/l     Dispo: When pt is medically stable to return to home. Based on renal functioning and if worsens need for HD may need community HD center.

## 2018-09-04 VITALS — SYSTOLIC BLOOD PRESSURE: 148 MMHG | DIASTOLIC BLOOD PRESSURE: 98 MMHG

## 2018-09-04 LAB
ALBUMIN SERPL ELPH-MCNC: 3.7 G/DL — SIGNIFICANT CHANGE UP (ref 3.3–5.2)
ALP SERPL-CCNC: 77 U/L — SIGNIFICANT CHANGE UP (ref 40–120)
ALT FLD-CCNC: 10 U/L — SIGNIFICANT CHANGE UP
ANION GAP SERPL CALC-SCNC: 15 MMOL/L — SIGNIFICANT CHANGE UP (ref 5–17)
AST SERPL-CCNC: 13 U/L — SIGNIFICANT CHANGE UP
BILIRUB SERPL-MCNC: 0.6 MG/DL — SIGNIFICANT CHANGE UP (ref 0.4–2)
BUN SERPL-MCNC: 49 MG/DL — HIGH (ref 8–20)
C-ANCA SER-ACNC: NEGATIVE — SIGNIFICANT CHANGE UP
C-ANCA SER-ACNC: NEGATIVE — SIGNIFICANT CHANGE UP
CALCIUM SERPL-MCNC: 9.6 MG/DL — SIGNIFICANT CHANGE UP (ref 8.6–10.2)
CHLORIDE SERPL-SCNC: 99 MMOL/L — SIGNIFICANT CHANGE UP (ref 98–107)
CO2 SERPL-SCNC: 25 MMOL/L — SIGNIFICANT CHANGE UP (ref 22–29)
CREAT SERPL-MCNC: 6.31 MG/DL — HIGH (ref 0.5–1.3)
DSDNA AB SER-ACNC: 30 IU/ML — HIGH
DSDNA AB SER-ACNC: 33 IU/ML — HIGH
GBM IGG SER-ACNC: <0.2 U — SIGNIFICANT CHANGE UP
GLUCOSE SERPL-MCNC: 78 MG/DL — SIGNIFICANT CHANGE UP (ref 70–115)
HCT VFR BLD CALC: 34.2 % — LOW (ref 37–47)
HGB BLD-MCNC: 10.9 G/DL — LOW (ref 12–16)
INTERPRETATION SERPL IFE-IMP: SIGNIFICANT CHANGE UP
MCHC RBC-ENTMCNC: 27.3 PG — SIGNIFICANT CHANGE UP (ref 27–31)
MCHC RBC-ENTMCNC: 31.9 G/DL — LOW (ref 32–36)
MCV RBC AUTO: 85.5 FL — SIGNIFICANT CHANGE UP (ref 81–99)
P-ANCA SER-ACNC: NEGATIVE — SIGNIFICANT CHANGE UP
P-ANCA SER-ACNC: NEGATIVE — SIGNIFICANT CHANGE UP
PHOSPHOLIPASE A2 RECEPTOR ELISA: 3 RU/ML — SIGNIFICANT CHANGE UP
PHOSPHOLIPASE A2 RECEPTOR IFA: NEGATIVE — SIGNIFICANT CHANGE UP
PLATELET # BLD AUTO: 112 K/UL — LOW (ref 150–400)
POTASSIUM SERPL-MCNC: 4 MMOL/L — SIGNIFICANT CHANGE UP (ref 3.5–5.3)
POTASSIUM SERPL-SCNC: 4 MMOL/L — SIGNIFICANT CHANGE UP (ref 3.5–5.3)
PROT SERPL-MCNC: 7.3 G/DL — SIGNIFICANT CHANGE UP (ref 6.6–8.7)
RBC # BLD: 4 M/UL — LOW (ref 4.4–5.2)
RBC # FLD: 14 % — SIGNIFICANT CHANGE UP (ref 11–15.6)
SODIUM SERPL-SCNC: 139 MMOL/L — SIGNIFICANT CHANGE UP (ref 135–145)
WBC # BLD: 4.7 K/UL — LOW (ref 4.8–10.8)
WBC # FLD AUTO: 4.7 K/UL — LOW (ref 4.8–10.8)

## 2018-09-04 PROCEDURE — 99233 SBSQ HOSP IP/OBS HIGH 50: CPT

## 2018-09-04 PROCEDURE — 99239 HOSP IP/OBS DSCHRG MGMT >30: CPT

## 2018-09-04 PROCEDURE — 99497 ADVNCD CARE PLAN 30 MIN: CPT

## 2018-09-04 RX ORDER — DOCUSATE SODIUM 100 MG
1 CAPSULE ORAL
Qty: 60 | Refills: 0 | OUTPATIENT
Start: 2018-09-04 | End: 2018-10-03

## 2018-09-04 RX ORDER — FERROUS SULFATE 325(65) MG
1 TABLET ORAL
Qty: 30 | Refills: 0 | OUTPATIENT
Start: 2018-09-04 | End: 2018-10-03

## 2018-09-04 RX ORDER — ASCORBIC ACID 60 MG
1 TABLET,CHEWABLE ORAL
Qty: 30 | Refills: 0 | OUTPATIENT
Start: 2018-09-04 | End: 2018-10-03

## 2018-09-04 RX ORDER — ERGOCALCIFEROL 1.25 MG/1
50000 CAPSULE ORAL
Qty: 0 | Refills: 0 | Status: DISCONTINUED | OUTPATIENT
Start: 2018-09-04 | End: 2018-09-04

## 2018-09-04 RX ORDER — CARVEDILOL PHOSPHATE 80 MG/1
1 CAPSULE, EXTENDED RELEASE ORAL
Qty: 60 | Refills: 0 | OUTPATIENT
Start: 2018-09-04 | End: 2018-10-03

## 2018-09-04 RX ORDER — ERGOCALCIFEROL 1.25 MG/1
1 CAPSULE ORAL
Qty: 4 | Refills: 0 | OUTPATIENT
Start: 2018-09-04 | End: 2018-10-01

## 2018-09-04 RX ORDER — ACETAMINOPHEN 500 MG
2 TABLET ORAL
Qty: 120 | Refills: 0 | OUTPATIENT
Start: 2018-09-04 | End: 2018-09-18

## 2018-09-04 RX ORDER — AMLODIPINE BESYLATE 2.5 MG/1
1 TABLET ORAL
Qty: 30 | Refills: 0 | OUTPATIENT
Start: 2018-09-04 | End: 2018-10-03

## 2018-09-04 RX ORDER — CARVEDILOL PHOSPHATE 80 MG/1
25 CAPSULE, EXTENDED RELEASE ORAL EVERY 12 HOURS
Qty: 0 | Refills: 0 | Status: DISCONTINUED | OUTPATIENT
Start: 2018-09-04 | End: 2018-09-04

## 2018-09-04 RX ORDER — LISINOPRIL 2.5 MG/1
1 TABLET ORAL
Qty: 0 | Refills: 0 | COMMUNITY

## 2018-09-04 RX ORDER — CALCITRIOL 0.5 UG/1
1 CAPSULE ORAL
Qty: 30 | Refills: 0 | OUTPATIENT
Start: 2018-09-04 | End: 2018-10-03

## 2018-09-04 RX ADMIN — Medication 325 MILLIGRAM(S): at 12:58

## 2018-09-04 RX ADMIN — Medication 100 MILLIGRAM(S): at 17:39

## 2018-09-04 RX ADMIN — CALCITRIOL 0.5 MICROGRAM(S): 0.5 CAPSULE ORAL at 13:10

## 2018-09-04 RX ADMIN — AMLODIPINE BESYLATE 10 MILLIGRAM(S): 2.5 TABLET ORAL at 05:39

## 2018-09-04 RX ADMIN — ERGOCALCIFEROL 50000 UNIT(S): 1.25 CAPSULE ORAL at 17:39

## 2018-09-04 RX ADMIN — Medication 100 MILLIGRAM(S): at 05:39

## 2018-09-04 RX ADMIN — Medication 0.1 MILLIGRAM(S): at 15:38

## 2018-09-04 RX ADMIN — CARVEDILOL PHOSPHATE 25 MILLIGRAM(S): 80 CAPSULE, EXTENDED RELEASE ORAL at 17:40

## 2018-09-04 RX ADMIN — Medication 125 MEQ/KG/HR: at 09:47

## 2018-09-04 NOTE — DISCHARGE NOTE ADULT - CARE PLAN
Principal Discharge DX:	Acute on chronic kidney failure  Goal:	stable.  Assessment and plan of treatment:	You were found to have higher than normal creatinine levels. Creatinine is a substance given an idea of how the kidney is working. A normal value for creatinine is under 1, you were found to have 6.3, which was sustained over time.  Other tests done on you determined that your kidneys have been declining in function for a long period of time and currently your Kidneys are working at a 10% of their capacity which can cause accumulation of waste substances in your body and may require dialysis to be excreted.   A renal biopsy was performed in order to establish the cause of you renal disease, the results are still pending. You need to follow up with Nephrology.  If after your discharge you feel confused, noticed changes in your personality, have problem concentrating, notice easy bruising, difficulty breathing, are lethargic/ drowsy or if a member of your family finds you unresponsive, YOU MUST come back to the hospital because It can be a consequence of increased levels of toxic substances in your blood as a consequence of your kidneys not working adequately.     You need to follow up with nephrology within 1 week from your discharge for further management of you chronic kidney disease and follow up of the blood work and biopsy performed while you were admitted.     Please follow up with your primary care doctor within 1 week from your discharge.  Secondary Diagnosis:	Hypertension  Goal:	to keep under 130/80 mmHg  Assessment and plan of treatment:	Continue taking Amlodipine as prescribed to prevent your blood pressure from rising. Follow up with nephrology and primary care doctor within 1 week from your discharge.  Secondary Diagnosis:	Acute on chronic anemia  Goal:	to prevent further decrease of the hemoglobin  Assessment and plan of treatment:	You were found have anemia when admitted. Your anemia was severe and you needed to be transfused with 2 packs of red blood cells in order to increase your hemoglobin. Hemoglobin is a protein that transports oxygen in your blood, if it is low it can cause damage to your brain, heart, lungs, kidney and every other organ in your body. Due to the chronic kidney disease you are no producing enough red blood cells, and you received and injections of a hormone to help you increase the production of red blood cells.  You need to Follow up with nephrology and primary care doctor within 1 week from your discharge.  Secondary Diagnosis:	Amenorrhea  Assessment and plan of treatment:	You were found to have increased levels of a hormone called Prolactin which is causing your periods to stop. The increase in prolactin can be related to the chronic kidney disease you have or to other cause. A MRI of you brain was performed but it did not showed any lesions in your brain that may explain the elevation in prolactin. You need to follow up with your primary care doctor within 1 week from your discharge for further management.  Secondary Diagnosis:	Migraine without aura, not intractable, without status migrainosus  Assessment and plan of treatment:	You have a history of migraines. Please take Tylenol as prescribed and follow up with your primary care doctor for further management.    Should your symptoms persist for a long time or worsen, call your Primary Care Doctor or come to the ER. If you notice you are having more than 8 episodes of headache or related symptoms (visual disturbances, nausea, numbness/tingling), we recommend you see a neurologist. Principal Discharge DX:	Acute on chronic kidney failure  Goal:	stable.  Assessment and plan of treatment:	You were found to have higher than normal creatinine levels. Creatinine is a substance given an idea of how the kidney is working. A normal value for creatinine is under 1, you were found to have 6.3, which was sustained over time.  Other tests done on you determined that your kidneys have been declining in function for a long period of time and currently your Kidneys are working at a 10% of their capacity which can cause accumulation of waste substances in your body and may require dialysis to be excreted.   A renal biopsy was performed in order to establish the cause of you renal disease, the results are still pending. You need to follow up with Nephrology.  If after your discharge you feel confused, noticed changes in your personality, have problem concentrating, notice easy bruising, difficulty breathing, are lethargic/ drowsy or if a member of your family finds you unresponsive, YOU MUST come back to the hospital because It can be a consequence of increased levels of toxic substances in your blood as a consequence of your kidneys not working adequately.     You need to follow up with nephrology within 1 week from your discharge for further management of you chronic kidney disease and follow up of the blood work and biopsy performed while you were admitted.     Please follow up with your primary care doctor within 1 week from your discharge.  Secondary Diagnosis:	Hypertension  Goal:	to keep under 130/80 mmHg  Assessment and plan of treatment:	Continue taking your medications as prescribed to prevent your blood pressure from rising. Follow up with nephrology and primary care doctor within 1 week from your discharge.  Secondary Diagnosis:	Acute on chronic anemia  Goal:	to prevent further decrease of the hemoglobin  Assessment and plan of treatment:	You were found have anemia when admitted. Your anemia was severe and you needed to be transfused with 2 units of red blood cells in order to increase your hemoglobin. Hemoglobin is a protein that transports oxygen in your blood, if it is low it can cause damage to your brain, heart, lungs, kidney and every other organ in your body. Due to the chronic kidney disease you are no producing enough red blood cells, and you received and injections of a hormone to help you increase the production of red blood cells.  You need to Follow up with nephrology and primary care doctor within 1 week from your discharge.  Secondary Diagnosis:	Amenorrhea  Assessment and plan of treatment:	You were found to have increased levels of a hormone called Prolactin which is causing your periods to stop. The increase in prolactin can be related to the chronic kidney disease you have or to other cause. A MRI of you brain was performed but it did not showed any lesions in your brain that may explain the elevation in prolactin. You need to follow up with your primary care doctor within 1 week from your discharge for further management.  Secondary Diagnosis:	Migraine without aura, not intractable, without status migrainosus  Assessment and plan of treatment:	You have a history of migraines. Please take medications as prescribed and follow up with your primary care doctor.    Should your symptoms persist for a long time or worsen, call your Primary Care Doctor or come to the ER. If you notice you are having more than 8 episodes of headache or related symptoms (visual disturbances, nausea, numbness/tingling), we recommend you see a neurologist. Principal Discharge DX:	Acute on chronic kidney failure  Goal:	stable.  Assessment and plan of treatment:	You were found to have higher than normal creatinine levels. Creatinine is a substance given an idea of how the kidney is working. A normal value for creatinine is under 1, you were found to have 6.3, which was sustained over time.  Other tests done on you determined that your kidneys have been declining in function for a long period of time and currently your Kidneys are working at a 10% of their capacity which can cause accumulation of waste substances in your body and may require dialysis to be excreted.   A renal biopsy was performed in order to establish the cause of you renal disease, the results are still pending. You need to follow up with Nephrology.  If after your discharge you feel confused, noticed changes in your personality, have problem concentrating, notice easy bruising, difficulty breathing, are lethargic/ drowsy or if a member of your family finds you unresponsive, YOU MUST come back to the hospital because It can be a consequence of increased levels of toxic substances in your blood as a consequence of your kidneys not working adequately.     You need to follow up with nephrology within 1 week from your discharge for further management of you chronic kidney disease and follow up of the blood work and biopsy performed while you were admitted.     Please follow up with your primary care doctor within 1 week from your discharge.  Secondary Diagnosis:	Hypertension  Goal:	to keep under 130/80 mmHg  Assessment and plan of treatment:	Continue taking your medications as prescribed to prevent your blood pressure from rising.   Follow up with your primary care doctor for a repeat of BP within 2-3 days after discharge.  Follow up with nephrology within 1 week of discharge from the hospital  Secondary Diagnosis:	Acute on chronic anemia  Goal:	to prevent further decrease of the hemoglobin  Assessment and plan of treatment:	You were found have anemia when admitted. Your anemia was severe and you needed to be transfused with 2 units of red blood cells in order to increase your hemoglobin. Hemoglobin is a protein that transports oxygen in your blood, if it is low it can cause damage to your brain, heart, lungs, kidney and every other organ in your body. Due to the chronic kidney disease you are no producing enough red blood cells, and you received and injections of a hormone to help you increase the production of red blood cells.  Take iron pills as prescribed, with vitamin C  Secondary Diagnosis:	Amenorrhea  Assessment and plan of treatment:	You were found to have increased levels of a hormone called Prolactin which is causing your periods to stop. The increase in prolactin can be related to the chronic kidney disease you have or to other cause. A MRI of you brain was performed but it did not showed any lesions in your brain that may explain the elevation in prolactin. You need to follow up with your primary care doctor within 1 week from your discharge for further management.  Secondary Diagnosis:	Migraine without aura, not intractable, without status migrainosus  Assessment and plan of treatment:	You have a history of migraines. Please take medications as prescribed and follow up with your primary care doctor.    Should your symptoms persist for a long time or worsen, call your Primary Care Doctor or come to the ER. If you notice you are having more than 8 episodes of headache or related symptoms (visual disturbances, nausea, numbness/tingling), we recommend you see a neurologist. Principal Discharge DX:	Acute on chronic kidney failure  Goal:	stable.  Assessment and plan of treatment:	You were found to have higher than normal creatinine levels. Creatinine is a substance given an idea of how the kidney is working. A normal value for creatinine is under 1, you were found to have 6.3, which was sustained over time.  Other tests done on you determined that your kidneys have been declining in function for a long period of time and currently your Kidneys are working at a 10% of their capacity which can cause accumulation of waste substances in your body and may require dialysis to be excreted.   A renal biopsy was performed in order to establish the cause of you renal disease, the results are still pending. You need to follow up with Nephrology.  If after your discharge you feel confused, noticed changes in your personality, have problem concentrating, notice easy bruising, difficulty breathing, are lethargic/ drowsy or if a member of your family finds you unresponsive, YOU MUST come back to the hospital because It can be a consequence of increased levels of toxic substances in your blood as a consequence of your kidneys not working adequately.     You need to follow up with nephrology within 7 days from your discharge for further management of you chronic kidney disease and follow up of the blood work and biopsy performed while you were admitted.     Please follow up with your primary care doctor within 2 days  from your discharge.  Secondary Diagnosis:	Hypertension  Goal:	to keep under 130/80 mmHg  Assessment and plan of treatment:	Continue taking your medications as prescribed to prevent your blood pressure from rising.   Follow up with your primary care doctor for a repeat of BP within 2-3 days after discharge.  Follow up with nephrology within 7 days of discharge from the hospital  Secondary Diagnosis:	Acute on chronic anemia  Goal:	to prevent further decrease of the hemoglobin  Assessment and plan of treatment:	You were found have anemia when admitted. Your anemia was severe and you needed to be transfused with 2 units of red blood cells in order to increase your hemoglobin. Hemoglobin is a protein that transports oxygen in your blood, if it is low it can cause damage to your brain, heart, lungs, kidney and every other organ in your body. Due to the chronic kidney disease you are no producing enough red blood cells, and you received and injections of a hormone to help you increase the production of red blood cells.  Take iron pills as prescribed, with vitamin C  Secondary Diagnosis:	Amenorrhea  Assessment and plan of treatment:	You were found to have increased levels of a hormone called Prolactin which is causing your periods to stop. The increase in prolactin can be related to the chronic kidney disease you have or to other cause. A MRI of you brain was performed but it did not showed any lesions in your brain that may explain the elevation in prolactin. You need to follow up with your primary care doctor within 2 days from your discharge for further management.  Secondary Diagnosis:	Migraine without aura, not intractable, without status migrainosus  Assessment and plan of treatment:	You have a history of migraines. Please take medications as prescribed and follow up with your primary care doctor.    Should your symptoms persist for a long time or worsen, call your Primary Care Doctor or come to the ER. If you notice you are having more than 8 episodes of headache or related symptoms (visual disturbances, nausea, numbness/tingling), we recommend you see a neurologist.

## 2018-09-04 NOTE — DISCHARGE NOTE ADULT - OTHER SIGNIFICANT FINDINGS
COMPLETE BLOOD COUNT  __________________________________________________________________________________             10.9   4.7   )-----------( 112      ( 04 Sep 2018 08:26 )             34.2                           9.6    4.8   )-----------( 109      ( 03 Sep 2018 07:27 )             28.7                  9.4    x     )-----------( x        ( 02 Sep 2018 01:18 )             28.5       CHEM  ___________________________________________________________________________________        139  |  99  |  49.0<H>  ----------------------------<  78  4.0   |  25.0  |  6.31<H>    Ca    9.6      04 Sep 2018 08:26  Phos  4.4     09-03  Mg     1.8     09-03    TPro  7.3  /  Alb  3.7  /  TBili  0.6  /  DBili  x   /  AST  13  /  ALT  10  /  AlkPhos  77  -04    0902    143  |  104  |  53.0<H>  ----------------------------<  94  4.4   |  26.0  |  6.39<H>    Ca    8.9      02 Sep 2018 01:18  Phos  4.5     08-31  Mg     2.3     08-31    TPro  6.1<L>  /  Alb  3.3  /  TBili  0.3<L>  /  DBili  x   /  AST  8   /  ALT  9   /  AlkPhos  60      MIRIAM Kappa: 13.37 mg/dL (18 @ 19:52)  MIRIAM Lambda: 15.70 mg/dL (18 @ 19:52)    Intact PTH: 181: PTH METHOD: Roche  Calcium, Total Serum: 8.9 mg/dL (18 @ 01:18)    Cytoplasmic (c-ANCA) Antibody: Negative (18 @ 20:54)    Hemoglobin A1C, Whole Blood: 5.2 % ( @ 06:22)    Serum Pro-Brain Natriuretic Peptide: 1983 pg/mL ( @ 06:22)  Serum Pro-Brain Natriuretic Peptide: 1712 pg/mL ( @ 22:42)    Immunoglobulin Free Light Chains, Serum (18 @ 19:52)    [place]/Lambda Free Light Chain Ratio, Serum: 0.85 Ratio    MIRIAM Kappa: 13.37 mg/dL    [place]/Lambda Free Light Chain Ratio, Serum: 0.85 Ratio (18 @ 19:52)    Double Stranded DNA Antibody (18 @ 20:54)    Double Stranded DNA Antibody: 33: Method: EIA            Reference Ranges            Interpretation            < 30      IU/mL     Negative            30 - 75  IU/mL     Borderline            > 75      IU/mL     Positive IU/mL        URINE    ______________________________________________________________________             Urinalysis Basic - ( 31 Aug 2018 02:25 )  Color: Yellow / Appearance: Clear / S.005 / pH: x  Gluc: x / Ketone: Negative  / Bili: Negative / Urobili: Negative mg/dL   Blood: x / Protein: 100 mg/dL / Nitrite: Negative   Leuk Esterase: Negative / RBC: 6-10 /HPF / WBC 0-2   Sq Epi: x / Non Sq Epi: Moderate / Bacteria: Few    ___________________________________________________________________________________  US Duplex Kidneys (18 @ 14:51) >  IMPRESSION:     No evidence of a significant renal artery stenosis.    Echogenic kidneys compatible with medical renal disease.    Right renal perinephric collection compatible with the known right   perinephrichematoma.

## 2018-09-04 NOTE — PROGRESS NOTE ADULT - ASSESSMENT
32 yo F h/o HTN p/w acute renal failure consulted for permacath placement. Patient continues to decline permacath placement with the hopes of renal function improvement. Patient was instructed to avoid getting blood drawn from Lt arm due to the possibility of a future intervention.    Plan:  - F/u Patient Renal function  - F/u with patient for desire to get permacath and get HD  - Safe Left Arm order  - Continue treatment as per primary team 32 yo F h/o HTN p/w acute renal failure consulted for permacath placement. Patient continues to decline permacath placement with the hopes of renal function improvement. Patient was instructed to avoid getting blood drawn or IVs in Lt arm due to the possibility of a future intervention.    Plan:  - F/u Patient Renal function  - F/u with patient for desire to get permacath and get HD  - Restricted Left Arm order  - Continue treatment as per primary team

## 2018-09-04 NOTE — DISCHARGE NOTE ADULT - ADDITIONAL INSTRUCTIONS
-->Follow up with Nephrology (Kidney doctor): Dr. Littlejohn on his office on tuesdays and or wenesdays. YOU MUST go as soon as possible within 1 week from your discharge.  -->Follow up with your primary care doctor within 1 week from your discharge.   -->Continue with Amlodipine as indicated to control your blood pressure.  -->Take Tylenol as indicated if you have a migraine. DO NOT take ibuprofen, it was worsen your kidney function.  --> YOU MUST  SEEK medical attention if you feel confused, noticed changes in your personality, have problem concentrating, notice easy bruising or bleeding, difficulty breathing, are lethargic/ drowsy or if a member of your family finds you unresponsive -->Follow up with Nephrology (Kidney doctor): Dr. Littlejohn on his office on tuesdays and or wenesdays. YOU MUST go as soon as possible within 1 week from your discharge.  -->Follow up with your primary care doctor within 2 days  from your discharge to check your blood pressure   -->Continue with Amlodipine as indicated to control your blood pressure.  -->Take Tylenol as indicated if you have a migraine. DO NOT take ibuprofen, it was worsen your kidney function.  --> YOU MUST  SEEK medical attention if you feel confused, noticed changes in your personality, have problem concentrating, notice easy bruising or bleeding, difficulty breathing, are lethargic/ drowsy or if a member of your family finds you unresponsive

## 2018-09-04 NOTE — DISCHARGE NOTE ADULT - CARE PROVIDER_API CALL
Kevin Littlejohn (DO), Internal Medicine  [address] [place] [address]  Phone: [phone]  Fax: [phone]    [facility] [place],   Address: [address] [place] [address], [place] [address]  Phone: [phone]  Phone: (   )    -  Fax: (   )    -

## 2018-09-04 NOTE — DISCHARGE NOTE ADULT - HOSPITAL COURSE
33 years old female with Hx of recently diagnosed hypertension and migraines was treated with Propanolol for two weeks and thereafter prescribed lisinopril due to intolerance of propranolol as well as incidental finding of elevated prolactin level on labs with noted cessation of menstruation since 2018; pt was referred from [facility] due to elevated creatinine, with concern for acute vs acute on chronic kidney failure unclear etiology.    Renal US showed medical renal disease. Increased PTH showed the patient has chronic kidney disease. Patient showed borderline levels of anti DNA and mild  of Complement levels, it was discussed with Nephrology and decided to wait for results of renal biopsy and further work up. Admission was complicated with hemorrhage posterior to renal biopsy, which required transfusion of 2 units of pRBCs with subsequent improvement on Hb and hemodynamic state. Patient received one dose of Procrit and was started on Rocaltrol.    While on the medical floor patient was seen and examined at the bedside daily. Her blood pressure was controlled with Norvasc 10 mg qd. Despite hydration and holding nephrotoxic medication, the patient showed no improvement of kidney function remaining on GFR of 9. Patient was advised on starting HD but she refused due to Yazidism beliefs.      Patient is medically stable and will be discharge with recommendations to follow up with Nephrology and primary care doctor within 1 week from her discharge.    Estimated time for discharge plannin min.

## 2018-09-04 NOTE — PROGRESS NOTE ADULT - SUBJECTIVE AND OBJECTIVE BOX
cc: Patient is a 33y old  Female who presents with a chief complaint of Kidney failure (31 Aug 2018 01:19)    Followed by:   -Nephro: Dr. Littlejohn  -Vascular surgery: Dr Sheehan- [place]    INTERVAL HPI/OVERNIGHT EVENTS:  Patient seen and examined at beside. As per nurse, there were no acute events over night. Patient refers feeling well, she is tolerating PO w/o nausea/vomiting, she is actively voiding clear urine w/o hematuria dysuria. Patient last BM 3 days ago, and is [facility] is actively draining clear urine. Patient denies nausea/Vomiting, fever, calf pain, SOB, diarrhea. Rest of ROS negative except for above.    --> Patient states her intention to wait until results of biopsy to decide if she wants to go to HD or accept the permacath. Patient states in several oportunities that she is waiting for a miracle from God and that she thinks it was the biopsy and anemia that cause her kidney to fail. It was explained to the patient that her anemia was a result of the kidney failure which is chronic. Also,  that her kidney function has not improved since her admission despite hydration and that it will most likely require HD to prevent complications. Patient remained not amenable to catheter placement and HD.    Allergies: No Known Allergies    I&O's Summary     02 Sep 2018 07:  -  03 Sep 2018 07:00  --------------------------------------------------------  IN: 2550 mL / OUT: 1962 mL / NET: 588 mL    03 Sep 2018 07:  -  03 Sep 2018 16:15  --------------------------------------------------------  IN: 1125 mL / OUT: 860 mL / NET: 265 mL -->Pt has been voiding in toilet and urine output has not been measured.       Vital Signs Last 24 Hrs  Vital Signs Last 24 Hrs  T(C): 37.3 (04 Sep 2018 04:08), Max: 37.3 (04 Sep 2018 04:08)  T(F): 99.2 (04 Sep 2018 04:08), Max: 99.2 (04 Sep 2018 04:08)  HR: 89 (04 Sep 2018 04:08) (87 - 112)  BP: 121/81 (04 Sep 2018 04:08) (121/81 - 151/91)  RR: 19 (03 Sep 2018 22:12) (18 - 19)  SpO2: 100% (03 Sep 2018 22:12) (100% - 100%)      Physical Exam:   GENERAL: NAD, well-groomed, well-developed. Cooperative. Afebrile, eupneic.  SKIN: Mild pallor. Mild palpebral edema w/o limitation to eye movements.   NECK: Supple, No JVD, Normal thyroid  NERVOUS SYSTEM:  Alert & Oriented X3, Good concentration; No focal signs.   RESP: Clear to auscultation bilaterally; No rales, rhonchi, wheezing, or rubs.  CVS: Regular rate and rhythm. No murmurs, rubs, or gallops. Capillary refill <2s  GI: On the back: incision is noted with clean/dry dressing, no active exudate/bleeding, no hematoma was observed. Bowel sounds are present. Abdomen in soft and non tender to palpation.   EXTREMITIES:  2+ Peripheral Pulses. Mild edema on hands. No clubbing.    LYMPH: No cervical or supraclavicular lymphadenopathy noted    LABS:    COMPLETE BLOOD COUNT  __________________________________________________________________________________                          9.6    4.8   )-----------( 109      ( 03 Sep 2018 07:27 )             28.7                  9.4    x     )-----------( x        ( 02 Sep 2018 01:18 )             28.5       CHEM  ___________________________________________________________________________________          140  |  99  |  49.0<H>  ----------------------------<  87  4.6   |  27.0  |  6.33<H>    Ca    8.8      03 Sep 2018 07:27  Phos  4.4     -  Mg     1.8         143  |  104  |  53.0<H>  ----------------------------<  94  4.4   |  26.0  |  6.39<H>    Ca    8.9      02 Sep 2018 01:18  Phos  4.5       Mg     2.3         TPro  6.1<L>  /  Alb  3.3  /  TBili  0.3<L>  /  DBili  x   /  AST  8   /  ALT  9   /  AlkPhos  60      MIRIAM Kappa: 13.37 mg/dL (18 @ 19:52)  MIRIAM Lambda: 15.70 mg/dL (18 @ 19:52)    Intact PTH: 181: PTH METHOD: Roche  Calcium, Total Serum: 8.9 mg/dL (18 @ 01:18)      Hemoglobin A1C, Whole Blood: 5.2 % ( @ 06:22)    Serum Pro-Brain Natriuretic Peptide: 1983 pg/mL ( @ 06:22)  Serum Pro-Brain Natriuretic Peptide: 1712 pg/mL ( @ 22:42)       URINE    ______________________________________________________________________             Urinalysis Basic - ( 31 Aug 2018 02:25 )  Color: Yellow / Appearance: Clear / S.005 / pH: x  Gluc: x / Ketone: Negative  / Bili: Negative / Urobili: Negative mg/dL   Blood: x / Protein: 100 mg/dL / Nitrite: Negative   Leuk Esterase: Negative / RBC: 6-10 /HPF / WBC 0-2   Sq Epi: x / Non Sq Epi: Moderate / Bacteria: Few    ___________________________________________________________________________________  US Duplex Kidneys (18 @ 14:51) >  IMPRESSION:     No evidence of a significant renal artery stenosis.    Echogenic kidneys compatible with medical renal disease.    Right renal perinephric collection compatible with the known right   perinephrichematoma.    < end of copied text >  _____________________________________________________________________________________    MEDICATIONS  (STANDING):  amLODIPine   Tablet 10 milliGRAM(s) Oral daily  calcitriol   Capsule 0.5 MICROGram(s) Oral daily  docusate sodium 100 milliGRAM(s) Oral two times a day  ferrous    sulfate 325 milliGRAM(s) Oral daily  influenza   Vaccine 0.5 milliLiter(s) IntraMuscular once  senna 2 Tablet(s) Oral at bedtime  sodium bicarbonate  Infusion 0.183 mEq/kG/Hr (125 mL/Hr) IV Continuous <Continuous>      MEDICATIONS  (PRN):  acetaminophen   Tablet. 650 milliGRAM(s) Oral every 6 hours PRN Moderate Pain (4 - 6)  acetaminophen 325 mG/butalbital 50 mG/caffeine 40 mG 1 Tablet(s) Oral every 6 hours PRN headache  oxyCODONE    5 mG/acetaminophen 325 mG 2 Tablet(s) Oral every 6 hours PRN Severe Pain (7 - 10)  polyethylene glycol 3350 17 Gram(s) Oral daily PRN Constipation cc: Patient is a 33y old  Female who presents with a chief complaint of Kidney failure (31 Aug 2018 01:19)    Followed by:   -Nephro: Dr. Littlejohn  -Vascular surgery: Dr Sheehan- [place]    INTERVAL HPI/OVERNIGHT EVENTS:  Patient seen and examined at beside. As per nurse, there were no acute events over night. Patient refers feeling well, she is tolerating PO w/o nausea/vomiting only refers early satiety. Patient is actively voiding clear urine w/o hematuria dysuria. Patient refers no BM in 4 days. Patient denies nausea/Vomiting, fever, calf pain, SOB, diarrhea. Rest of ROS negative except for above.    --> The patient referred that her  and praying group from her Yazidism counseled her to wait for 2 weeks before deciding to go to HD. Patient thinks that kidney bx did not allowed her kidney to improve, it was explained to the patient that the biopsy only takes a small sample of the kidney and is not the cause of her CKD. While rounding with Dr. Schultz, the patient authorized the medical team to discuss her case with a physician from her [place] and he'll would discuss the HD option with the patient. The patient is still refusing the HD and cath placement.      --> From previous day: Patient states her intention to wait until results of biopsy to decide if she wants to go to HD or accept the permacath. Patient states in several opportunities that she is waiting for a miracle from God and that she thinks it was the biopsy and anemia that cause her kidney to fail. It was explained to the patient that her anemia was a result of the kidney failure which is chronic. Also,  that her kidney function has not improved since her admission despite hydration and that it will most likely require HD to prevent complications. Patient remained not amenable to catheter placement and HD.    Allergies: No Known Allergies    I&O's Summary     02 Sep 2018 07:  -  03 Sep 2018 07:00  --------------------------------------------------------  IN: 2550 mL / OUT: 1962 mL / NET: 588 mL    03 Sep 2018 07:01  -  03 Sep 2018 16:15  --------------------------------------------------------  IN: 1125 mL / OUT: 860 mL / NET: 265 mL -->Pt has been voiding in toilet and urine output has not been measured.       Vital Signs Last 24 Hrs  T(C): 37.3 (04 Sep 2018 04:08), Max: 37.3 (04 Sep 2018 04:08)  T(F): 99.2 (04 Sep 2018 04:08), Max: 99.2 (04 Sep 2018 04:08)  HR: 89 (04 Sep 2018 04:08) (87 - 112)  BP: 121/81 (04 Sep 2018 04:08) (121/81 - 151/91)  RR: 19 (03 Sep 2018 22:12) (18 - 19)  SpO2: 100% (03 Sep 2018 22:12) (100% - 100%)      Physical Exam:   GENERAL: NAD, well-groomed, well-developed. Cooperative. Afebrile, eupneic.  SKIN: Mild pallor. Mild palpebral edema w/o limitation to eye movements.   NECK: Supple, No JVD, Normal thyroid  NERVOUS SYSTEM:  Alert & Oriented X3, Good concentration; No focal signs.   RESP: Clear to auscultation bilaterally; No rales, rhonchi, wheezing, or rubs.  CVS: Regular rate and rhythm. No murmurs, rubs, or gallops. Capillary refill <2s  GI: On the back: incision is noted with clean/dry dressing, no active exudate/bleeding, no hematoma was observed. Bowel sounds are present. Abdomen in soft and non tender to palpation.   EXTREMITIES:  2+ Peripheral Pulses. Mild edema on hands. No clubbing.    LYMPH: No cervical or supraclavicular lymphadenopathy noted        LABS:    COMPLETE BLOOD COUNT  __________________________________________________________________________________                          10.9   4.7   )-----------( 112      ( 04 Sep 2018 08:26 )             34.2                           9.6    4.8   )-----------( 109      ( 03 Sep 2018 07:27 )             28.7                  9.4    x     )-----------( x        ( 02 Sep 2018 01:18 )             28.5       CHEM  ___________________________________________________________________________________        139  |  99  |  49.0<H>  ----------------------------<  78  4.0   |  25.0  |  6.31<H>    Ca    9.6      04 Sep 2018 08:26  Phos  4.4     09-03  Mg     1.8         TPro  7.3  /  Alb  3.7  /  TBili  0.6  /  DBili  x   /  AST  13  /  ALT  10  /  AlkPhos  77  03    140  |  99  |  49.0<H>  ----------------------------<  87  4.6   |  27.0  |  6.33<H>    Ca    8.8      03 Sep 2018 07:27  Phos  4.4     09-03  Mg     1.8     02    143  |  104  |  53.0<H>  ----------------------------<  94  4.4   |  26.0  |  6.39<H>    Ca    8.9      02 Sep 2018 01:18  Phos  4.5     08-31  Mg     2.3     08-    TPro  6.1<L>  /  Alb  3.3  /  TBili  0.3<L>  /  DBili  x   /  AST  8   /  ALT  9   /  AlkPhos  60      MIRIAM Kappa: 13.37 mg/dL (18 @ 19:52)  MIRIAM Lambda: 15.70 mg/dL (18 @ 19:52)    Intact PTH: 181: PTH METHOD: Roche  Calcium, Total Serum: 8.9 mg/dL (18 @ 01:18)      Cytoplasmic (c-ANCA) Antibody: Negative (18 @ 20:54)    Hemoglobin A1C, Whole Blood: 5.2 % ( @ 06:22)    Serum Pro-Brain Natriuretic Peptide: 1983 pg/mL ( @ 06:22)  Serum Pro-Brain Natriuretic Peptide: 1712 pg/mL ( @ 22:42)    Immunoglobulin Free Light Chains, Serum (18 @ 19:52)    [place]/Lambda Free Light Chain Ratio, Serum: 0.85 Ratio    MIRIAM Kappa: 13.37 mg/dL    [place]/Lambda Free Light Chain Ratio, Serum: 0.85 Ratio (18 @ 19:52)    Double Stranded DNA Antibody (18 @ 20:54)    Double Stranded DNA Antibody: 33: Method: EIA            Reference Ranges            Interpretation            < 30      IU/mL     Negative            30 - 75  IU/mL     Borderline            > 75      IU/mL     Positive IU/mL        URINE    ______________________________________________________________________             Urinalysis Basic - ( 31 Aug 2018 02:25 )  Color: Yellow / Appearance: Clear / S.005 / pH: x  Gluc: x / Ketone: Negative  / Bili: Negative / Urobili: Negative mg/dL   Blood: x / Protein: 100 mg/dL / Nitrite: Negative   Leuk Esterase: Negative / RBC: 6-10 /HPF / WBC 0-2   Sq Epi: x / Non Sq Epi: Moderate / Bacteria: Few    ___________________________________________________________________________________  US Duplex Kidneys (18 @ 14:51) >  IMPRESSION:     No evidence of a significant renal artery stenosis.    Echogenic kidneys compatible with medical renal disease.    Right renal perinephric collection compatible with the known right   perinephrichematoma.    < end of copied text >  _____________________________________________________________________________________    MEDICATIONS  (STANDING):  amLODIPine   Tablet 10 milliGRAM(s) Oral daily  calcitriol   Capsule 0.5 MICROGram(s) Oral daily  docusate sodium 100 milliGRAM(s) Oral two times a day  ferrous    sulfate 325 milliGRAM(s) Oral daily  influenza   Vaccine 0.5 milliLiter(s) IntraMuscular once  senna 2 Tablet(s) Oral at bedtime  sodium bicarbonate  Infusion 0.183 mEq/kG/Hr (125 mL/Hr) IV Continuous <Continuous>    MEDICATIONS  (PRN):  acetaminophen   Tablet. 650 milliGRAM(s) Oral every 6 hours PRN Moderate Pain (4 - 6)  acetaminophen 325 mG/butalbital 50 mG/caffeine 40 mG 1 Tablet(s) Oral every 6 hours PRN headache  oxyCODONE    5 mG/acetaminophen 325 mG 2 Tablet(s) Oral every 6 hours PRN Severe Pain (7 - 10)  polyethylene glycol 3350 17 Gram(s) Oral daily PRN Constipation

## 2018-09-04 NOTE — DISCHARGE NOTE ADULT - SECONDARY DIAGNOSIS.
Hypertension Acute on chronic anemia Amenorrhea Migraine without aura, not intractable, without status migrainosus

## 2018-09-04 NOTE — DISCHARGE NOTE ADULT - PROVIDER TOKENS
TOKEN:'[id number]',FREE:[LAST:[[facility]],PHONE:[(   )    -],FAX:[(   )    -],ADDRESS:[Address: [address] [place] [address], [place] [address]  Phone: [phone]]]

## 2018-09-04 NOTE — PROGRESS NOTE ADULT - SUBJECTIVE AND OBJECTIVE BOX
HPI/OVERNIGHT EVENTS: No acute events overnight. Patient continues to refuse permacath, hence refusing HD. Patient was oriented on the pro's and con's of not undergoing the procedure and patient acknowledge. Regardless patient prefers to wait and see if renal function improves. Denies nausea, vomiting, fever, chest pain or SOB.    MEDICATIONS  (STANDING):  amLODIPine   Tablet 10 milliGRAM(s) Oral daily  calcitriol   Capsule 0.5 MICROGram(s) Oral daily  docusate sodium 100 milliGRAM(s) Oral two times a day  ferrous    sulfate 325 milliGRAM(s) Oral daily  influenza   Vaccine 0.5 milliLiter(s) IntraMuscular once  senna 2 Tablet(s) Oral at bedtime  sodium bicarbonate  Infusion 0.183 mEq/kG/Hr (125 mL/Hr) IV Continuous <Continuous>    MEDICATIONS  (PRN):  acetaminophen   Tablet. 650 milliGRAM(s) Oral every 6 hours PRN Moderate Pain (4 - 6)  acetaminophen 325 mG/butalbital 50 mG/caffeine 40 mG 1 Tablet(s) Oral every 6 hours PRN headache  oxyCODONE    5 mG/acetaminophen 325 mG 2 Tablet(s) Oral every 6 hours PRN Severe Pain (7 - 10)  polyethylene glycol 3350 17 Gram(s) Oral daily PRN Constipation      Vital Signs Last 24 Hrs  T(C): 37.3 (04 Sep 2018 04:08), Max: 37.3 (04 Sep 2018 04:08)  T(F): 99.2 (04 Sep 2018 04:08), Max: 99.2 (04 Sep 2018 04:08)  HR: 89 (04 Sep 2018 04:08) (87 - 112)  BP: 121/81 (04 Sep 2018 04:08) (121/81 - 151/91)  BP(mean): --  RR: 19 (03 Sep 2018 22:12) (18 - 19)  SpO2: 100% (03 Sep 2018 22:12) (100% - 100%)    Gen:  NAD  Pulm: nonlabored breathing, CTAB/L  CV: S1, S2, RRR  Abd:  soft, nontender, nondistended.  no flank tenderness.  Ext: no swelling, cyanosis or clubbing  Vasc: 2+ peripheral pulses throughout  Neuro: AACx3, no motor or sensory deficits      I&O's Detail    03 Sep 2018 07:01  -  04 Sep 2018 07:00  --------------------------------------------------------  IN:    sodium bicarbonate  Infusion: 1125 mL  Total IN: 1125 mL    OUT:    Voided: 860 mL  Total OUT: 860 mL    Total NET: 265 mL          LABS:                        10.9   4.7   )-----------( 112      ( 04 Sep 2018 08:26 )             34.2     09-03    140  |  99  |  49.0<H>  ----------------------------<  87  4.6   |  27.0  |  6.33<H>    Ca    8.8      03 Sep 2018 07:27  Phos  4.4     09-03  Mg     1.8     09-03

## 2018-09-04 NOTE — PROGRESS NOTE ADULT - PROVIDER SPECIALTY LIST ADULT
Family Medicine
Nephrology
Vascular Surgery
Vascular Surgery
Family Medicine
Family Medicine

## 2018-09-04 NOTE — PROGRESS NOTE ADULT - ASSESSMENT
Called  Renal Pathology - Report P :    Patient refuses any HD,    PCP seemingly likes the patient to have a second Opinion, Before further Intervention,    BX., Site Dry,  IV D.C.

## 2018-09-04 NOTE — PROGRESS NOTE ADULT - SUBJECTIVE AND OBJECTIVE BOX
HPI/ OVERNIGHT EVENTS: No acute events overnight. Patient continues to refuse CVC , hence refusing HD. Patient was oriented on the pro's and con's of not undergoing the procedure and patient acknowledge. Regardless patient prefers to wait and see if renal function improves. Denies nausea, vomiting, fever, chest pain or SOB.    MEDICATIONS  (STANDING):  amLODIPine   Tablet 10 milliGRAM(s) Oral daily  calcitriol   Capsule 0.5 MICROGram(s) Oral daily  docusate sodium 100 milliGRAM(s) Oral two times a day  ferrous    sulfate 325 milliGRAM(s) Oral daily  influenza   Vaccine 0.5 milliLiter(s) IntraMuscular once  senna 2 Tablet(s) Oral at bedtime  sodium bicarbonate  Infusion 0.183 mEq/kG/Hr (125 mL/Hr) IV Continuous <Continuous>    MEDICATIONS  (PRN):  acetaminophen   Tablet. 650 milliGRAM(s) Oral every 6 hours PRN Moderate Pain (4 - 6)  acetaminophen 325 mG/butalbital 50 mG/caffeine 40 mG 1 Tablet(s) Oral every 6 hours PRN headache  oxyCODONE    5 mG/acetaminophen 325 mG 2 Tablet(s) Oral every 6 hours PRN Severe Pain (7 - 10)  polyethylene glycol 3350 17 Gram(s) Oral daily PRN Constipation    Vital Signs Last 24 Hrs  T(C): 37.3 (04 Sep 2018 04:08), Max: 37.3 (04 Sep 2018 04:08)  T(F): 99.2 (04 Sep 2018 04:08), Max: 99.2 (04 Sep 2018 04:08)  HR: 89 (04 Sep 2018 04:08) (87 - 112)  BP: 121/81 (04 Sep 2018 04:08) (121/81 - 151/91)  BP(mean): --  RR: 19 (03 Sep 2018 22:12) (18 - 19)  SpO2: 100% (03 Sep 2018 22:12) (100% - 100%)    Gen:  NAD  Pulm: nonlabored breathing, CTAB/L  CV: S1, S2, RRR  Abd:  soft, nontender, nondistended.  no flank tenderness.  Ext: no swelling, cyanosis or clubbing  Vasc: 2+ peripheral pulses throughout  Neuro: AACx3, no motor or sensory deficits    I&O's Detail    03 Sep 2018 07:01  -  04 Sep 2018 07:00  --------------------------------------------------------  IN:    sodium bicarbonate  Infusion: 1125 mL  Total IN: 1125 mL    OUT:    Voided: 860 mL  Total OUT: 860 mL    Total NET: 265 mL    LABS:                        10.9   4.7   )-----------( 112      ( 04 Sep 2018 08:26 )             34.2     09-03    140  |  99  |  49.0<H>  ----------------------------<  87  4.6   |  27.0  |  6.33<H>    Ca    8.8      03 Sep 2018 07:27  Phos  4.4     09-03  Mg     1.8     09-03                34 yo F h/o HTN p/w acute renal failure consulted for Tunneled CVC  placement. Patient continues to decline Access  placement with the hopes of renal function improvement. Patient was instructed to avoid getting blood drawn or IVs in Lt arm due to the possibility of a future intervention.    Plan:  - F/u eGFR,  - F/u HD Access,  - Restricted Left Arm order  - Continue treatment as per primary team

## 2018-09-04 NOTE — PROGRESS NOTE ADULT - ATTENDING COMMENTS
Patient seen and examined at the bedside. Agree with the above history, physical, assessment, and plan with the necessary amendments/elaborations below:    clinically stable. still in renal failure, unchanged, no worsening or improvement compared to prior days. asymptomatic.     Goals of Care discussed at length with the patient + a Physician from her [facility] who she has asked to be involved with her care decision making. This conversation included current condition, prognosis, and options for therapy. Discussed desires by patient for further care and wishes were expressed - after a lengthy conversation including risks and benefits of not pursuing HD at this time pt has decided to again defer HD. She states that she will be seeking second opinion with a nephrologist recommended by her Physician . She understands risks of potentially going into worsening renal failure while treatment is avoided and was made aware of complications and s/s to look for such as uremic encephalopathy as an example. She is awaiting results from her bx and lab w/u although it was made clear that etiology of the failure will NOT change the fact that she is currently in failure and needs intervention . Will discharge today. case discussed with nephro. Should she decide to continue fu with our nephrology group she was instructed to make an appt on this coming tuesday/wednesday for office visit and repeat labs. Family present at the bedside. Length of Conversation ~35min.

## 2018-09-04 NOTE — PROGRESS NOTE ADULT - NSHPATTENDINGPLANDISCUSS_GEN_ALL_CORE
Patient
the patient.  All imaging and results of lab/other studies reviewed by me. All questions answered to the satisfaction of the patient. At this time they agree with the current plan of therapy.

## 2018-09-04 NOTE — DISCHARGE NOTE ADULT - INSTRUCTIONS
You will need to carefully plan your meals and keep track of the amount of liquids you eat and drink. It helps to limit or avoid foods and beverages that have lots of: potassium, phosphorus, sodium—for example, vegetable, juice (orange)  and sports drinks.  You need to eat high quality proteins from  meat, poultry, fish, and eggs.  Avoid  canned, packaged, frozen, and fast foods.

## 2018-09-04 NOTE — DISCHARGE NOTE ADULT - PATIENT PORTAL LINK FT
You can access the [facility] Patient Portal, offered by [facility], by registering with the following website: [email or web address]

## 2018-09-04 NOTE — DISCHARGE NOTE ADULT - MEDICATION SUMMARY - MEDICATIONS TO TAKE
I will START or STAY ON the medications listed below when I get home from the hospital:    butalbital/acetaminophen/caffeine 50 mg-325 mg-40 mg oral tablet  -- 1 tab(s) by mouth every 6 hours   -- Indication: For Migraine Headaches    carvedilol 25 mg oral tablet  -- 1 tab(s) by mouth every 12 hours  -- Indication: For High Blood Pressure    amLODIPine 10 mg oral tablet  -- 1 tab(s) by mouth once a day  -- Indication: For High Blood Pressure    FeroSul 325 mg (65 mg elemental iron) oral tablet  -- 1 tab(s) by mouth once a day   -- Indication: For Anemia     Vitamin C 250 mg oral tablet  -- 1 tab(s) by mouth once a day , with your Iron pills  -- Indication: For supplement for iron absorption    Vitamin D2 50,000 intl units (1.25 mg) oral capsule  -- 1 cap(s) by mouth once a week   -- Indication: For supplement I will START or STAY ON the medications listed below when I get home from the hospital:    butalbital/acetaminophen/caffeine 50 mg-325 mg-40 mg oral tablet  -- 1 tab(s) by mouth every 6 hours   -- Indication: For Migraine Headaches    carvedilol 25 mg oral tablet  -- 1 tab(s) by mouth every 12 hours  -- Indication: For High Blood Pressure    amLODIPine 10 mg oral tablet  -- 1 tab(s) by mouth once a day  -- Indication: For High Blood Pressure    FeroSul 325 mg (65 mg elemental iron) oral tablet  -- 1 tab(s) by mouth once a day   -- Indication: For Anemia     docusate sodium 100 mg oral capsule  -- 1 cap(s) by mouth 2 times a day Stop if you have diarrhea  -- Indication: For To help you with your bowel movements    calcitriol 0.5 mcg oral capsule  -- 1 cap(s) by mouth once a day  -- Indication: For To help maintain your calcium levels    Vitamin D2 50,000 intl units (1.25 mg) oral capsule  -- 1 cap(s) by mouth once a week   -- Indication: For supplement    Vitamin C 250 mg oral tablet  -- 1 tab(s) by mouth once a day , with your Iron pills  -- Indication: For supplement for iron absorption

## 2018-09-04 NOTE — PROGRESS NOTE ADULT - ASSESSMENT
33 years old female with Hx of recently diagnosed hypertension and migraines was treated with Propanolol for two weeks and thereafter prescribed lisinopril due to intolerance of propranolol as well as incidental finding of elevated prolactin level on labs with noted cessation of menstruation since April 2018; pt was referred from [facility] due to elevated creatinine, with concern for acute vs acute on chronic kidney failure unclear etiology.     Renal US shows medical renal disease which is probably CKD. Admission was complicated with hemorrhage posterior to renal biopsy, which required transfusion of 2 units of pRBCs with subsequent improvement on Hb and hemodynamic state.    The patient has shown no improvement on her renal function since admission despite hydration and appropriate management. The patient refuses to HD and catheter placement.  Patient is currently clinically stable and will probably be discharge within 24 hours with recommendations to follow with Nephrology for further management.     Acute on chronic kidney disease  - Sustained, pt asymptomatic  - BUN:Cr ratio 8.29  - BUN/cr with slight down trend, but Cr sustained on 6.  -Nephrotic range proteinuria; 142/19 = 7.47gm  - Renal doppler US -- > no signs of stenosis, US showing no changes compared to admission.  - ESR elevated   -CRP wnl   - C3 mildly decreased discussed with dr. Littlejohn who recommended to hold corticosteroid therapy for now waiting for the other workup ordered.  - c/w IVF   - c/w monitoring renal function   - Hyperkalemia resolved.  -F/u ALBA, metanephrines, ds DNA  -F/U cbc and cmp in am  -PTH elevated, decreased Vit D -->Indicates ckd      -Started on Calcitriol 0.5 mg qd by Nephro   - Hold nephrotoxic medications   - hepatitis panel negative  - Nephrology consul noted and appreciated    S/p Renal Biopsy  -complicated with perirenal hemorrhage -->resolving, asymptomatic  -s/p transfusion of 2 units of pRBCs  -C/w pain control with Tylenol 650 mg q6 for mild pain and Percocet 5/350 mg q6 for moderate-severe pain.  -F/u Bx results -->Probably Tuesday/ Wednesday     Hypertension  - Stable  - Holding Lisinopril at the moment due to LAITH   - C/W Amlodipine 10 mg PO and titrate as needed.    Acute on Chronic Anemia of chronic disease   - Low TIBC, with low-normal Iron, ferritin and transferrin    - h/h decreasing secondary to biopsy hemorrhage  - Transfuse 2 units pRBC  - Follow up cbc on am   - given slightly lower ferritin could benefit from iron therapy   - Normocytic normochromic  - c/w ferrous home sulfate 325mg po qd   - s/p epogen x 1 dose as per nephro     Amenorrhea  - Likely secondary to hyperprolactinemia  - Will trend LH, FSH and prolactin - labs testing   - MRI brain with no evidence of pituitary adenoma - recommends dynamic pituitary study with contrast but pt is not able to receive contrast due to LAITH.   - Will consult endocrinology     Migraine   - Tylenol is not sufficient  - Started Fiorcet PRN    DVT prophylaxis  - Given hemorrhage, hold A/C  - Pt is ambulating   - scd boots b/l     Dispo: When pt is medically stable to return to home. Based on renal functioning and if worsens need for HD may need community HD center. 33 years old female with Hx of recently diagnosed hypertension and migraines was treated with Propanolol for two weeks and thereafter prescribed lisinopril due to intolerance of propranolol as well as incidental finding of elevated prolactin level on labs with noted cessation of menstruation since April 2018; pt was referred from [facility] due to elevated creatinine, with concern for acute vs acute on chronic kidney failure unclear etiology.     Renal US shows medical renal disease which is probably CKD. Admission was complicated with hemorrhage posterior to renal biopsy, which required transfusion of 2 units of pRBCs with subsequent improvement on Hb and hemodynamic state.    The patient has shown no improvement on her renal function since admission despite hydration and appropriate management. The patient refuses to HD and catheter placement.  Patient is currently clinically stable and will probably be discharge within 24 hours with recommendations to follow with Nephrology for further management.     Acute on chronic kidney disease  - Sustained, pt asymptomatic  - BUN:Cr ratio 8.29  - BUN/cr with slight down trend, but Cr sustained on 6.  -Nephrotic range proteinuria; 142/19 = 7.47gm  - Renal doppler US -- > no signs of stenosis, US showing no changes compared to admission.  - ESR elevated   -CRP wnl   - C3 mildly decreased discussed with dr. Littlejohn who recommended to hold corticosteroid therapy for now waiting for the other workup ordered.  - c/w IVF   - c/w monitoring renal function   - Hyperkalemia resolved.  -F/u ALBA, metanephrines, ds DNA  -F/U cbc and cmp in am  -PTH elevated, decreased Vit D -->Indicates ckd      -Started on Calcitriol 0.5 mg qd by Nephro   - Hold nephrotoxic medications   - hepatitis panel negative  - Nephrology consul noted and appreciated    S/p Renal Biopsy  -complicated with perirenal hemorrhage -->resolving, asymptomatic  -s/p transfusion of 2 units of pRBCs  -C/w pain control with Tylenol 650 mg q6 for mild pain and Percocet 5/350 mg q6 for moderate-severe pain.  -F/u Bx results -->Probably Tuesday/ Wednesday     Hypertension  - Stable  - Holding Lisinopril at the moment due to LAITH   - C/W Amlodipine 10 mg PO and titrate as needed.  -Started on Clonidine 0.1 mg q4 hours PRNSBP >160 --->As per nephro	  Started on Carvedilol 25 mg PO q12 hours -->as per nephro    Acute on Chronic Anemia of chronic disease   - Low TIBC, with low-normal Iron, ferritin and transferrin    - h/h decreasing secondary to biopsy hemorrhage  - Transfuse 2 units pRBC  - Follow up cbc on am   - given slightly lower ferritin could benefit from iron therapy   - Normocytic normochromic  - c/w ferrous home sulfate 325mg po qd   - s/p epogen x 1 dose as per nephro     Amenorrhea  - Likely secondary to hyperprolactinemia  - Will trend LH, FSH and prolactin - labs testing   - MRI brain with no evidence of pituitary adenoma - recommends dynamic pituitary study with contrast but pt is not able to receive contrast due to LAITH.   - Will consult endocrinology     Migraine   - Tylenol is not sufficient  - Started Fiorcet PRN    DVT prophylaxis  - Given hemorrhage, hold A/C  - Pt is ambulating   - scd boots b/l     Dispo: When pt is medically stable to return to home. Based on renal functioning and if worsens need for HD may need community HD center.

## 2018-09-04 NOTE — DISCHARGE NOTE ADULT - PLAN OF CARE
stable. You were found to have higher than normal creatinine levels. Creatinine is a substance given an idea of how the kidney is working. A normal value for creatinine is under 1, you were found to have 6.3, which was sustained over time.  Other tests done on you determined that your kidneys have been declining in function for a long period of time and currently your Kidneys are working at a 10% of their capacity which can cause accumulation of waste substances in your body and may require dialysis to be excreted.   A renal biopsy was performed in order to establish the cause of you renal disease, the results are still pending. You need to follow up with Nephrology.  If after your discharge you feel confused, noticed changes in your personality, have problem concentrating, notice easy bruising, difficulty breathing, are lethargic/ drowsy or if a member of your family finds you unresponsive, YOU MUST come back to the hospital because It can be a consequence of increased levels of toxic substances in your blood as a consequence of your kidneys not working adequately.     You need to follow up with nephrology within 1 week from your discharge for further management of you chronic kidney disease and follow up of the blood work and biopsy performed while you were admitted.     Please follow up with your primary care doctor within 1 week from your discharge. to keep under 130/80 mmHg Continue taking Amlodipine as prescribed to prevent your blood pressure from rising. Follow up with nephrology and primary care doctor within 1 week from your discharge. to prevent further decrease of the hemoglobin You were found have anemia when admitted. Your anemia was severe and you needed to be transfused with 2 packs of red blood cells in order to increase your hemoglobin. Hemoglobin is a protein that transports oxygen in your blood, if it is low it can cause damage to your brain, heart, lungs, kidney and every other organ in your body. Due to the chronic kidney disease you are no producing enough red blood cells, and you received and injections of a hormone to help you increase the production of red blood cells.  You need to Follow up with nephrology and primary care doctor within 1 week from your discharge. You were found to have increased levels of a hormone called Prolactin which is causing your periods to stop. The increase in prolactin can be related to the chronic kidney disease you have or to other cause. A MRI of you brain was performed but it did not showed any lesions in your brain that may explain the elevation in prolactin. You need to follow up with your primary care doctor within 1 week from your discharge for further management. You have a history of migraines. Please take Tylenol as prescribed and follow up with your primary care doctor for further management.    Should your symptoms persist for a long time or worsen, call your Primary Care Doctor or come to the ER. If you notice you are having more than 8 episodes of headache or related symptoms (visual disturbances, nausea, numbness/tingling), we recommend you see a neurologist. Continue taking your medications as prescribed to prevent your blood pressure from rising. Follow up with nephrology and primary care doctor within 1 week from your discharge. You were found have anemia when admitted. Your anemia was severe and you needed to be transfused with 2 units of red blood cells in order to increase your hemoglobin. Hemoglobin is a protein that transports oxygen in your blood, if it is low it can cause damage to your brain, heart, lungs, kidney and every other organ in your body. Due to the chronic kidney disease you are no producing enough red blood cells, and you received and injections of a hormone to help you increase the production of red blood cells.  You need to Follow up with nephrology and primary care doctor within 1 week from your discharge. You have a history of migraines. Please take medications as prescribed and follow up with your primary care doctor.    Should your symptoms persist for a long time or worsen, call your Primary Care Doctor or come to the ER. If you notice you are having more than 8 episodes of headache or related symptoms (visual disturbances, nausea, numbness/tingling), we recommend you see a neurologist. Continue taking your medications as prescribed to prevent your blood pressure from rising.   Follow up with your primary care doctor for a repeat of BP within 2-3 days after discharge.  Follow up with nephrology within 1 week of discharge from the hospital You were found have anemia when admitted. Your anemia was severe and you needed to be transfused with 2 units of red blood cells in order to increase your hemoglobin. Hemoglobin is a protein that transports oxygen in your blood, if it is low it can cause damage to your brain, heart, lungs, kidney and every other organ in your body. Due to the chronic kidney disease you are no producing enough red blood cells, and you received and injections of a hormone to help you increase the production of red blood cells.  Take iron pills as prescribed, with vitamin C You were found to have higher than normal creatinine levels. Creatinine is a substance given an idea of how the kidney is working. A normal value for creatinine is under 1, you were found to have 6.3, which was sustained over time.  Other tests done on you determined that your kidneys have been declining in function for a long period of time and currently your Kidneys are working at a 10% of their capacity which can cause accumulation of waste substances in your body and may require dialysis to be excreted.   A renal biopsy was performed in order to establish the cause of you renal disease, the results are still pending. You need to follow up with Nephrology.  If after your discharge you feel confused, noticed changes in your personality, have problem concentrating, notice easy bruising, difficulty breathing, are lethargic/ drowsy or if a member of your family finds you unresponsive, YOU MUST come back to the hospital because It can be a consequence of increased levels of toxic substances in your blood as a consequence of your kidneys not working adequately.     You need to follow up with nephrology within 7 days from your discharge for further management of you chronic kidney disease and follow up of the blood work and biopsy performed while you were admitted.     Please follow up with your primary care doctor within 2 days  from your discharge. Continue taking your medications as prescribed to prevent your blood pressure from rising.   Follow up with your primary care doctor for a repeat of BP within 2-3 days after discharge.  Follow up with nephrology within 7 days of discharge from the hospital You were found to have increased levels of a hormone called Prolactin which is causing your periods to stop. The increase in prolactin can be related to the chronic kidney disease you have or to other cause. A MRI of you brain was performed but it did not showed any lesions in your brain that may explain the elevation in prolactin. You need to follow up with your primary care doctor within 2 days from your discharge for further management.

## 2018-09-05 LAB
ANA PAT FLD IF-IMP: ABNORMAL
ANA PAT FLD IF-IMP: ABNORMAL
ANA TITR SER: ABNORMAL
ANA TITR SER: ABNORMAL
AUTO DIFF PNL BLD: ABNORMAL
AUTO DIFF PNL BLD: ABNORMAL
C5 SERPL-MCNC: 13.1 MG/DL — SIGNIFICANT CHANGE UP (ref 10.6–26.3)
METANEPH UR-MCNC: SIGNIFICANT CHANGE UP

## 2018-09-06 LAB — SURGICAL PATHOLOGY STUDY: SIGNIFICANT CHANGE UP

## 2018-09-14 NOTE — ED PROVIDER NOTE - PHYSICAL EXAMINATION
Const: Awake, alert and oriented. In no acute distress. Well appearing.  HEENT: NC/AT. Moist mucous membranes.  Eyes: No scleral icterus. EOMI.  Neck:. Soft and supple. Full ROM without pain.  Cardiac: +S1/S2. No murmurs.   Resp: Speaking in full sentences. No evidence of respiratory distress. No wheezes, rales or rhonchi.  Abd: Soft, non-tender, non-distended. Normal bowel sounds in all 4 quadrants. No guarding or rebound.  Back: Spine midline and non-tender. No CVAT.  Skin: No rashes, abrasions or lacerations.  Neuro: Awake, alert & oriented x 3. Moves all extremities symmetrically.
melissa more

## 2018-09-17 PROBLEM — Z00.00 ENCOUNTER FOR PREVENTIVE HEALTH EXAMINATION: Status: ACTIVE | Noted: 2018-09-17

## 2018-09-17 PROBLEM — G43.009 MIGRAINE WITHOUT AURA, NOT INTRACTABLE, WITHOUT STATUS MIGRAINOSUS: Chronic | Status: ACTIVE | Noted: 2018-08-31

## 2018-09-17 PROBLEM — I10 ESSENTIAL (PRIMARY) HYPERTENSION: Chronic | Status: ACTIVE | Noted: 2018-08-31

## 2018-09-18 ENCOUNTER — INPATIENT (INPATIENT)
Facility: HOSPITAL | Age: 33
LOS: 10 days | Discharge: HOME | DRG: 673 | End: 2018-09-29
Attending: INTERNAL MEDICINE | Admitting: INTERNAL MEDICINE
Payer: MEDICAID

## 2018-09-18 VITALS — WEIGHT: 110.01 LBS | HEIGHT: 64 IN

## 2018-09-18 DIAGNOSIS — Z98.51 TUBAL LIGATION STATUS: Chronic | ICD-10-CM

## 2018-09-18 DIAGNOSIS — N19 UNSPECIFIED KIDNEY FAILURE: ICD-10-CM

## 2018-09-18 LAB
ALBUMIN SERPL ELPH-MCNC: 4 G/DL — SIGNIFICANT CHANGE UP (ref 3.3–5.2)
ALP SERPL-CCNC: 77 U/L — SIGNIFICANT CHANGE UP (ref 40–120)
ALT FLD-CCNC: 24 U/L — SIGNIFICANT CHANGE UP
ANION GAP SERPL CALC-SCNC: 15 MMOL/L — SIGNIFICANT CHANGE UP (ref 5–17)
ANION GAP SERPL CALC-SCNC: 16 MMOL/L — SIGNIFICANT CHANGE UP (ref 5–17)
APPEARANCE UR: CLEAR — SIGNIFICANT CHANGE UP
AST SERPL-CCNC: 12 U/L — SIGNIFICANT CHANGE UP
BASOPHILS # BLD AUTO: 0 K/UL — SIGNIFICANT CHANGE UP (ref 0–0.2)
BASOPHILS NFR BLD AUTO: 0.3 % — SIGNIFICANT CHANGE UP (ref 0–2)
BILIRUB SERPL-MCNC: 0.6 MG/DL — SIGNIFICANT CHANGE UP (ref 0.4–2)
BILIRUB UR-MCNC: NEGATIVE — SIGNIFICANT CHANGE UP
BUN SERPL-MCNC: 89 MG/DL — HIGH (ref 8–20)
BUN SERPL-MCNC: 90 MG/DL — HIGH (ref 8–20)
CALCIUM SERPL-MCNC: 9.1 MG/DL — SIGNIFICANT CHANGE UP (ref 8.6–10.2)
CALCIUM SERPL-MCNC: 9.3 MG/DL — SIGNIFICANT CHANGE UP (ref 8.6–10.2)
CHLORIDE SERPL-SCNC: 102 MMOL/L — SIGNIFICANT CHANGE UP (ref 98–107)
CHLORIDE SERPL-SCNC: 105 MMOL/L — SIGNIFICANT CHANGE UP (ref 98–107)
CO2 SERPL-SCNC: 16 MMOL/L — LOW (ref 22–29)
CO2 SERPL-SCNC: 23 MMOL/L — SIGNIFICANT CHANGE UP (ref 22–29)
COLOR SPEC: YELLOW — SIGNIFICANT CHANGE UP
CREAT SERPL-MCNC: 7.39 MG/DL — HIGH (ref 0.5–1.3)
CREAT SERPL-MCNC: 7.49 MG/DL — HIGH (ref 0.5–1.3)
DIFF PNL FLD: ABNORMAL
EOSINOPHIL # BLD AUTO: 0 K/UL — SIGNIFICANT CHANGE UP (ref 0–0.5)
EOSINOPHIL NFR BLD AUTO: 1 % — SIGNIFICANT CHANGE UP (ref 0–6)
EPI CELLS # UR: SIGNIFICANT CHANGE UP
GLUCOSE SERPL-MCNC: 101 MG/DL — SIGNIFICANT CHANGE UP (ref 70–115)
GLUCOSE SERPL-MCNC: 105 MG/DL — SIGNIFICANT CHANGE UP (ref 70–115)
GLUCOSE UR QL: NEGATIVE MG/DL — SIGNIFICANT CHANGE UP
HCG UR QL: NEGATIVE — SIGNIFICANT CHANGE UP
HCT VFR BLD CALC: 32.8 % — LOW (ref 37–47)
HGB BLD-MCNC: 10.7 G/DL — LOW (ref 12–16)
KETONES UR-MCNC: NEGATIVE — SIGNIFICANT CHANGE UP
LEUKOCYTE ESTERASE UR-ACNC: NEGATIVE — SIGNIFICANT CHANGE UP
LYMPHOCYTES # BLD AUTO: 0.9 K/UL — LOW (ref 1–4.8)
LYMPHOCYTES # BLD AUTO: 28.7 % — SIGNIFICANT CHANGE UP (ref 20–55)
MAGNESIUM SERPL-MCNC: 2.5 MG/DL — SIGNIFICANT CHANGE UP (ref 1.6–2.6)
MCHC RBC-ENTMCNC: 27 PG — SIGNIFICANT CHANGE UP (ref 27–31)
MCHC RBC-ENTMCNC: 32.6 G/DL — SIGNIFICANT CHANGE UP (ref 32–36)
MCV RBC AUTO: 82.8 FL — SIGNIFICANT CHANGE UP (ref 81–99)
MONOCYTES # BLD AUTO: 0.4 K/UL — SIGNIFICANT CHANGE UP (ref 0–0.8)
MONOCYTES NFR BLD AUTO: 14.2 % — HIGH (ref 3–10)
NEUTROPHILS # BLD AUTO: 1.7 K/UL — LOW (ref 1.8–8)
NEUTROPHILS NFR BLD AUTO: 55.5 % — SIGNIFICANT CHANGE UP (ref 37–73)
NITRITE UR-MCNC: NEGATIVE — SIGNIFICANT CHANGE UP
PH UR: 6 — SIGNIFICANT CHANGE UP (ref 5–8)
PHOSPHATE SERPL-MCNC: 6.6 MG/DL — HIGH (ref 2.4–4.7)
PLATELET # BLD AUTO: 241 K/UL — SIGNIFICANT CHANGE UP (ref 150–400)
POTASSIUM SERPL-MCNC: 4 MMOL/L — SIGNIFICANT CHANGE UP (ref 3.5–5.3)
POTASSIUM SERPL-MCNC: 5.7 MMOL/L — HIGH (ref 3.5–5.3)
POTASSIUM SERPL-SCNC: 4 MMOL/L — SIGNIFICANT CHANGE UP (ref 3.5–5.3)
POTASSIUM SERPL-SCNC: 5.7 MMOL/L — HIGH (ref 3.5–5.3)
PROT SERPL-MCNC: 8 G/DL — SIGNIFICANT CHANGE UP (ref 6.6–8.7)
PROT UR-MCNC: 500 MG/DL
RBC # BLD: 3.96 M/UL — LOW (ref 4.4–5.2)
RBC # FLD: 14.6 % — SIGNIFICANT CHANGE UP (ref 11–15.6)
RBC CASTS # UR COMP ASSIST: ABNORMAL /HPF (ref 0–4)
SODIUM SERPL-SCNC: 136 MMOL/L — SIGNIFICANT CHANGE UP (ref 135–145)
SODIUM SERPL-SCNC: 141 MMOL/L — SIGNIFICANT CHANGE UP (ref 135–145)
SP GR SPEC: 1.01 — SIGNIFICANT CHANGE UP (ref 1.01–1.02)
UROBILINOGEN FLD QL: NEGATIVE MG/DL — SIGNIFICANT CHANGE UP
WBC # BLD: 3.1 K/UL — LOW (ref 4.8–10.8)
WBC # FLD AUTO: 3.1 K/UL — LOW (ref 4.8–10.8)
WBC UR QL: SIGNIFICANT CHANGE UP

## 2018-09-18 PROCEDURE — 93010 ELECTROCARDIOGRAM REPORT: CPT

## 2018-09-18 PROCEDURE — 71046 X-RAY EXAM CHEST 2 VIEWS: CPT | Mod: 26

## 2018-09-18 PROCEDURE — 99223 1ST HOSP IP/OBS HIGH 75: CPT

## 2018-09-18 PROCEDURE — 99223 1ST HOSP IP/OBS HIGH 75: CPT | Mod: GC

## 2018-09-18 PROCEDURE — 99285 EMERGENCY DEPT VISIT HI MDM: CPT

## 2018-09-18 RX ORDER — ONDANSETRON 8 MG/1
1 TABLET, FILM COATED ORAL
Qty: 6 | Refills: 0 | OUTPATIENT
Start: 2018-09-18 | End: 2018-09-19

## 2018-09-18 RX ORDER — CARVEDILOL PHOSPHATE 80 MG/1
25 CAPSULE, EXTENDED RELEASE ORAL EVERY 12 HOURS
Qty: 0 | Refills: 0 | Status: DISCONTINUED | OUTPATIENT
Start: 2018-09-18 | End: 2018-09-28

## 2018-09-18 RX ORDER — OXYCODONE HYDROCHLORIDE 5 MG/1
1 TABLET ORAL
Qty: 8 | Refills: 0 | OUTPATIENT
Start: 2018-09-18 | End: 2018-09-19

## 2018-09-18 RX ORDER — SODIUM BICARBONATE 1 MEQ/ML
0.23 SYRINGE (ML) INTRAVENOUS
Qty: 150 | Refills: 0 | Status: DISCONTINUED | OUTPATIENT
Start: 2018-09-18 | End: 2018-09-20

## 2018-09-18 RX ORDER — ONDANSETRON 8 MG/1
4 TABLET, FILM COATED ORAL EVERY 8 HOURS
Qty: 0 | Refills: 0 | Status: DISCONTINUED | OUTPATIENT
Start: 2018-09-18 | End: 2018-09-28

## 2018-09-18 RX ORDER — AMLODIPINE BESYLATE 2.5 MG/1
10 TABLET ORAL DAILY
Qty: 0 | Refills: 0 | Status: DISCONTINUED | OUTPATIENT
Start: 2018-09-19 | End: 2018-09-25

## 2018-09-18 RX ORDER — INFLUENZA VIRUS VACCINE 15; 15; 15; 15 UG/.5ML; UG/.5ML; UG/.5ML; UG/.5ML
0.5 SUSPENSION INTRAMUSCULAR ONCE
Qty: 0 | Refills: 0 | Status: DISCONTINUED | OUTPATIENT
Start: 2018-09-18 | End: 2018-09-29

## 2018-09-18 RX ORDER — FERROUS SULFATE 325(65) MG
325 TABLET ORAL DAILY
Qty: 0 | Refills: 0 | Status: DISCONTINUED | OUTPATIENT
Start: 2018-09-18 | End: 2018-09-19

## 2018-09-18 RX ORDER — FUROSEMIDE 40 MG
40 TABLET ORAL ONCE
Qty: 0 | Refills: 0 | Status: COMPLETED | OUTPATIENT
Start: 2018-09-18 | End: 2018-09-18

## 2018-09-18 RX ORDER — CALCITRIOL 0.5 UG/1
0.5 CAPSULE ORAL DAILY
Qty: 0 | Refills: 0 | Status: DISCONTINUED | OUTPATIENT
Start: 2018-09-18 | End: 2018-09-28

## 2018-09-18 RX ORDER — ERGOCALCIFEROL 1.25 MG/1
50000 CAPSULE ORAL
Qty: 0 | Refills: 0 | Status: DISCONTINUED | OUTPATIENT
Start: 2018-09-18 | End: 2018-09-28

## 2018-09-18 RX ORDER — ACETAMINOPHEN 500 MG
650 TABLET ORAL EVERY 6 HOURS
Qty: 0 | Refills: 0 | Status: DISCONTINUED | OUTPATIENT
Start: 2018-09-18 | End: 2018-09-28

## 2018-09-18 RX ORDER — SODIUM BICARBONATE 1 MEQ/ML
50 SYRINGE (ML) INTRAVENOUS ONCE
Qty: 0 | Refills: 0 | Status: COMPLETED | OUTPATIENT
Start: 2018-09-18 | End: 2018-09-18

## 2018-09-18 RX ORDER — ASCORBIC ACID 60 MG
250 TABLET,CHEWABLE ORAL DAILY
Qty: 0 | Refills: 0 | Status: DISCONTINUED | OUTPATIENT
Start: 2018-09-18 | End: 2018-09-24

## 2018-09-18 RX ORDER — DOCUSATE SODIUM 100 MG
100 CAPSULE ORAL
Qty: 0 | Refills: 0 | Status: DISCONTINUED | OUTPATIENT
Start: 2018-09-18 | End: 2018-09-23

## 2018-09-18 RX ORDER — SODIUM POLYSTYRENE SULFONATE 4.1 MEQ/G
30 POWDER, FOR SUSPENSION ORAL ONCE
Qty: 0 | Refills: 0 | Status: COMPLETED | OUTPATIENT
Start: 2018-09-18 | End: 2018-09-18

## 2018-09-18 RX ORDER — INSULIN HUMAN 100 [IU]/ML
6 INJECTION, SOLUTION SUBCUTANEOUS ONCE
Qty: 0 | Refills: 0 | Status: DISCONTINUED | OUTPATIENT
Start: 2018-09-18 | End: 2018-09-18

## 2018-09-18 RX ORDER — HEPARIN SODIUM 5000 [USP'U]/ML
5000 INJECTION INTRAVENOUS; SUBCUTANEOUS EVERY 12 HOURS
Qty: 0 | Refills: 0 | Status: DISCONTINUED | OUTPATIENT
Start: 2018-09-18 | End: 2018-09-27

## 2018-09-18 RX ORDER — DEXTROSE 50 % IN WATER 50 %
50 SYRINGE (ML) INTRAVENOUS ONCE
Qty: 0 | Refills: 0 | Status: DISCONTINUED | OUTPATIENT
Start: 2018-09-18 | End: 2018-09-18

## 2018-09-18 RX ADMIN — SODIUM POLYSTYRENE SULFONATE 30 GRAM(S): 4.1 POWDER, FOR SUSPENSION ORAL at 21:58

## 2018-09-18 RX ADMIN — CARVEDILOL PHOSPHATE 25 MILLIGRAM(S): 80 CAPSULE, EXTENDED RELEASE ORAL at 18:07

## 2018-09-18 RX ADMIN — ERGOCALCIFEROL 50000 UNIT(S): 1.25 CAPSULE ORAL at 18:05

## 2018-09-18 RX ADMIN — Medication 75 MEQ/KG/HR: at 15:26

## 2018-09-18 RX ADMIN — Medication 40 MILLIGRAM(S): at 15:25

## 2018-09-18 RX ADMIN — Medication 250 MILLIGRAM(S): at 18:05

## 2018-09-18 RX ADMIN — Medication 50 MILLIEQUIVALENT(S): at 15:26

## 2018-09-18 RX ADMIN — HEPARIN SODIUM 5000 UNIT(S): 5000 INJECTION INTRAVENOUS; SUBCUTANEOUS at 18:07

## 2018-09-18 RX ADMIN — Medication 325 MILLIGRAM(S): at 18:06

## 2018-09-18 RX ADMIN — Medication 100 MILLIGRAM(S): at 18:10

## 2018-09-18 NOTE — CONSULT NOTE ADULT - ASSESSMENT
Dx : Advanced CKD - Uremia,    P : Urgent start PD,    Eventual LRD - Transplant,    DIvisW Dr. Schultz,

## 2018-09-18 NOTE — CONSULT NOTE ADULT - SUBJECTIVE AND OBJECTIVE BOX
[facility] DIVISION OF KIDNEY DISEASES AND HYPERTENSION -- INITIAL CONSULT NOTE  --------------------------------------------------------------------------------  HPI:  34 yo Sp. Female  with CKD  stage 5,  not on HD, Migraine,  HTN - Stage 2 , presenting with pedal edema up to 1/3 of distal lower extremities b/l, and  palpebral b/l edema for the last 3 days, associated with nausea. The edema is predominant in early morning and decreases gradually during the day.     Patient denies chest pain, palpitations, SOB, fevers, chills, vomiting, abdominal pain.     PAST HISTORY  --------------------------------------------------------------------------------  PAST MEDICAL & SURGICAL HISTORY:  Hyperprolactinemia  Stage 5 chronic kidney disease not on chronic dialysis  Hypertension, unspecified type  Migraine without aura, not intractable, without status migraine,    History of bilateral tubal ligation    FAMILY HISTORY:  Family history of renal failure syndrome (Uncle)  Family history of hypertension (Father)    PAST SOCIAL HISTORY:    ALLERGIES & MEDICATIONS  --------------------------------------------------------------------------------  Allergies    No Known Allergies    Standing Inpatient Medications  amLODIPine   Tablet 10 milliGRAM(s) Oral daily  ascorbic acid  Oral Tab/Cap - Peds 250 milliGRAM(s) Oral daily  calcitriol   Capsule 0.5 MICROGram(s) Oral daily  carvedilol 25 milliGRAM(s) Oral every 12 hours  docusate sodium 100 milliGRAM(s) Oral two times a day  ergocalciferol 09813 Unit(s) Oral every week  ferrous sulfate Oral Tab/Cap - Peds 325 milliGRAM(s) Oral daily  heparin  Injectable 5000 Unit(s) SubCutaneous every 12 hours  influenza   Vaccine 0.5 milliLiter(s) IntraMuscular once  sodium bicarbonate  Infusion 0.225 mEq/kG/Hr IV Continuous <Continuous>    PRN Inpatient Medications  acetaminophen   Tablet .. 650 milliGRAM(s) Oral every 6 hours PRN  ondansetron    Tablet 4 milliGRAM(s) Oral every 8 hours PRN      REVIEW OF SYSTEMS  --------------------------------------------------------------------------------  Gen: No weight changes, fatigue, fevers/chills, weakness  Skin: No rashes  Head/Eyes/Ears/Mouth: No headache; Normal hearing; Normal vision w/o blurriness; No sinus pain/discomfort, sore throat  Respiratory: No dyspnea, cough, wheezing, hemoptysis  CV: No chest pain, PND, orthopnea  GI: No abdominal pain, diarrhea, constipation, + nausea, No vomiting, melena, hematochezia  : No increased frequency, dysuria, hematuria, nocturia  MSK: No joint pain/swelling; no back pain; no edema  Neuro: No dizziness/lightheadedness, weakness, seizures, numbness, tingling  Heme: No easy bruising or bleeding  Endo: No heat/cold intolerance  Psych: No significant nervousness, anxiety, stress, depression    All other systems were reviewed and are negative, except as noted.    VITALS/PHYSICAL EXAM  --------------------------------------------------------------------------------  T(C): 36.5 (09-19-18 @ 04:25), Max: 36.9 (09-19-18 @ 00:28)  HR: 67 (09-19-18 @ 04:25) (66 - 74)  BP: 109/65 (09-19-18 @ 04:25) (109/65 - 136/72)  RR: 19 (09-19-18 @ 04:25) (16 - 20)  SpO2: 100% (09-19-18 @ 04:25) (100% - 100%)  Wt(kg): --  Height (cm): 162.56 (09-18-18 @ 12:01)  Weight (kg): 49.9 (09-18-18 @ 12:01)  BMI (kg/m2): 18.9 (09-18-18 @ 12:01)  BSA (m2): 1.52 (09-18-18 @ 12:01)      Physical Exam:  	Gen: NAD, pale, ill-appearing  	HEENT: PERRL, supple neck, clear oropharynx  	Pulm: CTA B/L  	CV: RRR, S1S2; no rub  	Back: No spinal or CVA tenderness; no sacral edema  	Abd: +BS, soft, nontender/nondistended  	: No suprapubic tenderness  	UE: Warm, FROM, no clubbing, intact strength; no edema; no asterixis  	LE: Warm, FROM, no clubbing, intact strength; 3 +  edema  	Neuro: No focal deficits, intact gait  	Psych: Normal affect and mood  	Skin: Warm, without rashes  	Vascular access: NA,    LABS/STUDIES  --------------------------------------------------------------------------------              10.7   3.1   >-----------<  241      [09-18-18 @ 13:08]              32.8     141  |  102  |  89.0  ----------------------------<  105      [09-18-18 @ 22:48]  4.0   |  23.0  |  7.39        Ca     9.1     [09-18-18 @ 22:48]      Mg     2.5     [09-18-18 @ 13:08]      Phos  6.6     [09-18-18 @ 13:08]    TPro  8.0  /  Alb  4.0  /  TBili  0.6  /  DBili  x   /  AST  12  /  ALT  24  /  AlkPhos  77  [09-18-18 @ 13:08]    Creatinine Trend:  SCr 7.39 [09-18 @ 22:48]  SCr 7.49 [09-18 @ 13:08]  SCr 6.31 [09-04 @ 08:26]  SCr 6.33 [09-03 @ 07:27]  SCr 6.39 [09-02 @ 01:18]    Urinalysis - [09-18-18 @ 14:01]      Color Yellow / Appearance Clear / SG 1.010 / pH 6.0      Gluc Negative / Ketone Negative  / Bili Negative / Urobili Negative       Blood Moderate / Protein 500 / Leuk Est Negative / Nitrite Negative      RBC 3-5 / WBC 3-5 / Hyaline  / Gran  / Sq Epi  / Non Sq Epi Occasional / Bacteria     Iron 45, TIBC 209, %sat 19      [08-31-18 @ 06:22]  Ferritin 58      [08-31-18 @ 06:22]  PTH -- (Ca 8.8)      [09-02-18 @ 14:18]   206  PTH -- (Ca 8.8)      [09-01-18 @ 19:52]   181  HbA1c 5.2      [08-31-18 @ 06:22]  TSH 2.15      [08-31-18 @ 06:22]  Lipid: chol 97, TG 66, HDL 39, LDL 45      [08-31-18 @ 06:22]    HBsAb Nonreact      [09-01-18 @ 20:51]  HBsAg Nonreact      [08-31-18 @ 18:18]  HBcAb Nonreact      [08-31-18 @ 18:18]  HCV 0.20, Nonreact      [08-31-18 @ 18:18]    ALBA: titer 1:160, pattern Homogeneous      [09-01-18 @ 20:54]  dsDNA 33      [09-01-18 @ 20:54]  C3 Complement 73      [09-01-18 @ 20:12]  C4 Complement 17      [09-01-18 @ 19:52]  ANCA: cANCA Negative, pANCA Negative, atypical ANCA Indeterminate Method interference due to ALBA fluorescence      [09-01-18 @ 20:54]  anti-GBM <0.2      [09-01-18 @ 23:27]  Syphilis Screen (Treponema Pallidum Ab) Negative      [09-01-18 @ 20:54]  PLA2R: SUMAYA 3, IFA Negative      [09-01-18 @ 23:27]  Free Light Chains: kappa 13.37, lambda 15.70, ratio = 0.85      [09-01 @ 19:52]  Immunofixation Serum:   No Monoclonal Band Identified      [09-01-18 @ 19:52]    EXAM:  XR CHEST PA LAT 2V                          PROCEDURE DATE:  09/18/2018      INTERPRETATION:  PA and lateral chest radiographs     COMPARISON: 8/30/2018.    CLINICAL INFORMATION: Shortness of breath.    FINDINGS:    The airway is midline.  There are no airspace consolidations.  There is no pleural effusion or pneumothorax.   The heart size is within the limits of normal.   The visualized osseus structures are intact.     IMPRESSION:    No acute radiographic cardiopulmonary pathology.

## 2018-09-18 NOTE — H&P ADULT - ATTENDING COMMENTS
Patient seen and examined at the bedside. Agree with the above history, physical, assessment, and plan with the necessary amendments/elaborations below:    pt known well to our service, recently seen for acute renal failure of undetermined cause. at that time despite extensive conversations regarding medical condition, potential options for treatment, pt refused HD in order to pursue second opinion. at this time returning with symptoms consistent with complications of worsening renal failure with a worsening in electrolyte homeostasis and inc in Cr. again pt hesistant to pursue treatment. failed to follow up with H clinic or renal for second opinion of her choosing as well as renal here as recommended. discussed with Dr Smyth who will again discuss utility of peritoneal HD with pt in AM, if pt would like treatment it will be started inpatient, otherwise not much else to offer expect for symptomatic care of edema and management of electrolytes as possible with meds. will continue to  and answer all questions to help debunk some of the preconceived notions she has regarding her medical condition as also noted last admission to have low health literacy.    regarding results of renal bx done last admission, noted to have majority of glomerulus affected with complete sclerosis but with small areas unaffected, suspecting FSGS however exact cause still not elucidated. given high ALBA, ? lupus nephritis, still remains in differential as does post infectious disorder per bx suggestion. pt may not need lifelong HD but cannot confirm this at this time, depends on response if she does start it. will discuss bx results with renal regarding their final intepretation of findings.    At this time the patient is in a hemodynamic state that requires hospital level of care/treatment. This has been explained to the patient and they are agreeable to the terms of admittance and continued care.     Length of Admission: 75min

## 2018-09-18 NOTE — ED STATDOCS - NS_ ATTENDINGSCRIBEDETAILS _ED_A_ED_FT
I, Estelita Pritchard, performed the initial face to face bedside interview with this patient regarding history of present illness, review of symptoms and relevant past medical, social and family history.  I completed an independent physical examination.  I was the initial provider who evaluated this patient.   The history, relevant review of systems, past medical and surgical history, medical decision making, and physical examination was documented by the scribe in my presence and I attest to the accuracy of the documentation.

## 2018-09-18 NOTE — ED STATDOCS - PHYSICAL EXAMINATION
Gen: NAD, AOx3  Head: NCAT  HEENT: PERRL, oral mucosa moist, normal conjunctiva, neck supple  Lung: CTAB, no respiratory distress  CV: rrr, no murmur, Normal perfusion  Abd: soft, NTND, no CVA tenderness  MSK: No edema, no visible deformities  Neuro: No focal neurologic deficits  Skin: No rash   Psych: normal affect Gen: NAD, AOx3  Head: NCAT  HEENT: PERRL, oral mucosa moist, normal conjunctiva, neck supple  Lung: CTAB, no respiratory distress  CV: rrr, no murmur, Normal perfusion  Abd: soft, NTND  MSK: No edema, no visible deformities  Neuro: No focal neurologic deficits  Skin: No rash   Psych: normal affect

## 2018-09-18 NOTE — ED STATDOCS - CARE PLAN
Principal Discharge DX:	Renal failure  Secondary Diagnosis:	Orthopnea  Secondary Diagnosis:	Lower extremity edema

## 2018-09-18 NOTE — ED STATDOCS - PROGRESS NOTE DETAILS
worsening renal failure. Clinically not uremic. K 5.7 no EKG changes. will plan to admit with nephro consult -Francheska DO

## 2018-09-18 NOTE — H&P ADULT - NSHPPHYSICALEXAM_GEN_ALL_CORE
Vitals  T(C): 36.8 (09-18-18 @ 16:08), Max: 36.8 (09-18-18 @ 14:30)  HR: 74 (09-18-18 @ 16:08) (66 - 74)  BP: 123/79 (09-18-18 @ 16:08) (122/78 - 136/72)  RR: 18 (09-18-18 @ 16:08) (16 - 20)  SpO2: 100% (09-18-18 @ 16:08) (100% - 100%)    GENERAL: NAD, well-groomed, well-developed. Cooperative. Afebrile, eupneic.  SKIN: Mild pallor. Mild palpebral edema w/o limitation to eye movements.   NECK: Supple, No JVD, Normal thyroid  NERVOUS SYSTEM:  Alert & Oriented X3, Good concentration; No focal signs.   RESP: Clear to auscultation bilaterally; No rales, rhonchi, wheezing, or rubs.  CVS: Regular rate and rhythm. No murmurs, rubs, or gallops. Capillary refill <2s  GI:  Bowel sounds are present. Abdomen in soft and non tender to palpation.   EXTREMITIES:  2+ Peripheral Pulses. Mild pitting pedal edema 1+ b/l. No clubbing, no cyanosis.    LYMPH: No cervical or supraclavicular lymphadenopathy noted  PSYCH: normal mood and affection. Good eye contact.

## 2018-09-18 NOTE — ED STATDOCS - NS ED ROS FT
ROS: no CP/SOB. no cough. no fever. no n/v/d/c. no abd pain. no rash. no bleeding. no urinary complaints. no weakness. no vision changes. no HA. no neck/back pain. no extremity swelling/deformity. No change in mental status. ROS: no CP +SOB. no cough. no fever. no n/v/d/c. no abd pain. no rash. no bleeding. no urinary complaints. no weakness. no vision changes. no HA. no neck/back pain. +extremity swelling. No change in mental status.

## 2018-09-18 NOTE — ED ADULT NURSE NOTE - OBJECTIVE STATEMENT
Patient A&OX4, Denies any pain at this time. c/o bilateral leg swelling and swelling to right side face. Hx renal failure. No edema noted. Denies any chest pain or SOB.

## 2018-09-18 NOTE — H&P ADULT - PMH
Hyperprolactinemia    Hypertension, unspecified type    Migraine without aura, not intractable, without status migrainosus    Stage 5 chronic kidney disease not on chronic dialysis

## 2018-09-18 NOTE — ED STATDOCS - MEDICAL DECISION MAKING DETAILS
Patient with new onset renal failure, with intermittent swelling lower extremities worse with fluid intake and end of day.  Swelling in face after dependent over night and orthopnea at night.  No respiratory distress now, no swelling, exam unremarkable.  Will check x-ray, ekg and electrolytes for possible worsening renal failure.  If normal, will d/c with nephrology follow up.

## 2018-09-18 NOTE — ED ADULT NURSE NOTE - NSIMPLEMENTINTERV_GEN_ALL_ED
Implemented All Universal Safety Interventions:  [place] to call system. Call bell, personal items and telephone within reach. Instruct patient to call for assistance. Room bathroom lighting operational. Non-slip footwear when patient is off stretcher. Physically safe environment: no spills, clutter or unnecessary equipment. Stretcher in lowest position, wheels locked, appropriate side rails in place.

## 2018-09-18 NOTE — H&P ADULT - ASSESSMENT
34 yo female patient with h/o CKD not on HD, Migraines,  HTN presenting with pedal edema up to 1/3 of distal of lower extremities b/l, and  palpebral b/l edema for the last 3 days, concerning of worsening of kidney function.     PROBLEMS    Metabolic acidosis secondary to Chronic Kidney disease  ·	Patient had Kidney bx that shows sclerotic changes concerning of FSGS?  ·	Unclear etiology at the time, patient had mildly elevated dsDNA antibody and decreased C3  ·	bun Cr  7.49, increased of 18% compared to 09/03  ·	Sodium bicarbonate 18   ·	Hyperkalemia 5.7  ·	Hyperphosphatemia 6.6  ·	Patient refused HD on last admission  ·	Start Sodium bicarbonate infusion  75 cc/Hr  ·	Nephro consult pending.  ·	PO diet with renal restrictions     Anemia of chronic disease    Hypertension  Migraines  Prolactinemia  Prophylactic measures 32 yo female patient with h/o CKD not on HD, Migraines,  HTN presenting with pedal edema up to 1/3 of distal of lower extremities b/l, and  palpebral b/l edema for the last 3 days, concerning of worsening of kidney function.     PROBLEMS    Metabolic acidosis secondary to Chronic Kidney disease  ·	Patient had Kidney bx that shows sclerotic changes concerning of FSGS?  ·	Unclear etiology at the time, patient had mildly elevated dsDNA antibody and decreased C3  ·	bun Cr  7.49, increased of 18% compared to 09/03  ·	Sodium bicarbonate 18   ·	Hyperkalemia 5.7  ·	Hyperphosphatemia 6.6  ·	Patient refused HD on last admission  ·	Start sodium bicarbonate  Infusion 0.225 mEq/kG/Hr (75 mL/Hr) IV Continuous  ·	calcitriol   Capsule 0.5 MICROGram(s) Oral daily  ·	ergocalciferol 60860 Unit(s) Oral every week  ·	Nephro consult pending.  ·	PO diet with renal restrictions     Anemia of chronic disease  ·	Hb w/o change from last admission.  ·	c/w ferrous sulfate Oral [id number] milliGRAM(s) Oral daily  ·	c/w ascorbic acid  Oral Tab/Cap - Peds 250 milliGRAM(s) Oral daily    Hypertension  ·	Stable  ·	c/w carvedilol 25 milliGRAM(s) Oral every 12 hours  ·	c/w amlodipine 10 mg Oral qd    Migraines  ·	Asymptomatic  ·	Tylenol 650 mg PO q6 hours PRN       Prophylactic measures  ·	heparin  Injectable 5000 Unit(s) SubCutaneous every 12 hours 34 yo female patient with h/o CKD not on HD, Migraines,  HTN presenting with pedal edema up to 1/3 of distal of lower extremities b/l, and  palpebral b/l edema for the last 3 days, concerning of worsening of kidney function.     PROBLEMS    Metabolic acidosis secondary to Chronic Kidney disease  ·	Patient had Kidney bx that shows sclerotic changes concerning of FSGS?  ·	Unclear etiology at the time, patient had mildly elevated dsDNA antibody and decreased C3  ·	bun Cr  7.49, increased of 18% compared to 09/03  ·	Sodium bicarbonate 18   ·	Hyperkalemia 5.7  ·	Hyperphosphatemia 6.6  ·	Patient refused HD on last admission  ·	Start sodium bicarbonate  Infusion 0.225 mEq/kG/Hr (75 mL/Hr) IV Continuous  ·	Ondansetron 4mg IV PRN nausea/vomiting   ·	calcitriol   Capsule 0.5 MICROGram(s) Oral daily  ·	ergocalciferol 34003 Unit(s) Oral every week  ·	Nephro consult pending.  ·	PO diet with renal restrictions     Anemia of chronic disease  ·	Hb w/o change from last admission.  ·	c/w ferrous sulfate Oral [id number] milliGRAM(s) Oral daily  ·	c/w ascorbic acid  Oral Tab/Cap - Peds 250 milliGRAM(s) Oral daily    Hypertension  ·	Stable  ·	c/w carvedilol 25 milliGRAM(s) Oral every 12 hours  ·	c/w amlodipine 10 mg Oral qd    Migraines  ·	Asymptomatic  ·	Tylenol 650 mg PO q6 hours PRN       Prophylactic measures  ·	heparin  Injectable 5000 Unit(s) SubCutaneous every 12 hours

## 2018-09-18 NOTE — H&P ADULT - HISTORY OF PRESENT ILLNESS
34 yo female patient with h/o CKD stage 5 not on HD, Migraines,  HTN presenting with pedal edema up to 1/3 of distal of lower extremities b/l, and  palpebral b/l edema for the last 3 days, associated with nausea. The edema is predominant in early morning and decreases gradually during the day.     Patient denies chest pain, palpitations, SOB, fevers, chills, vomiting, abdominal pain.

## 2018-09-18 NOTE — PROGRESS NOTE ADULT - SUBJECTIVE AND OBJECTIVE BOX
Vital Signs Last 24 Hrs,  T(C): 36.7 (18 Sep 2018 12:07), Max: 36.7 (18 Sep 2018 12:07)  T(F): 98.1 (18 Sep 2018 12:07), Max: 98.1 (18 Sep 2018 12:07)  HR: 66 (18 Sep 2018 12:07) (66 - 66)  BP: 122/78 (18 Sep 2018 12:07) (122/78 - 122/78)  BP(mean): --  RR: 16 (18 Sep 2018 12:07) (16 - 16)  SpO2: 100% (18 Sep 2018 12:07) (100% - 100%)    136    |  105    |  90.0<H>  ----------------------------<  101    Ca:9.3   (18 Sep 2018 13:08)  5.7<H>   |  16.0<L>  |  7.49<H>      eGFR if Non : 6 <L>  eGFR if : 7 <L>    TPro  8.0    /  Alb  4.0    /  TBili  0.6    /  DBili  x      /  AST  12     /  ALT  24     /  AlkPhos  77     18 Sep 2018 13:08                           10.7<L>  3.1<L> )-----------( 241      ( 18 Sep 2018 13:08 )               32.8<L>    Phos: 6.6 mg/dL<H> M.5 mg/dL PTH:-- Uric acid:-- Serum Osm:--  Ferritin:-- Iron:-- TIBC:-- Tsat:--  B12:-- TSH:-- ( @ 13:08)    Urinalysis Basic - ( 18 Sep 2018 14:01 )  Color: Yellow / Appearance: Clear / S.010 / pH: x  Gluc: x / Ketone: Negative  / Bili: Negative / Urobili: Negative mg/dL   Blood: x / Protein: 500 mg/dL<!> / Nitrite: Negative   Leuk Esterase: Negative / RBC: 3-5 /HPF<!> / WBC 3-5   Sq Epi: x / Non Sq Epi: Occasional / Bacteria: x      UProt:-- UCr:19 mg/dL P/C Ratio:7.5 Ratio<H> 24 hour Prot:-- UVol:-- CrCl:--  Eliana:-- UOsm:-- UVol:-- UCl:-- UK:-- ( @ 02:26)    Patient seen , Examined ,    Full note to Follow,

## 2018-09-18 NOTE — H&P ADULT - NSHPSOCIALHISTORY_GEN_ALL_CORE
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

## 2018-09-18 NOTE — H&P ADULT - FAMILY HISTORY
Father  Still living? Unknown  Family history of hypertension, Age at diagnosis: Age Unknown     Uncle  Still living? Yes, Estimated age: Age Unknown  Family history of renal failure syndrome, Age at diagnosis: Age Unknown

## 2018-09-18 NOTE — ED STATDOCS - OBJECTIVE STATEMENT
Patient is a 33 year old F with a PMHx of HTN who presents to the ED complaining of Patient is a 33 year old F with a PMHx of HTN who presents to the ED complaining of leg swelling x 4 days.  Patient was admitted 8/30-9/5 for kidney issues, and told she should have dialysis, but never had it done. Also notes swelling to the R side of her face for the past 4 days as well.  Notes she does sleep on the R side.  States that she is no longer drinking a lot of fluids because that's when her legs swell the worst. Notes that she sometimes becomes sob when lying down, which is alleviated when sitting up.  Has not followed up since being seen here. Also notes that when she breaths in she senses a "poisonous smell," and reports occasional nausea.  Denies any vomiting, urinary symptoms, or other medical complaints at this time.  States that they did a biopsy but do not yet have the results.      Nephrologist: Dr. Singer  : Denise Patient is a 33 year old F with a PMHx of HTN who presents to the ED complaining of leg swelling x 4 days.  Patient was admitted 8/30-9/5 for kidney issues, and told she should have dialysis, but never had it done. Also notes swelling to the R side of her face for the past 4 days as well.  Notes she does sleep on the R side- only there in the morning.  States that she is no longer drinking a lot of fluids because that's when her legs swell the worst. Notes that she sometimes becomes sob when lying down, which is alleviated when sitting up.  Has not followed up since being seen here. Also notes that when she breaths in she senses a "poisonous smell," when burping and reports occasional nausea.  Denies any vomiting, urinary symptoms, or other medical complaints at this time.  States that they did a biopsy but do not yet have the results.      Nephrologist: Dr. Singer  : Denise

## 2018-09-18 NOTE — ED STATDOCS - ATTENDING CONTRIBUTION TO CARE
I, Estelita Pritchard, performed the initial face to face bedside interview with this patient regarding history of present illness, review of symptoms and relevant past medical, social and family history.  I completed an independent physical examination.  I was the initial provider who evaluated this patient.   The history, relevant review of systems, past medical and surgical history, medical decision making, and physical examination was documented by the scribe in my presence and I attest to the accuracy of the documentation.     Follow-up on ordered tests (ie labs, radiologic studies) and re-evaluation of the patient's status has been communicated to the ACP.  Disposition of the patient will be based on test outcome and response to ED interventions.

## 2018-09-19 LAB
ANION GAP SERPL CALC-SCNC: 15 MMOL/L — SIGNIFICANT CHANGE UP (ref 5–17)
APTT BLD: 33.7 SEC — SIGNIFICANT CHANGE UP (ref 27.5–37.4)
BLD GP AB SCN SERPL QL: SIGNIFICANT CHANGE UP
BUN SERPL-MCNC: 92 MG/DL — HIGH (ref 8–20)
CALCIUM SERPL-MCNC: 9.1 MG/DL — SIGNIFICANT CHANGE UP (ref 8.6–10.2)
CHLORIDE SERPL-SCNC: 99 MMOL/L — SIGNIFICANT CHANGE UP (ref 98–107)
CO2 SERPL-SCNC: 25 MMOL/L — SIGNIFICANT CHANGE UP (ref 22–29)
CREAT SERPL-MCNC: 7.79 MG/DL — HIGH (ref 0.5–1.3)
GLUCOSE SERPL-MCNC: 104 MG/DL — SIGNIFICANT CHANGE UP (ref 70–115)
INR BLD: 1.08 RATIO — SIGNIFICANT CHANGE UP (ref 0.88–1.16)
MAGNESIUM SERPL-MCNC: 2.2 MG/DL — SIGNIFICANT CHANGE UP (ref 1.8–2.6)
PHOSPHATE SERPL-MCNC: 6.2 MG/DL — HIGH (ref 2.4–4.7)
POTASSIUM SERPL-MCNC: 3.8 MMOL/L — SIGNIFICANT CHANGE UP (ref 3.5–5.3)
POTASSIUM SERPL-SCNC: 3.8 MMOL/L — SIGNIFICANT CHANGE UP (ref 3.5–5.3)
PROTHROM AB SERPL-ACNC: 11.9 SEC — SIGNIFICANT CHANGE UP (ref 9.8–12.7)
SODIUM SERPL-SCNC: 139 MMOL/L — SIGNIFICANT CHANGE UP (ref 135–145)
TYPE + AB SCN PNL BLD: SIGNIFICANT CHANGE UP

## 2018-09-19 PROCEDURE — 99233 SBSQ HOSP IP/OBS HIGH 50: CPT

## 2018-09-19 PROCEDURE — G0365: CPT | Mod: 26

## 2018-09-19 PROCEDURE — 99233 SBSQ HOSP IP/OBS HIGH 50: CPT | Mod: GC

## 2018-09-19 RX ORDER — IRON SUCROSE 20 MG/ML
100 INJECTION, SOLUTION INTRAVENOUS EVERY 24 HOURS
Qty: 0 | Refills: 0 | Status: COMPLETED | OUTPATIENT
Start: 2018-09-19 | End: 2018-09-21

## 2018-09-19 RX ADMIN — IRON SUCROSE 210 MILLIGRAM(S): 20 INJECTION, SOLUTION INTRAVENOUS at 07:53

## 2018-09-19 RX ADMIN — Medication 250 MILLIGRAM(S): at 12:05

## 2018-09-19 RX ADMIN — CALCITRIOL 0.5 MICROGRAM(S): 0.5 CAPSULE ORAL at 12:05

## 2018-09-19 RX ADMIN — Medication 100 MILLIGRAM(S): at 18:49

## 2018-09-19 RX ADMIN — HEPARIN SODIUM 5000 UNIT(S): 5000 INJECTION INTRAVENOUS; SUBCUTANEOUS at 05:27

## 2018-09-19 RX ADMIN — CARVEDILOL PHOSPHATE 25 MILLIGRAM(S): 80 CAPSULE, EXTENDED RELEASE ORAL at 05:27

## 2018-09-19 RX ADMIN — CARVEDILOL PHOSPHATE 25 MILLIGRAM(S): 80 CAPSULE, EXTENDED RELEASE ORAL at 19:23

## 2018-09-19 RX ADMIN — Medication 75 MEQ/KG/HR: at 12:11

## 2018-09-19 RX ADMIN — Medication 100 MILLIGRAM(S): at 05:27

## 2018-09-19 NOTE — CONSULT NOTE ADULT - SUBJECTIVE AND OBJECTIVE BOX
VASCULAR SURGERY CONSULT    HPI:  32 yo female patient with h/o CKD stage 5 not on HD, Migraines,  HTN presenting with pedal edema up to 1/3 of distal of lower extremities b/l, and  palpebral b/l edema for the last 3 days, associated with nausea. The edema is predominant in early morning and decreases gradually during the day. Patient denies chest pain, palpitations, SOB, fevers, chills, vomiting, abdominal pain.       PAST MEDICAL HISTORY:  Hyperprolactinemia  Stage 5 chronic kidney disease not on chronic dialysis  Hypertension, unspecified type  Migraine without aura, not intractable, without status migrainosus  HTN (hypertension)      PAST SURGICAL HISTORY:  History of bilateral tubal ligation      ALLERGIES:  No Known Allergies    FAMILY HISTORY: HTN (father), Renal Failure at undetermined age (Uncle)    SOCIAL HISTORY:  Denies EtOH, smoking or substance abuse  Lives with  and 2 children    HOME MEDICATIONS:  amLODIPine   Tablet 10 milliGRAM(s) Oral daily  ascorbic acid  Oral Tab/Cap - Peds 250 milliGRAM(s) Oral daily  calcitriol   Capsule 0.5 MICROGram(s) Oral daily  carvedilol 25 milliGRAM(s) Oral every 12 hours  docusate sodium 100 milliGRAM(s) Oral two times a day  ergocalciferol 03433 Unit(s) Oral every week  heparin  Injectable 5000 Unit(s) SubCutaneous every 12 hours  influenza   Vaccine 0.5 milliLiter(s) IntraMuscular once  iron sucrose IVPB 100 milliGRAM(s) IV Intermittent every 24 hours  sodium bicarbonate  Infusion 0.225 mEq/kG/Hr (75 mL/Hr) IV Continuous <Continuous>    MEDICATIONS  (PRN):  acetaminophen   Tablet .. 650 milliGRAM(s) Oral every 6 hours PRN Mild Pain (1 - 3), Moderate Pain (4 - 6)  ondansetron    Tablet 4 milliGRAM(s) Oral every 8 hours PRN Nausea and/or Vomiting      VITALS & I/Os:  Vital Signs Last 24 Hrs  T(C): 36.8 (19 Sep 2018 07:46), Max: 36.9 (19 Sep 2018 00:28)  T(F): 98.3 (19 Sep 2018 07:46), Max: 98.5 (19 Sep 2018 00:28)  HR: 68 (19 Sep 2018 11:38) (67 - 74)  BP: 118/74 (19 Sep 2018 11:38) (109/65 - 136/72)  BP(mean): --  RR: 18 (19 Sep 2018 11:38) (18 - 20)  SpO2: 100% (19 Sep 2018 11:38) (100% - 100%)  CAPILLARY BLOOD GLUCOSE          I&O's Summary      GENERAL: Alert, well developed, in no acute distress.  MENTAL STATUS: AAOx3. Appropriate affect.  HEENT: PERRLA. EOMI. MMM.  Trachea midline. No lymph node swelling or tenderness.  RESPIRATORY: CTAB. No wheezing, rales or rhonchi.  CARDIOVASCULAR: RRR. No audible murmurs, rubs or gallops.   GASTROINTESTINAL: Abdomen soft, NT, ND, -R/-G.  No pulsatile mass, no flank tenderness or suprapubic tenderness. No hepatosplenomegaly.  NEUROLOGIC: Cranial nerves II-XII grossly intact. No focal neurological deficits. Moves all extremities spontaneously. Sensation intact bilaterally.  Ext: Mild pitting pedal edema b/l, no clubbing, no cyanosis  VASCULAR: 2+DP/PT, Popliteal, and femoral pulses bilaterally  MUSCULOSKELETAL: No cyanosis or clubbing. No gross deformities.   LYMPHATIC: Palpation of neck reveals no swelling or tenderness of neck nodes. Palpation of groin reveals no swelling or tenderness of groin nodes.    LABS:                        10.7   3.1   )-----------( 241      ( 18 Sep 2018 13:08 )             32.8         139  |  99  |  92.0<H>  ----------------------------<  104  3.8   |  25.0  |  7.79<H>    Ca    9.1      19 Sep 2018 07:06  Phos  6.2       Mg     2.2         TPro  8.0  /  Alb  4.0  /  TBili  0.6  /  DBili  x   /  AST  12  /  ALT  24  /  AlkPhos  77      Lactate: acetaminophen   Tablet .. 650 milliGRAM(s) Oral every 6 hours PRN  amLODIPine   Tablet 10 milliGRAM(s) Oral daily  ascorbic acid  Oral Tab/Cap - Peds 250 milliGRAM(s) Oral daily  calcitriol   Capsule 0.5 MICROGram(s) Oral daily  carvedilol 25 milliGRAM(s) Oral every 12 hours  docusate sodium 100 milliGRAM(s) Oral two times a day  ergocalciferol 05143 Unit(s) Oral every week  heparin  Injectable 5000 Unit(s) SubCutaneous every 12 hours  influenza   Vaccine 0.5 milliLiter(s) IntraMuscular once  iron sucrose IVPB 100 milliGRAM(s) IV Intermittent every 24 hours  ondansetron    Tablet 4 milliGRAM(s) Oral every 8 hours PRN  sodium bicarbonate  Infusion 0.225 mEq/kG/Hr IV Continuous <Continuous>               Urinalysis Basic - ( 18 Sep 2018 14:01 )    Color: Yellow / Appearance: Clear / S.010 / pH: x  Gluc: x / Ketone: Negative  / Bili: Negative / Urobili: Negative mg/dL   Blood: x / Protein: 500 mg/dL / Nitrite: Negative   Leuk Esterase: Negative / RBC: 3-5 /HPF / WBC 3-5   Sq Epi: x / Non Sq Epi: Occasional / Bacteria

## 2018-09-19 NOTE — PROGRESS NOTE ADULT - SUBJECTIVE AND OBJECTIVE BOX
[facility] DIVISION OF KIDNEY DISEASES AND HYPERTENSION -- FOLLOW UP NOTE  --------------------------------------------------------------------------------  Chief Complaint:  CKD V    24 hour events/subjective:  Pt seen and examined  NAD  Now agreeing with HD        PAST HISTORY  --------------------------------------------------------------------------------  No significant changes to PMH, PSH, FHx, SHx, unless otherwise noted    ALLERGIES & MEDICATIONS  --------------------------------------------------------------------------------  Allergies    No Known Allergies    Intolerances      Standing Inpatient Medications  amLODIPine   Tablet 10 milliGRAM(s) Oral daily  ascorbic acid  Oral Tab/Cap - Peds 250 milliGRAM(s) Oral daily  calcitriol   Capsule 0.5 MICROGram(s) Oral daily  carvedilol 25 milliGRAM(s) Oral every 12 hours  docusate sodium 100 milliGRAM(s) Oral two times a day  ergocalciferol 88982 Unit(s) Oral every week  heparin  Injectable 5000 Unit(s) SubCutaneous every 12 hours  influenza   Vaccine 0.5 milliLiter(s) IntraMuscular once  iron sucrose IVPB 100 milliGRAM(s) IV Intermittent every 24 hours  sodium bicarbonate  Infusion 0.225 mEq/kG/Hr IV Continuous <Continuous>    PRN Inpatient Medications  acetaminophen   Tablet .. 650 milliGRAM(s) Oral every 6 hours PRN  ondansetron    Tablet 4 milliGRAM(s) Oral every 8 hours PRN      REVIEW OF SYSTEMS  --------------------------------------------------------------------------------  Gen: No weight changes, fatigue, fevers/chills, weakness  Skin: No rashes  Head/Eyes/Ears/Mouth: No headache; Normal hearing; Normal vision w/o blurriness; No sinus pain/discomfort, sore throat  Respiratory: No dyspnea, cough, wheezing, hemoptysis  CV: No chest pain, PND, orthopnea  GI: No abdominal pain, diarrhea, constipation, + nausea, No vomiting, melena, hematochezia  : No increased frequency, dysuria, hematuria, nocturia  MSK: No joint pain/swelling; no back pain; no edema  Neuro: No dizziness/lightheadedness, weakness, seizures, numbness, tingling  Heme: No easy bruising or bleeding  Endo: No heat/cold intolerance  Psych: No significant nervousness, anxiety, stress, depression    All other systems were reviewed and are negative, except as noted.      VITALS/PHYSICAL EXAM  --------------------------------------------------------------------------------  T(C): 36.8 (09-19-18 @ 07:46), Max: 36.9 (09-19-18 @ 00:28)  HR: 68 (09-19-18 @ 11:38) (67 - 74)  BP: 118/74 (09-19-18 @ 11:38) (109/65 - 123/79)  RR: 18 (09-19-18 @ 11:38) (18 - 19)  SpO2: 100% (09-19-18 @ 11:38) (100% - 100%)  Wt(kg): --  Height (cm): 162.56 (09-18-18 @ 12:01)  Weight (kg): 49.9 (09-18-18 @ 12:01)  BMI (kg/m2): 18.9 (09-18-18 @ 12:01)  BSA (m2): 1.52 (09-18-18 @ 12:01)      Physical Exam:  	Gen: NAD, pale, ill-appearing  	HEENT: PERRL, supple neck, clear oropharynx  	Pulm: CTA B/L  	CV: RRR, S1S2; no rub  	Back: No spinal or CVA tenderness; no sacral edema  	Abd: +BS, soft, nontender/nondistended  	: No suprapubic tenderness  	UE: Warm, FROM, no clubbing, intact strength; no edema; no asterixis  	LE: Warm, FROM, no clubbing, intact strength; 3 +  edema  	Neuro: No focal deficits, intact gait  	Psych: Normal affect and mood  	Skin: Warm, without rashes  	Vascular access: NA    LABS/STUDIES  --------------------------------------------------------------------------------              10.7   3.1   >-----------<  241      [09-18-18 @ 13:08]              32.8     139  |  99  |  92.0  ----------------------------<  104      [09-19-18 @ 07:06]  3.8   |  25.0  |  7.79        Ca     9.1     [09-19-18 @ 07:06]      Mg     2.2     [09-19-18 @ 07:06]      Phos  6.2     [09-19-18 @ 07:06]    TPro  8.0  /  Alb  4.0  /  TBili  0.6  /  DBili  x   /  AST  12  /  ALT  24  /  AlkPhos  77  [09-18-18 @ 13:08]          Creatinine Trend:  SCr 7.79 [09-19 @ 07:06]  SCr 7.39 [09-18 @ 22:48]  SCr 7.49 [09-18 @ 13:08]  SCr 6.31 [09-04 @ 08:26]  SCr 6.33 [09-03 @ 07:27]    Urinalysis - [09-18-18 @ 14:01]      Color Yellow / Appearance Clear / SG 1.010 / pH 6.0      Gluc Negative / Ketone Negative  / Bili Negative / Urobili Negative       Blood Moderate / Protein 500 / Leuk Est Negative / Nitrite Negative      RBC 3-5 / WBC 3-5 / Hyaline  / Gran  / Sq Epi  / Non Sq Epi Occasional / Bacteria       Iron 45, TIBC 209, %sat 19      [08-31-18 @ 06:22]  Ferritin 58      [08-31-18 @ 06:22]  PTH -- (Ca 8.8)      [09-02-18 @ 14:18]   206  PTH -- (Ca 8.8)      [09-01-18 @ 19:52]   181  HbA1c 5.2      [08-31-18 @ 06:22]  TSH 2.15      [08-31-18 @ 06:22]  Lipid: chol 97, TG 66, HDL 39, LDL 45      [08-31-18 @ 06:22]    HBsAb Nonreact      [09-01-18 @ 20:51]  HBsAg Nonreact      [08-31-18 @ 18:18]  HBcAb Nonreact      [08-31-18 @ 18:18]  HCV 0.20, Nonreact      [08-31-18 @ 18:18]    ALBA: titer 1:160, pattern Homogeneous      [09-01-18 @ 20:54]  dsDNA 33      [09-01-18 @ 20:54]  C3 Complement 73      [09-01-18 @ 20:12]  C4 Complement 17      [09-01-18 @ 19:52]  ANCA: cANCA Negative, pANCA Negative, atypical ANCA Indeterminate Method interference due to ALBA fluorescence      [09-01-18 @ 20:54]  anti-GBM <0.2      [09-01-18 @ 23:27]  Syphilis Screen (Treponema Pallidum Ab) Negative      [09-01-18 @ 20:54]  PLA2R: SUMAYA 3, IFA Negative      [09-01-18 @ 23:27]  Free Light Chains: kappa 13.37, lambda 15.70, ratio = 0.85      [09-01 @ 19:52]  Immunofixation Serum:   No Monoclonal Band Identified      [09-01-18 @ 19:52]

## 2018-09-19 NOTE — PROGRESS NOTE ADULT - ASSESSMENT
1. CKD V - Uremia  2. HTN   3. Anemia of CKD  4. MBD  5. Migraine    Now in agreement with HD  Needs CVC access  Vasculary surgery consult called  Plan CVC in am  HD in am after access obtained   Consent for HD needed  Eventual LRD - Transplant  Will follow

## 2018-09-19 NOTE — CONSULT NOTE ADULT - ASSESSMENT
32 y/o F with history of CKD stage 5 not on HD and HTN admitted due to pedal edema in b/l lower extremities. Patient was consulted to our service due to concerning worsening of kidney function. Potassium is WNL (3.8) but the BUN/Cr continues increasing trend now at 92 and 7.79, respectively. Patient will be pre-oped for a PermCath tomorrow 9/20/18 for initiation of HD.    Plan:  - Pre-op for PermCath on 9/20/18  - F/U Labs  - NPO after midnight  - Rest of care per primary team

## 2018-09-19 NOTE — PROGRESS NOTE ADULT - ASSESSMENT
?Focal segmental glomerulosclerosis (FSGS) is a histologic lesion, rather than a specific disease entity, that is commonly found to underlie the nephrotic syndrome in adults and children. FSGS is characterized by the presence of sclerosis in parts (segmental) of some (focal) glomeruli on light microscopic (LM) examination of a kidney biopsy specimen. The term "FSGS" is somewhat misleading, however, since the lesions of FSGS are not as focal or segmental in distribution as the name suggests.   ?FSGS is a common histopathologic lesion among adults with idiopathic nephrotic syndrome in the [place], accounting for 35 percent of all cases and over 50 percent among  Americans. By comparison, FSGS is a less common cause of nephrotic syndrome in other countries. This discrepancy may be explained, at least in part, by the fact that most data originating in the [place] previously considered the histopathologic lesion of FSGS as a single disease entity, without distinguishing primary from secondary FSGS.   ?FSGS can be classified into the following etiologies, based upon the known and/or postulated causes of this histologic pattern  •Primary or idiopathic FSGS, which most often presents with the nephrotic syndrome.  •Secondary FSGS, which most often presents with non-nephrotic proteinuria and, commonly, some degree of renal insufficiency. This category most commonly refers to FSGS that develops as an adaptive response to glomerular hypertrophy or hyperfiltration. This includes disorders associated with a reduced renal mass and/or renal vasodilation, such as unilateral renal agenesis. In addition, a nonspecific pattern of secondary FSGS can result from scarring produced by a previous injury. Other secondary causes of FSGS include drugs and toxins,  •Genetic causes of FSGS, which may present early in childhood with massive proteinuria and nephrotic syndrome or in adolescence or adulthood with less severe proteinuria.  ?The lesion of FSGS must be distinguished from the lesion of focal global glomerulosclerosis (FGGS), or more appropriately named "global glomerulosclerosis" (GGS), which is frequently a manifestation of normal aging and/or hypertension and can be superimposed on a lesion of FSGS, particularly in older patients. The lesion of FSGS must also be distinguished from that of minimal change disease since both can present similarly with proteinuria, podocyte foot process effacement, and minimal or no complement or immunoglobulin deposits.   ?The identification of an FSGS lesion in a kidney biopsy of a patient with proteinuria does not establish a specific diagnosis; rather, it should prompt the clinician to pursue a thorough evaluation to determine the underlying cause of the FSGS lesion. Electron microscopic (EM) examination of the kidney biopsy is required for proper evaluation and classification of a lesion of FSGS revealed by LM.   ?Differentiating between primary, secondary, and genetic forms of FSGS has important therapeutic and prognostic implications. In evaluating the patient with a confirmed FSGS lesion, we use a systematic approach that takes into account clinical and histologic features of the patient to differentiate between primary, secondary, and genetic forms of FSGS. Most patients with primary or secondary FSGS can be distinguished by the presence or absence of the nephrotic syndrome, the presence of identifiable risk factors for secondary FSGS, and the degree of podocyte foot process effacement visualized by EM examination of the kidney biopsy.  However, these clinicopathologic features do not always identify patients with genetic causes of FSGS. In patients who cannot be classified by clinicopathologic assessment, genetic testing, if available, should be considered to screen for monogenic forms of FSGS. In addition, in patients with an FSGS lesion, the presence of a family history of chronic kidney disease (CKD) or end-stage renal disease (ESRD) or physical findings suggestive of a syndromic presentation should prompt genetic testing

## 2018-09-19 NOTE — PROGRESS NOTE ADULT - SUBJECTIVE AND OBJECTIVE BOX
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  Polymorphisms in a region of chromosome 22, which are strongly linked with  ancestry, are associated with an increased risk of developing FSGS   Variants in the apolipoprotein L1 (APOL1) gene, which resides on chromosome 22, have been shown to be closely associated with nondiabetic nephropathy in  Americans  Polymorphisms in APOL1 appear to be expressed exclusively in individuals of  descent and have not been identified in any individuals from [place], [place], or [place] [73 </contents/yflfq-hzpdsughe-ogiklzzbvtobrqbsei-genetic-causes/abstract/73>]. APOL1 polymorphisms are associated with FSGS in  Americans:  ?An analysis that compared  Americans with nonfamilial biopsy-proven FSGS to  Americans without FSGS identified two APOL1 variants that are expressed more frequently in patients with FSGS [73 </contents/zkfvg-dkubrsink-twteqvzkyygbvxquob-genetic-causes/abstract/73>]. The association of APOL1 variants with renal disease was confirmed in a second, larger cohort of -American patients with hypertension-associated ESRD [  ?APOL1 genotypes were compared among 271 -American cases of FSGS, 168 -American cases of FSGS, and 939 control subjects [75 </contents/wsrdn-hjzareaaj-kcckutjhfnwfajfxvo-genetic-causes/abstract/75>]. APOL1 variants conferred 17-fold higher odds (95% CI 11-26) for FSGS and 29-fold higher odds (95% CI 13-68) for HIV-associated nephropathy (HIVAN). Individuals with two APOL1 risk alleles had an earlier age of onset and faster progression to ESRD but a similar sensitivity to glucocorticoids compared with other subjects. Positive selective forces likely underlie this linkage disequilibrium, suggesting that a biological advantage is conferred by the inheritance of these variants [76 </contents/vfxel-rtqbdvkwh-zhvsxjfmfoqyfmlibt-genetic-causes/abstract/76>]. In vitro studies suggest that the APOL1 gene variants that predispose to kidney disease may provide superior defense against a subspecies of trypanosomes, which would provide a selective advantage to carriers of these variants against sleeping sickness [73 </contents/zkarb-yukkmcyop-janxfxenwtcerafhtu-genetic-causes/abstract/73>].   The association between APOL1 genotype and risk of renal disease may be influenced by additional factors. As an example, the decline in renal function attributed to APOL1 risk variants has been shown to be dependent upon plasma levels of the soluble urokinase plasminogen activator receptor (suPAR). In a study involving two independent cohorts of -American individuals (1094 patients), higher baseline suPAR levels were associated with a greater annual decline in estimated glomerular filtration rate (eGFR) among patients with two APOL1 risk alleles compared with those with one or no alleles [77 </contents/xtugf-hfcstenvm-tlaxwaidnxfhemlojy-genetic-causes/abstract/77>]. This association may be explained by high-affinity protein-protein interactions between suPAR, ApoL1, and alpha v beta 3 integrin, whereby the ApoL1 protein variants G1 and G2 exhibit higher affinity for suPAR-activated alpha v beta 3 integrin than ApoL1 G0. In addition, ApoL1 G1 or G2 augment alpha v beta 3 integrin activation and cause proteinuria in mice in a suPAR-dependent manner.  Screening for APOL1 risk alleles is not presently indicated in  Americans with FSGS lesions, as it has no bearing on treatment decisions.

## 2018-09-19 NOTE — PROGRESS NOTE ADULT - SUBJECTIVE AND OBJECTIVE BOX
cc: Patient is a 33y old  Female who presents with a chief complaint of Abnormal labs (18 Sep 2018 17:12)        INTERVAL/OVERNIGHT EVENTS:    Patient examined at beside. No acute events over night. As per nurse, ___________________. Patient refers felling  better/ worse/ same _________________. Patient is tolerating _______ diet w/o nausea/vomiting/diarrhea/abdominal pain. Patient is voiding actively and las BM  on __________. Patient is deambulating / OOB to chair _______________. Patient denies chest pain, calf pain, abdominal pain, headaches, fever, chills, dysuria, hematuria, diarrhea, constipation, SOB, palpitations. Rest of ROS not contributory except for above.      CARDIAC MONITOR  OXYGEN:   Mask / Cannula    L/min     O2 Sat:   %    I&O's Summary      Daily Height in cm: 162.56 (18 Sep 2018 12:01)    Daily     CAPILLARY BLOOD GLUCOSE        Vitals  T(C): 36.5 (09-19-18 @ 04:25), Max: 36.9 (09-19-18 @ 00:28)  HR: 67 (09-19-18 @ 04:25) (66 - 74)  BP: 109/65 (09-19-18 @ 04:25) (109/65 - 136/72)  RR: 19 (09-19-18 @ 04:25) (16 - 20)  SpO2: 100% (09-19-18 @ 04:25) (100% - 100%)  Wt(kg): --    Physical Exam:   GENERAL: NAD, well-groomed, well-developed  HEAD:  Atraumatic, Normocephalic  EYES: EOMI, PERRLA, conjunctiva and sclera clear  ENMT: No tonsillar erythema, exudates, or enlargement; Moist mucous membranes, Good dentition, No lesions  NECK: Supple, No JVD, Normal thyroid  NERVOUS SYSTEM:  Alert & Oriented X3, Good concentration; Motor Strength 5/5 B/L upper and lower extremities;   RESP: Clear to auscultation bilaterally; No rales, rhonchi, wheezing, or rubs  CVS: Regular rate and rhythm; No murmurs, rubs, or gallops  GI: Soft, Nontender, Nondistended; Bowel sounds present  EXTREMITIES:  2+ Peripheral Pulses, No clubbing, cyanosis, or edema  LYMPH: No cervical or supraclavicular lymphadenopathy noted  SKIN: No rashes or lesions      LABS                          10.7   3.1   )-----------( 241      ( 18 Sep 2018 13:08 )             32.8         09-18    141  |  102  |  89.0<H>  ----------------------------<  105  4.0   |  23.0  |  7.39<H>    Ca    9.1      18 Sep 2018 22:48  Phos  6.6     09-18  Mg     2.5     09-18    TPro  8.0  /  Alb  4.0  /  TBili  0.6  /  DBili  x   /  AST  12  /  ALT  24  /  AlkPhos  77  09-18            LIVER FUNCTIONS - ( 18 Sep 2018 13:08 )  Alb: 4.0 g/dL / Pro: 8.0 g/dL / ALK PHOS: 77 U/L / ALT: 24 U/L / AST: 12 U/L / GGT: x               DIET:    MEDICATIONS  (STANDING):  amLODIPine   Tablet 10 milliGRAM(s) Oral daily  ascorbic acid  Oral Tab/Cap - Peds 250 milliGRAM(s) Oral daily  calcitriol   Capsule 0.5 MICROGram(s) Oral daily  carvedilol 25 milliGRAM(s) Oral every 12 hours  docusate sodium 100 milliGRAM(s) Oral two times a day  ergocalciferol 62207 Unit(s) Oral every week  ferrous sulfate Oral Tab/Cap - Peds 325 milliGRAM(s) Oral daily  heparin  Injectable 5000 Unit(s) SubCutaneous every 12 hours  influenza   Vaccine 0.5 milliLiter(s) IntraMuscular once  sodium bicarbonate  Infusion 0.225 mEq/kG/Hr (75 mL/Hr) IV Continuous <Continuous>    MEDICATIONS  (PRN):  acetaminophen   Tablet .. 650 milliGRAM(s) Oral every 6 hours PRN Mild Pain (1 - 3), Moderate Pain (4 - 6)  ondansetron    Tablet 4 milliGRAM(s) Oral every 8 hours PRN Nausea and/or Vomiting cc: Patient is a 33y old  Female who presents with a chief complaint of Abnormal labs (18 Sep 2018 17:12)        INTERVAL/OVERNIGHT EVENTS:    Patient examined at beside. No acute events over night.   Patient refers felling better compared to admission, regarding pedal and palpebral edema. Patient denies nausea. Patient is tolerating PO diet, states she is voiding and last BM yesterday. Patient is ambulating. Patient denies chest pain, calf pain, abdominal pain, headaches, fever, chills, dysuria, hematuria, diarrhea, constipation, SOB, palpitations. Rest of ROS not contributory except for above.      CARDIAC MONITOR: no events.      Vitals  T(C): 36.7 (19 Sep 2018 17:12), Max: 36.9 (19 Sep 2018 00:28)  T(F): 98 (19 Sep 2018 17:12), Max: 98.5 (19 Sep 2018 00:28)  HR: 65 (19 Sep 2018 19:20) (63 - 74)  BP: 128/69 (19 Sep 2018 19:20) (109/65 - 128/69)  RR: 20 (19 Sep 2018 17:12) (18 - 20)  SpO2: 98% (19 Sep 2018 17:12) (96% - 100%)    Physical Exam:   GENERAL: NAD, well-groomed, well-developed. Cooperative. Pallor  HEAD:  Atraumatic, Normocephalic. No tenderness on palpation  EYES: EOMI, PERRLA, conjunctiva and sclera clear. Mild palpebral edema.  ENMT: No tonsillar erythema, exudates, or enlargement; Moist mucous membranes.  No lesions  NECK: Supple, No JVD, thyroid non visible, non palpable  NERVOUS SYSTEM:  Alert & Oriented X3, Good concentration; Motor Strength 5/5 B/L upper and lower extremities;   RESP: Clear to auscultation bilaterally; No rales, rhonchi, wheezing, or rubs  CVS: Regular rate and rhythm; No murmurs, rubs, or gallops. Capillary refill <2s  GI: Soft, Nontender, Nondistended; Bowel sounds present  EXTREMITIES:  2+ Peripheral Pulses, No clubbing, cyanosis, or edema.   LYMPH: No cervical or supraclavicular lymphadenopathy noted  SKIN: No rashes or lesions      LABS    Complete blood count  _________________________________________________________                      10.7   3.1   )-----------( 241      ( 18 Sep 2018 13:08 )             32.8       Chemistry  _____________________________________________________________    09-19    139  |  99  |  92.0<H>  ----------------------------<  104  3.8   |  25.0  |  7.79<H>    Ca    9.1      19 Sep 2018 07:06  Phos  6.2     09-19  Mg     2.2     09-19    TPro  8.0  /  Alb  4.0  /  TBili  0.6  /  DBili  x   /  AST  12  /  ALT  24  /  AlkPhos  77  09-18 09-18    141  |  102  |  89.0<H>  ----------------------------<  105  4.0   |  23.0  |  7.39<H>    Ca    9.1      18 Sep 2018 22:48  Phos  6.6     09-18  Mg     2.5     09-18    TPro  8.0  /  Alb  4.0  /  TBili  0.6  /  DBili  x   /  AST  12  /  ALT  24  /  AlkPhos  77  09-18      LIVER FUNCTIONS - ( 18 Sep 2018 13:08 )  Alb: 4.0 g/dL / Pro: 8.0 g/dL / ALK PHOS: 77 U/L / ALT: 24 U/L / AST: 12 U/L / GGT: x               DIET:  -Regular With renal restrictions  -Will be NPO after midnight.    MEDICATIONS  (STANDING):  amLODIPine   Tablet 10 milliGRAM(s) Oral daily  ascorbic acid  Oral Tab/Cap - Peds 250 milliGRAM(s) Oral daily  calcitriol   Capsule 0.5 MICROGram(s) Oral daily  carvedilol 25 milliGRAM(s) Oral every 12 hours  docusate sodium 100 milliGRAM(s) Oral two times a day  ergocalciferol 99447 Unit(s) Oral every week  heparin  Injectable 5000 Unit(s) SubCutaneous every 12 hours  influenza   Vaccine 0.5 milliLiter(s) IntraMuscular once  iron sucrose IVPB 100 milliGRAM(s) IV Intermittent every 24 hours  sodium bicarbonate  Infusion 0.225 mEq/kG/Hr (75 mL/Hr) IV Continuous <Continuous>    MEDICATIONS  (PRN):  acetaminophen   Tablet .. 650 milliGRAM(s) Oral every 6 hours PRN Mild Pain (1 - 3), Moderate Pain (4 - 6)  ondansetron    Tablet 4 milliGRAM(s) Oral every 8 hours PRN Nausea and/or Vomiting

## 2018-09-19 NOTE — PROGRESS NOTE ADULT - ASSESSMENT
32 yo female patient with h/o CKD not on HD, Migraines,  HTN presenting with pedal edema up to 1/3 of distal of lower extremities b/l, and  palpebral b/l edema for the last 3 days, concerning of worsening of kidney function.     PROBLEMS    Metabolic acidosis secondary to Chronic Kidney disease  ·	Patient had Kidney bx that shows sclerotic changes concerning of FSGS?  ·	Unclear etiology at the time, patient had mildly elevated dsDNA antibody and decreased C3  ·	bun Cr  7.49, increased of 18% compared to 09/03  ·	Sodium bicarbonate 18   ·	Hyperkalemia 5.7  ·	Hyperphosphatemia 6.6  ·	Patient refused HD on last admission  ·	Start sodium bicarbonate  Infusion 0.225 mEq/kG/Hr (75 mL/Hr) IV Continuous  ·	Ondansetron 4mg IV PRN nausea/vomiting   ·	calcitriol   Capsule 0.5 MICROGram(s) Oral daily  ·	ergocalciferol [id number] Unit(s) Oral every week  ·	Nephro consult pending.  ·	PO diet with renal restrictions     Anemia of chronic disease  ·	Hb w/o change from last admission.  ·	c/w ferrous sulfate Oral [id number] milliGRAM(s) Oral daily  ·	c/w ascorbic acid  Oral Tab/Cap - Peds 250 milliGRAM(s) Oral daily    Hypertension  ·	Stable  ·	c/w carvedilol 25 milliGRAM(s) Oral every 12 hours  ·	c/w amlodipine 10 mg Oral qd    Migraines  ·	Asymptomatic  ·	Tylenol 650 mg PO q6 hours PRN       Prophylactic measures  ·	heparin  Injectable 5000 Unit(s) SubCutaneous every 12 hours 34 yo female patient with h/o CKD not on HD, Migraines,  HTN presenting with pedal edema up to 1/3 of distal of lower extremities b/l, and  palpebral b/l edema for the last 3 days, concerning of worsening of kidney function.     Patient is clinically stable. Renal function is still in failure and decreasing compared to previous hospitalization. The patient refused to HD before but is willing to try HD.        PROBLEMS    Metabolic acidosis secondary to Chronic Kidney disease  ·	Patient had Kidney bx that shows sclerotic changes concerning of FSGS?  ·	Unclear etiology at the time, patient had mildly elevated dsDNA antibody and decreased C3  ·	bun Cr  7.49, increased of 18% compared to 09/03  ·	Sodium bicarbonate 18   ·	Hyperkalemia 5.7  ·	Hyperphosphatemia 6.6  ·	Patient refused HD on last admission  ·	Start sodium bicarbonate  Infusion 0.225 mEq/kG/Hr (75 mL/Hr) IV Continuous  ·	Ondansetron 4mg IV PRN nausea/vomiting   ·	calcitriol   Capsule 0.5 MICROGram(s) Oral daily  ·	ergocalciferol [id number] Unit(s) Oral every week  ·	Nephro eval noted and appreciated --> HD on 09/20 after permcath placement.   ·	Vascular surgery eval noted and appreciated   ·	NPO after midnight in preparation for PermCath on 9/20/18    Anemia of chronic disease  ·	Hb w/o change from last admission.  ·	d/c ferrous sulfate Oral [id number] milliGRAM(s) Oral daily  ·	Start iron sucrose IVPB 100 milliGRAM(s) IV Intermittent every 24 hour  ·	c/w ascorbic acid  Oral Tab/Cap - Peds 250 milliGRAM(s) Oral daily    Hypertension  ·	Stable  ·	c/w carvedilol 25 milliGRAM(s) Oral every 12 hours  ·	c/w amlodipine 10 mg Oral qd    Migraines  ·	Asymptomatic  ·	Tylenol 650 mg PO q6 hours PRN       Prophylactic measures  ·	heparin  Injectable 5000 Unit(s) SubCutaneous every 12 hours

## 2018-09-20 LAB
ANION GAP SERPL CALC-SCNC: 18 MMOL/L — HIGH (ref 5–17)
BUN SERPL-MCNC: 87 MG/DL — HIGH (ref 8–20)
CALCIUM SERPL-MCNC: 9 MG/DL — SIGNIFICANT CHANGE UP (ref 8.6–10.2)
CHLORIDE SERPL-SCNC: 99 MMOL/L — SIGNIFICANT CHANGE UP (ref 98–107)
CO2 SERPL-SCNC: 23 MMOL/L — SIGNIFICANT CHANGE UP (ref 22–29)
CREAT SERPL-MCNC: 7.24 MG/DL — HIGH (ref 0.5–1.3)
GLUCOSE SERPL-MCNC: 89 MG/DL — SIGNIFICANT CHANGE UP (ref 70–115)
HCT VFR BLD CALC: 30.3 % — LOW (ref 37–47)
HGB BLD-MCNC: 9.7 G/DL — LOW (ref 12–16)
MAGNESIUM SERPL-MCNC: 2 MG/DL — SIGNIFICANT CHANGE UP (ref 1.6–2.6)
MCHC RBC-ENTMCNC: 26.4 PG — LOW (ref 27–31)
MCHC RBC-ENTMCNC: 32 G/DL — SIGNIFICANT CHANGE UP (ref 32–36)
MCV RBC AUTO: 82.6 FL — SIGNIFICANT CHANGE UP (ref 81–99)
PHOSPHATE SERPL-MCNC: 6.4 MG/DL — HIGH (ref 2.4–4.7)
PLATELET # BLD AUTO: 198 K/UL — SIGNIFICANT CHANGE UP (ref 150–400)
POTASSIUM SERPL-MCNC: 3.7 MMOL/L — SIGNIFICANT CHANGE UP (ref 3.5–5.3)
POTASSIUM SERPL-SCNC: 3.7 MMOL/L — SIGNIFICANT CHANGE UP (ref 3.5–5.3)
RBC # BLD: 3.67 M/UL — LOW (ref 4.4–5.2)
RBC # FLD: 14.1 % — SIGNIFICANT CHANGE UP (ref 11–15.6)
SODIUM SERPL-SCNC: 140 MMOL/L — SIGNIFICANT CHANGE UP (ref 135–145)
WBC # BLD: 3.2 K/UL — LOW (ref 4.8–10.8)
WBC # FLD AUTO: 3.2 K/UL — LOW (ref 4.8–10.8)

## 2018-09-20 PROCEDURE — 99233 SBSQ HOSP IP/OBS HIGH 50: CPT | Mod: GC

## 2018-09-20 PROCEDURE — 36558 INSERT TUNNELED CV CATH: CPT | Mod: RT

## 2018-09-20 PROCEDURE — 90937 HEMODIALYSIS REPEATED EVAL: CPT

## 2018-09-20 RX ORDER — ERYTHROPOIETIN 10000 [IU]/ML
6000 INJECTION, SOLUTION INTRAVENOUS; SUBCUTANEOUS
Qty: 0 | Refills: 0 | Status: DISCONTINUED | OUTPATIENT
Start: 2018-09-20 | End: 2018-09-28

## 2018-09-20 RX ORDER — TUBERCULIN PURIFIED PROTEIN DERIVATIVE 5 [IU]/.1ML
5 INJECTION, SOLUTION INTRADERMAL ONCE
Qty: 0 | Refills: 0 | Status: COMPLETED | OUTPATIENT
Start: 2018-09-20 | End: 2018-09-20

## 2018-09-20 RX ORDER — SEVELAMER CARBONATE 2400 MG/1
800 POWDER, FOR SUSPENSION ORAL THREE TIMES A DAY
Qty: 0 | Refills: 0 | Status: DISCONTINUED | OUTPATIENT
Start: 2018-09-20 | End: 2018-09-28

## 2018-09-20 RX ADMIN — Medication 650 MILLIGRAM(S): at 12:40

## 2018-09-20 RX ADMIN — CARVEDILOL PHOSPHATE 25 MILLIGRAM(S): 80 CAPSULE, EXTENDED RELEASE ORAL at 06:02

## 2018-09-20 RX ADMIN — AMLODIPINE BESYLATE 10 MILLIGRAM(S): 2.5 TABLET ORAL at 06:01

## 2018-09-20 RX ADMIN — Medication 650 MILLIGRAM(S): at 11:37

## 2018-09-20 RX ADMIN — TUBERCULIN PURIFIED PROTEIN DERIVATIVE 5 UNIT(S): 5 INJECTION, SOLUTION INTRADERMAL at 14:33

## 2018-09-20 RX ADMIN — SEVELAMER CARBONATE 800 MILLIGRAM(S): 2400 POWDER, FOR SUSPENSION ORAL at 22:35

## 2018-09-20 RX ADMIN — Medication 650 MILLIGRAM(S): at 23:31

## 2018-09-20 RX ADMIN — CALCITRIOL 0.5 MICROGRAM(S): 0.5 CAPSULE ORAL at 14:34

## 2018-09-20 RX ADMIN — IRON SUCROSE 210 MILLIGRAM(S): 20 INJECTION, SOLUTION INTRAVENOUS at 06:03

## 2018-09-20 RX ADMIN — SEVELAMER CARBONATE 800 MILLIGRAM(S): 2400 POWDER, FOR SUSPENSION ORAL at 14:34

## 2018-09-20 RX ADMIN — ERYTHROPOIETIN 6000 UNIT(S): 10000 INJECTION, SOLUTION INTRAVENOUS; SUBCUTANEOUS at 17:21

## 2018-09-20 RX ADMIN — Medication 250 MILLIGRAM(S): at 14:34

## 2018-09-20 NOTE — PROGRESS NOTE ADULT - ASSESSMENT
1. CKD V/ESRD - now on HD  2. HTN   3. Anemia of CKD  4. MBD  5. Migraine      S/P CVC access today  HD today  Eventual LRD - Transplant  Will continue to follow

## 2018-09-20 NOTE — BRIEF OPERATIVE NOTE - PROCEDURE
<<-----Click on this checkbox to enter Procedure Permacath dialysis catheter insertion  09/20/2018  Right Internal Jugular Vein  Active  [email or web address]

## 2018-09-20 NOTE — PROGRESS NOTE ADULT - SUBJECTIVE AND OBJECTIVE BOX
Patient was seen and evaluated on dialysis.   No c/o CP SOB NV  no F/C  no swelling    T(C): 36.8 (09-20-18 @ 20:55), Max: 37.5 (09-20-18 @ 18:45)  HR: 75 (09-20-18 @ 20:55) (72 - 88)  BP: 125/80 (09-20-18 @ 20:55) (103/65 - 144/78)  Wt(kg): --  PE ;  NAD  lungs - CTA  CV gr 1 murmer,  No gallop or rub  Abd : soft, NT BS +, No masses  Ext- No edema  Neuro : Grossly intact, moving extremities                             9.7    3.2   )-----------( 198      ( 20 Sep 2018 07:30 )             30.3        09-20    140  |  99  |  87.0<H>  ----------------------------<  89  3.7   |  23.0  |  7.24<H>    Ca    9.0      20 Sep 2018 07:30  Phos  6.4     09-20  Mg     2.0     09-20        MEDICATIONS  (STANDING):  acetaminophen   Tablet .. PRN  amLODIPine   Tablet  ascorbic acid  Oral Tab/Cap - Peds  calcitriol   Capsule  carvedilol  docusate sodium  epoetin brianda Injectable  ergocalciferol  heparin  Injectable  influenza   Vaccine  iron sucrose IVPB  ondansetron    Tablet PRN  sevelamer hydrochloride      Patient stable  Ana HD easily  Continue

## 2018-09-20 NOTE — PROGRESS NOTE ADULT - SUBJECTIVE AND OBJECTIVE BOX
[facility] DIVISION OF KIDNEY DISEASES AND HYPERTENSION -- FOLLOW UP NOTE  --------------------------------------------------------------------------------  Chief Complaint:  CKD V/ESRD    24 hour events/subjective:  Pt seen and examined today  NAD  CVC placed today  Plan HD later today        PAST HISTORY  --------------------------------------------------------------------------------  No significant changes to PMH, PSH, FHx, SHx, unless otherwise noted    ALLERGIES & MEDICATIONS  --------------------------------------------------------------------------------  Allergies    No Known Allergies    Intolerances      Standing Inpatient Medications  amLODIPine   Tablet 10 milliGRAM(s) Oral daily  ascorbic acid  Oral Tab/Cap - Peds 250 milliGRAM(s) Oral daily  calcitriol   Capsule 0.5 MICROGram(s) Oral daily  carvedilol 25 milliGRAM(s) Oral every 12 hours  docusate sodium 100 milliGRAM(s) Oral two times a day  epoetin brianda Injectable 6000 Unit(s) IV Push <User Schedule>  ergocalciferol 26018 Unit(s) Oral every week  heparin  Injectable 5000 Unit(s) SubCutaneous every 12 hours  influenza   Vaccine 0.5 milliLiter(s) IntraMuscular once  iron sucrose IVPB 100 milliGRAM(s) IV Intermittent every 24 hours  PPD  5 Tuberculin Unit(s) Injectable 5 Unit(s) IntraDermal once  sevelamer hydrochloride 800 milliGRAM(s) Oral three times a day    PRN Inpatient Medications  acetaminophen   Tablet .. 650 milliGRAM(s) Oral every 6 hours PRN  ondansetron    Tablet 4 milliGRAM(s) Oral every 8 hours PRN      REVIEW OF SYSTEMS  --------------------------------------------------------------------------------  Gen: No weight changes, fatigue, fevers/chills, weakness  Skin: No rashes  Head/Eyes/Ears/Mouth: No headache; Normal hearing; Normal vision w/o blurriness; No sinus pain/discomfort, sore throat  Respiratory: No dyspnea, cough, wheezing, hemoptysis  CV: No chest pain, PND, orthopnea  GI: No abdominal pain, diarrhea, constipation, vomiting, melena, hematochezia, + nausea  : No increased frequency, dysuria, hematuria, nocturia  MSK: No joint pain/swelling; no back pain; no edema  Neuro: No dizziness/lightheadedness, weakness, seizures, numbness, tingling  Heme: No easy bruising or bleeding  Endo: No heat/cold intolerance  Psych: No significant nervousness, anxiety, stress, depression    All other systems were reviewed and are negative, except as noted.    VITALS/PHYSICAL EXAM  --------------------------------------------------------------------------------  T(C): 36.7 (09-20-18 @ 11:10), Max: 37 (09-20-18 @ 07:39)  HR: 83 (09-20-18 @ 11:10) (63 - 87)  BP: 126/76 (09-20-18 @ 11:10) (116/75 - 144/78)  RR: 20 (09-20-18 @ 11:10) (16 - 20)  SpO2: 100% (09-20-18 @ 07:39) (96% - 100%)  Wt(kg): --  Height (cm): 162.56 (09-18-18 @ 12:01)  Weight (kg): 49.9 (09-18-18 @ 12:01)  BMI (kg/m2): 18.9 (09-18-18 @ 12:01)  BSA (m2): 1.52 (09-18-18 @ 12:01)      09-19-18 @ 07:01  -  09-20-18 @ 07:00  --------------------------------------------------------  IN: 900 mL / OUT: 0 mL / NET: 900 mL      Physical Exam:  	Gen: NAD, pale, ill-appearing  	HEENT: PERRL, supple neck, clear oropharynx  	Pulm: CTA B/L  	CV: RRR, S1S2; no rub  	Back: No spinal or CVA tenderness; no sacral edema  	Abd: +BS, soft, nontender/nondistended  	: No suprapubic tenderness  	UE: Warm, FROM, no clubbing, intact strength; no edema; no asterixis  	LE: Warm, FROM, no clubbing, intact strength; 3 + edema  	Neuro: No focal deficits, intact gait  	Psych: Normal affect and mood  	Skin: Warm, without rashes  	Vascular access:  CVC    LABS/STUDIES  --------------------------------------------------------------------------------              9.7    3.2   >-----------<  198      [09-20-18 @ 07:30]              30.3     140  |  99  |  87.0  ----------------------------<  89      [09-20-18 @ 07:30]  3.7   |  23.0  |  7.24        Ca     9.0     [09-20-18 @ 07:30]      Mg     2.0     [09-20-18 @ 07:30]      Phos  6.4     [09-20-18 @ 07:30]    TPro  8.0  /  Alb  4.0  /  TBili  0.6  /  DBili  x   /  AST  12  /  ALT  24  /  AlkPhos  77  [09-18-18 @ 13:08]    PT/INR: PT 11.9 , INR 1.08       [09-19-18 @ 19:13]  PTT: 33.7       [09-19-18 @ 19:13]      Creatinine Trend:  SCr 7.24 [09-20 @ 07:30]  SCr 7.79 [09-19 @ 07:06]  SCr 7.39 [09-18 @ 22:48]  SCr 7.49 [09-18 @ 13:08]  SCr 6.31 [09-04 @ 08:26]    Urinalysis - [09-18-18 @ 14:01]      Color Yellow / Appearance Clear / SG 1.010 / pH 6.0      Gluc Negative / Ketone Negative  / Bili Negative / Urobili Negative       Blood Moderate / Protein 500 / Leuk Est Negative / Nitrite Negative      RBC 3-5 / WBC 3-5 / Hyaline  / Gran  / Sq Epi  / Non Sq Epi Occasional / Bacteria       Iron 45, TIBC 209, %sat 19      [08-31-18 @ 06:22]  Ferritin 58      [08-31-18 @ 06:22]  PTH -- (Ca 8.8)      [09-02-18 @ 14:18]   206  PTH -- (Ca 8.8)      [09-01-18 @ 19:52]   181  HbA1c 5.2      [08-31-18 @ 06:22]  TSH 2.15      [08-31-18 @ 06:22]  Lipid: chol 97, TG 66, HDL 39, LDL 45      [08-31-18 @ 06:22]    HBsAb Nonreact      [09-01-18 @ 20:51]  HBsAg Nonreact      [08-31-18 @ 18:18]  HBcAb Nonreact      [08-31-18 @ 18:18]  HCV 0.20, Nonreact      [08-31-18 @ 18:18]    ALBA: titer 1:160, pattern Homogeneous      [09-01-18 @ 20:54]  dsDNA 33      [09-01-18 @ 20:54]  C3 Complement 73      [09-01-18 @ 20:12]  C4 Complement 17      [09-01-18 @ 19:52]  ANCA: cANCA Negative, pANCA Negative, atypical ANCA Indeterminate Method interference due to ALBA fluorescence      [09-01-18 @ 20:54]  anti-GBM <0.2      [09-01-18 @ 23:27]  Syphilis Screen (Treponema Pallidum Ab) Negative      [09-01-18 @ 20:54]  PLA2R: SUMAYA 3, IFA Negative      [09-01-18 @ 23:27]  Free Light Chains: kappa 13.37, lambda 15.70, ratio = 0.85      [09-01 @ 19:52]  Immunofixation Serum:   No Monoclonal Band Identified      [09-01-18 @ 19:52]

## 2018-09-20 NOTE — PROGRESS NOTE ADULT - ASSESSMENT
34 yo female patient with h/o CKD not on HD, Migraines,  HTN presenting with pedal edema up to 1/3 of distal of lower extremities b/l, and  palpebral b/l edema for the last 3 days, concerning of worsening of kidney function.     Patient is clinically stable. Renal function is still in failure and decreasing compared to previous hospitalization. The patient refused to HD before but is willing to try HD.        PROBLEMS    Metabolic acidosis secondary to Chronic Kidney disease  ·	Patient had Kidney bx that shows sclerotic changes concerning of FSGS?  ·	Unclear etiology at the time, patient had mildly elevated dsDNA antibody and decreased C3  ·	bun Cr  7.49, increased of 18% compared to 09/03  ·	Sodium bicarbonate 18   ·	Hyperkalemia 5.7  ·	Hyperphosphatemia 6.6  ·	Patient refused HD on last admission  ·	Start sodium bicarbonate  Infusion 0.225 mEq/kG/Hr (75 mL/Hr) IV Continuous  ·	Ondansetron 4mg IV PRN nausea/vomiting   ·	calcitriol   Capsule 0.5 MICROGram(s) Oral daily  ·	ergocalciferol 94610 Unit(s) Oral every week  ·	Nephro eval noted and appreciated --> HD on 09/20 after permcath placement.   ·	Vascular surgery eval noted and appreciated   ·	NPO after midnight in preparation for PermCath on 9/20/18    Anemia of chronic disease  ·	Hb w/o change from last admission.  ·	d/c ferrous sulfate Oral [id number] milliGRAM(s) Oral daily  ·	Start iron sucrose IVPB 100 milliGRAM(s) IV Intermittent every 24 hour  ·	c/w ascorbic acid  Oral Tab/Cap - Peds 250 milliGRAM(s) Oral daily    Hypertension  ·	Stable  ·	c/w carvedilol 25 milliGRAM(s) Oral every 12 hours  ·	c/w amlodipine 10 mg Oral qd    Migraines  ·	Asymptomatic  ·	Tylenol 650 mg PO q6 hours PRN       Prophylactic measures  ·	heparin  Injectable 5000 Unit(s) SubCutaneous every 12 hours 32 yo female patient with h/o CKD not on HD, Migraines,  HTN presenting with pedal edema up to 1/3 of distal of lower extremities b/l, and  palpebral b/l edema for the last 3 days, concerning of worsening of kidney function.     Patient is clinically stable. Renal function is still in failure and decreasing compared to previous hospitalization. Patient is on S/P CVC access and HD schedule for today.        PROBLEMS    Metabolic acidosis secondary to Chronic Kidney disease  ·	Patient had Kidney bx that shows sclerotic changes concerning of FSGS?  ·	Unclear etiology at the time, patient had mildly elevated dsDNA antibody and decreased C3  ·	bun Cr  7.49, increased of 18% compared to 09/03  ·	Sodium bicarbonate 18   ·	Hyperkalemia 5.7  ·	Hyperphosphatemia 6.6  ·	Patient refused HD on last admission  ·	Start sodium bicarbonate  Infusion 0.225 mEq/kG/Hr (75 mL/Hr) IV Continuous  ·	Ondansetron 4mg IV PRN nausea/vomiting   ·	calcitriol   Capsule 0.5 MICROGram(s) Oral daily  ·	ergocalciferol [id number] Unit(s) Oral every week  ·	S/P CVC access today  ·	HD schedule for 09/20/2018  ·	Nephro eval noted and appreciated   ·	Vascular surgery eval noted and appreciated       Anemia of chronic disease  ·	Hb w/o change from last admission.  ·	d/c ferrous sulfate Oral [id number] milliGRAM(s) Oral daily  ·	Start iron sucrose IVPB 100 milliGRAM(s) IV Intermittent every 24 hour  ·	c/w ascorbic acid  Oral Tab/Cap - Peds 250 milliGRAM(s) Oral daily    Hypertension  ·	Stable  ·	c/w carvedilol 25 milliGRAM(s) Oral every 12 hours  ·	c/w amlodipine 10 mg Oral qd    Migraines  ·	Asymptomatic  ·	Tylenol 650 mg PO q6 hours PRN       Prophylactic measures  ·	heparin  Injectable 5000 Unit(s) SubCutaneous every 12 hours 34 yo female patient with h/o CKD not on HD, Migraines,  HTN presenting with pedal edema up to 1/3 of distal of lower extremities b/l, and  palpebral b/l edema for the last 3 days, concerning of worsening of kidney function.     Patient is clinically stable. Renal function is still in failure and decreasing compared to previous hospitalization. Patient is on S/P CVC access and HD schedule for today.        PROBLEMS    Metabolic acidosis secondary to Chronic Kidney disease  ·	Patient had Kidney bx that shows sclerotic changes concerning of FSGS?  ·	Unclear etiology at the time, patient had mildly elevated dsDNA antibody and decreased C3  ·	On admission bun Cr  7.49, increased of 18% compared to 09/03, Hyperkalemia 5.7, Hyperphosphatemia 6.6  ·	S/P CVC access 09/20/2018  ·	S/p HD 09/20/2018  ·	d/c sodium bicarbonate  Infusion  ·	Hyperkalemia was corrected  ·	Ondansetron 4mg IV PRN nausea/vomiting   ·	calcitriol   Capsule 0.5 MICROGram(s) Oral daily  ·	ergocalciferol [id number] Unit(s) Oral every week  ·	f/u cbc, cmp, Mg, Phosp on AM  ·	Nephro eval noted and appreciated   ·	Vascular surgery eval noted and appreciated       Anemia of chronic disease  ·	Hb w/o change from last admission.  ·	d/c ferrous sulfate Oral [id number] milliGRAM(s) Oral daily  ·	Start iron sucrose IVPB 100 milliGRAM(s) IV Intermittent every 24 hour  ·	c/w ascorbic acid  Oral Tab/Cap - Peds 250 milliGRAM(s) Oral daily    Hypertension  ·	Stable  ·	c/w carvedilol 25 milliGRAM(s) Oral every 12 hours  ·	c/w amlodipine 10 mg Oral qd    Migraines  ·	Asymptomatic  ·	Tylenol 650 mg PO q6 hours PRN       Prophylactic measures  ·	heparin  Injectable 5000 Unit(s) SubCutaneous every 12 hours

## 2018-09-20 NOTE — PROGRESS NOTE ADULT - SUBJECTIVE AND OBJECTIVE BOX
cc: Patient is a 33y old  Female who presents with a chief complaint of Abnormal labs (18 Sep 2018 17:12)        INTERVAL/OVERNIGHT EVENTS:    Patient examined at beside. No acute events over night.   Patient refers felling better compared to admission, regarding pedal and palpebral edema. Patient denies nausea. Patient is tolerating PO diet, states she is voiding and last BM yesterday. Patient is ambulating. Patient denies chest pain, calf pain, abdominal pain, headaches, fever, chills, dysuria, hematuria, diarrhea, constipation, SOB, palpitations. Rest of ROS not contributory except for above.      CARDIAC MONITOR: no events.      Vitals  T(C): 36.8 (20 Sep 2018 04:50), Max: 36.8 (19 Sep 2018 07:46)  T(F): 98.2 (20 Sep 2018 04:50), Max: 98.3 (19 Sep 2018 07:46)  HR: 63 (20 Sep 2018 04:50) (63 - 74)  BP: 122/76 (20 Sep 2018 04:50) (109/68 - 128/69)  RR: 20 (19 Sep 2018 20:06) (18 - 20)  SpO2: 99% (20 Sep 2018 04:50) (96% - 100%)    Physical Exam:   GENERAL: NAD, well-groomed, well-developed. Cooperative. Pallor  HEAD:  Atraumatic, Normocephalic. No tenderness on palpation  EYES: EOMI, PERRLA, conjunctiva and sclera clear. Mild palpebral edema.  ENMT: No tonsillar erythema, exudates, or enlargement; Moist mucous membranes.  No lesions  NECK: Supple, No JVD, thyroid non visible, non palpable  NERVOUS SYSTEM:  Alert & Oriented X3, Good concentration; Motor Strength 5/5 B/L upper and lower extremities;   RESP: Clear to auscultation bilaterally; No rales, rhonchi, wheezing, or rubs  CVS: Regular rate and rhythm; No murmurs, rubs, or gallops. Capillary refill <2s  GI: Soft, Nontender, Nondistended; Bowel sounds present  EXTREMITIES:  2+ Peripheral Pulses, No clubbing, cyanosis, or edema.   LYMPH: No cervical or supraclavicular lymphadenopathy noted  SKIN: No rashes or lesions      LABS    Complete blood count  _________________________________________________________                      10.7   3.1   )-----------( 241      ( 18 Sep 2018 13:08 )             32.8       Chemistry  _____________________________________________________________    09-19    139  |  99  |  92.0<H>  ----------------------------<  104  3.8   |  25.0  |  7.79<H>    Ca    9.1      19 Sep 2018 07:06  Phos  6.2     09-19  Mg     2.2     09-19    TPro  8.0  /  Alb  4.0  /  TBili  0.6  /  DBili  x   /  AST  12  /  ALT  24  /  AlkPhos  77  09-18 09-18    141  |  102  |  89.0<H>  ----------------------------<  105  4.0   |  23.0  |  7.39<H>    Ca    9.1      18 Sep 2018 22:48  Phos  6.6     09-18  Mg     2.5     09-18    TPro  8.0  /  Alb  4.0  /  TBili  0.6  /  DBili  x   /  AST  12  /  ALT  24  /  AlkPhos  77  09-18      LIVER FUNCTIONS - ( 18 Sep 2018 13:08 )  Alb: 4.0 g/dL / Pro: 8.0 g/dL / ALK PHOS: 77 U/L / ALT: 24 U/L / AST: 12 U/L / GGT: x               DIET:  -Regular With renal restrictions  -Will be NPO after midnight.    MEDICATIONS  (STANDING):  amLODIPine   Tablet 10 milliGRAM(s) Oral daily  ascorbic acid  Oral Tab/Cap - Peds 250 milliGRAM(s) Oral daily  calcitriol   Capsule 0.5 MICROGram(s) Oral daily  carvedilol 25 milliGRAM(s) Oral every 12 hours  docusate sodium 100 milliGRAM(s) Oral two times a day  ergocalciferol 17686 Unit(s) Oral every week  heparin  Injectable 5000 Unit(s) SubCutaneous every 12 hours  influenza   Vaccine 0.5 milliLiter(s) IntraMuscular once  iron sucrose IVPB 100 milliGRAM(s) IV Intermittent every 24 hours  sodium bicarbonate  Infusion 0.225 mEq/kG/Hr (75 mL/Hr) IV Continuous <Continuous>    MEDICATIONS  (PRN):  acetaminophen   Tablet .. 650 milliGRAM(s) Oral every 6 hours PRN Mild Pain (1 - 3), Moderate Pain (4 - 6)  ondansetron    Tablet 4 milliGRAM(s) Oral every 8 hours PRN Nausea and/or Vomiting cc: Patient is a 33y old  Female who presents with a chief complaint of Abnormal labs (18 Sep 2018 17:12)        INTERVAL/OVERNIGHT EVENTS:    As per nurse, No acute events over night.   Patient was not in room, already in OR for permcath placement         CARDIAC MONITOR: no events.      Vitals  T(C): 36.8 (20 Sep 2018 04:50), Max: 36.8 (19 Sep 2018 07:46)  T(F): 98.2 (20 Sep 2018 04:50), Max: 98.3 (19 Sep 2018 07:46)  HR: 63 (20 Sep 2018 04:50) (63 - 74)  BP: 122/76 (20 Sep 2018 04:50) (109/68 - 128/69)  RR: 20 (19 Sep 2018 20:06) (18 - 20)  SpO2: 99% (20 Sep 2018 04:50) (96% - 100%)    Physical Exam:   Not in the room. Will follow up after procedure.      LABS    Complete blood count  _________________________________________________________                          9.7    3.2   )-----------( 198      ( 20 Sep 2018 07:30 )             30.3     Mean Cell Volume : 82.6 fl  Mean Cell Hemoglobin : 26.4 pg  Mean Cell Hemoglobin Concentration : 32.0 g/dL                       10.7   3.1   )-----------( 241      ( 18 Sep 2018 13:08 )             32.8       Chemistry  _____________________________________________________________    09-20    140  |  99  |  87.0<H>  ----------------------------<  89  3.7   |  23.0  |  7.24<H>    Ca    9.0      20 Sep 2018 07:30  Phos  6.4     09-20  Mg     2.0     09-20    TPro  8.0  /  Alb  4.0  /  TBili  0.6  /  DBili  x   /  AST  12  /  ALT  24  /  AlkPhos  77  09-18 09-19    139  |  99  |  92.0<H>  ----------------------------<  104  3.8   |  25.0  |  7.79<H>    Ca    9.1      19 Sep 2018 07:06  Phos  6.2     09-19  Mg     2.2     09-19    TPro  8.0  /  Alb  4.0  /  TBili  0.6  /  DBili  x   /  AST  12  /  ALT  24  /  AlkPhos  77  09-18 09-18    141  |  102  |  89.0<H>  ----------------------------<  105  4.0   |  23.0  |  7.39<H>    Ca    9.1      18 Sep 2018 22:48  Phos  6.6     09-18  Mg     2.5     09-18    TPro  8.0  /  Alb  4.0  /  TBili  0.6  /  DBili  x   /  AST  12  /  ALT  24  /  AlkPhos  77  09-18      LIVER FUNCTIONS - ( 18 Sep 2018 13:08 )  Alb: 4.0 g/dL / Pro: 8.0 g/dL / ALK PHOS: 77 U/L / ALT: 24 U/L / AST: 12 U/L / GGT: x               DIET:  -Regular With renal restrictions  -Will be NPO after midnight.    MEDICATIONS  (STANDING):  amLODIPine   Tablet 10 milliGRAM(s) Oral daily  ascorbic acid  Oral Tab/Cap - Peds 250 milliGRAM(s) Oral daily  calcitriol   Capsule 0.5 MICROGram(s) Oral daily  carvedilol 25 milliGRAM(s) Oral every 12 hours  docusate sodium 100 milliGRAM(s) Oral two times a day  ergocalciferol 86279 Unit(s) Oral every week  heparin  Injectable 5000 Unit(s) SubCutaneous every 12 hours  influenza   Vaccine 0.5 milliLiter(s) IntraMuscular once  iron sucrose IVPB 100 milliGRAM(s) IV Intermittent every 24 hours  sodium bicarbonate  Infusion 0.225 mEq/kG/Hr (75 mL/Hr) IV Continuous <Continuous>    MEDICATIONS  (PRN):  acetaminophen   Tablet .. 650 milliGRAM(s) Oral every 6 hours PRN Mild Pain (1 - 3), Moderate Pain (4 - 6)  ondansetron    Tablet 4 milliGRAM(s) Oral every 8 hours PRN Nausea and/or Vomiting cc: Patient is a 33y old  Female who presents with a chief complaint of Abnormal labs (18 Sep 2018 17:12)        INTERVAL/OVERNIGHT EVENTS:  Patient examined at bedside.  As per nurse, No acute events over night.   Patient states felling better compared to admission and is at her baseline, refers mild pain on CVC access. Started with liquids and is tolerating well w/o nausea/vomiting. Patient states she is ready for HD but scared of what could happened. Asked what was the proximal step regarding IV access for future HD sesions and was explained about the differences between the permcath and the AV fistula. patient is ambulating to bathroom. Denies chest pain, SOB, fevers, chills, palpitations, dysuria hematuria.        CARDIAC MONITOR: no events.      Vitals  T(C): 36.8 (20 Sep 2018 04:50), Max: 36.8 (19 Sep 2018 07:46)  T(F): 98.2 (20 Sep 2018 04:50), Max: 98.3 (19 Sep 2018 07:46)  HR: 63 (20 Sep 2018 04:50) (63 - 74)  BP: 122/76 (20 Sep 2018 04:50) (109/68 - 128/69)  RR: 20 (19 Sep 2018 20:06) (18 - 20)  SpO2: 99% (20 Sep 2018 04:50) (96% - 100%)    Physical Exam:   Not in the room. Will follow up after procedure.      LABS    Complete blood count  _________________________________________________________                          9.7    3.2   )-----------( 198      ( 20 Sep 2018 07:30 )             30.3     Mean Cell Volume : 82.6 fl  Mean Cell Hemoglobin : 26.4 pg  Mean Cell Hemoglobin Concentration : 32.0 g/dL                       10.7   3.1   )-----------( 241      ( 18 Sep 2018 13:08 )             32.8       Chemistry  _____________________________________________________________    09-20    140  |  99  |  87.0<H>  ----------------------------<  89  3.7   |  23.0  |  7.24<H>    Ca    9.0      20 Sep 2018 07:30  Phos  6.4     09-20  Mg     2.0     09-20    TPro  8.0  /  Alb  4.0  /  TBili  0.6  /  DBili  x   /  AST  12  /  ALT  24  /  AlkPhos  77  09-18 09-19    139  |  99  |  92.0<H>  ----------------------------<  104  3.8   |  25.0  |  7.79<H>    Ca    9.1      19 Sep 2018 07:06  Phos  6.2     09-19  Mg     2.2     09-19    TPro  8.0  /  Alb  4.0  /  TBili  0.6  /  DBili  x   /  AST  12  /  ALT  24  /  AlkPhos  77  09-18 09-18    141  |  102  |  89.0<H>  ----------------------------<  105  4.0   |  23.0  |  7.39<H>    Ca    9.1      18 Sep 2018 22:48  Phos  6.6     09-18  Mg     2.5     09-18    TPro  8.0  /  Alb  4.0  /  TBili  0.6  /  DBili  x   /  AST  12  /  ALT  24  /  AlkPhos  77  09-18      LIVER FUNCTIONS - ( 18 Sep 2018 13:08 )  Alb: 4.0 g/dL / Pro: 8.0 g/dL / ALK PHOS: 77 U/L / ALT: 24 U/L / AST: 12 U/L / GGT: x               DIET:  -Regular With renal restrictions  -Will be NPO after midnight.    MEDICATIONS  (STANDING):  amLODIPine   Tablet 10 milliGRAM(s) Oral daily  ascorbic acid  Oral Tab/Cap - Peds 250 milliGRAM(s) Oral daily  calcitriol   Capsule 0.5 MICROGram(s) Oral daily  carvedilol 25 milliGRAM(s) Oral every 12 hours  docusate sodium 100 milliGRAM(s) Oral two times a day  ergocalciferol 92965 Unit(s) Oral every week  heparin  Injectable 5000 Unit(s) SubCutaneous every 12 hours  influenza   Vaccine 0.5 milliLiter(s) IntraMuscular once  iron sucrose IVPB 100 milliGRAM(s) IV Intermittent every 24 hours  sodium bicarbonate  Infusion 0.225 mEq/kG/Hr (75 mL/Hr) IV Continuous <Continuous>    MEDICATIONS  (PRN):  acetaminophen   Tablet .. 650 milliGRAM(s) Oral every 6 hours PRN Mild Pain (1 - 3), Moderate Pain (4 - 6)  ondansetron    Tablet 4 milliGRAM(s) Oral every 8 hours PRN Nausea and/or Vomiting cc: Patient is a 33y old  Female who presents with a chief complaint of Abnormal labs (18 Sep 2018 17:12)    Followed by:  -->Nephrology: Dr. Smyth  --> Cardiology: Dr. Edmonds  --> Vascular surgery:       INTERVAL/OVERNIGHT EVENTS:  Patient examined at bedside.  As per nurse, No acute events over night.   Patient states felling better compared to admission and is at her baseline, refers mild pain on CVC access. Started with liquids and is tolerating well w/o nausea/vomiting. Patient states she is ready for HD but scared of what could happened. Asked what was the proximal step regarding IV access for future HD sesions and was explained about the differences between the permcath and the AV fistula. patient is ambulating to bathroom. Denies chest pain, SOB, fevers, chills, palpitations, dysuria hematuria.        CARDIAC MONITOR: no events.      Vitals  T(C): 36.8 (20 Sep 2018 04:50), Max: 36.8 (19 Sep 2018 07:46)  T(F): 98.2 (20 Sep 2018 04:50), Max: 98.3 (19 Sep 2018 07:46)  HR: 63 (20 Sep 2018 04:50) (63 - 74)  BP: 122/76 (20 Sep 2018 04:50) (109/68 - 128/69)  RR: 20 (19 Sep 2018 20:06) (18 - 20)  SpO2: 99% (20 Sep 2018 04:50) (96% - 100%)    Physical Exam:   Not in the room. Will follow up after procedure.      LABS    Complete blood count  _________________________________________________________                          9.7    3.2   )-----------( 198      ( 20 Sep 2018 07:30 )             30.3     Mean Cell Volume : 82.6 fl  Mean Cell Hemoglobin : 26.4 pg  Mean Cell Hemoglobin Concentration : 32.0 g/dL                       10.7   3.1   )-----------( 241      ( 18 Sep 2018 13:08 )             32.8       Chemistry  _____________________________________________________________    09-20    140  |  99  |  87.0<H>  ----------------------------<  89  3.7   |  23.0  |  7.24<H>    Ca    9.0      20 Sep 2018 07:30  Phos  6.4     09-20  Mg     2.0     09-20    TPro  8.0  /  Alb  4.0  /  TBili  0.6  /  DBili  x   /  AST  12  /  ALT  24  /  AlkPhos  77  09-18 09-19    139  |  99  |  92.0<H>  ----------------------------<  104  3.8   |  25.0  |  7.79<H>    Ca    9.1      19 Sep 2018 07:06  Phos  6.2     09-19  Mg     2.2     09-19    TPro  8.0  /  Alb  4.0  /  TBili  0.6  /  DBili  x   /  AST  12  /  ALT  24  /  AlkPhos  77  09-18 09-18    141  |  102  |  89.0<H>  ----------------------------<  105  4.0   |  23.0  |  7.39<H>    Ca    9.1      18 Sep 2018 22:48  Phos  6.6     09-18  Mg     2.5     09-18    TPro  8.0  /  Alb  4.0  /  TBili  0.6  /  DBili  x   /  AST  12  /  ALT  24  /  AlkPhos  77  09-18      LIVER FUNCTIONS - ( 18 Sep 2018 13:08 )  Alb: 4.0 g/dL / Pro: 8.0 g/dL / ALK PHOS: 77 U/L / ALT: 24 U/L / AST: 12 U/L / GGT: x               DIET:  -Regular With renal restrictions  -Will be NPO after midnight.    MEDICATIONS  (STANDING):  amLODIPine   Tablet 10 milliGRAM(s) Oral daily  ascorbic acid  Oral Tab/Cap - Peds 250 milliGRAM(s) Oral daily  calcitriol   Capsule 0.5 MICROGram(s) Oral daily  carvedilol 25 milliGRAM(s) Oral every 12 hours  docusate sodium 100 milliGRAM(s) Oral two times a day  ergocalciferol 65260 Unit(s) Oral every week  heparin  Injectable 5000 Unit(s) SubCutaneous every 12 hours  influenza   Vaccine 0.5 milliLiter(s) IntraMuscular once  iron sucrose IVPB 100 milliGRAM(s) IV Intermittent every 24 hours  sodium bicarbonate  Infusion 0.225 mEq/kG/Hr (75 mL/Hr) IV Continuous <Continuous>    MEDICATIONS  (PRN):  acetaminophen   Tablet .. 650 milliGRAM(s) Oral every 6 hours PRN Mild Pain (1 - 3), Moderate Pain (4 - 6)  ondansetron    Tablet 4 milliGRAM(s) Oral every 8 hours PRN Nausea and/or Vomiting cc: Patient is a 33y old  Female who presents with a chief complaint of Abnormal labs (18 Sep 2018 17:12)    Followed by:  -->Nephrology: Dr. Smyth  --> Cardiology: Dr. Edmonds  --> Vascular surgery:       INTERVAL/OVERNIGHT EVENTS:  Patient examined at bedside.  As per nurse, No acute events over night.   Patient states felling better compared to admission and is at her baseline, refers mild pain on CVC access. Started with liquids and is tolerating well w/o nausea/vomiting. Patient states she is ready for HD but scared of what could happened. Asked what was the proximal step regarding IV access for future HD sesions and was explained about the differences between the permcath and the AV fistula. patient is ambulating to bathroom. Denies chest pain, SOB, fevers, chills, palpitations, dysuria hematuria.        CARDIAC MONITOR: no events.      Vitals  T(C): 36.8 (20 Sep 2018 04:50), Max: 36.8 (19 Sep 2018 07:46)  T(F): 98.2 (20 Sep 2018 04:50), Max: 98.3 (19 Sep 2018 07:46)  HR: 63 (20 Sep 2018 04:50) (63 - 74)  BP: 122/76 (20 Sep 2018 04:50) (109/68 - 128/69)  RR: 20 (19 Sep 2018 20:06) (18 - 20)  SpO2: 99% (20 Sep 2018 04:50) (96% - 100%)    Physical Exam:      GENERAL: NAD, well-groomed, well-developed  HEAD:  Atraumatic, Normocephalic  EYES: EOMI, PERRLA, conjunctiva and sclera clear. Mild palpebral edema.   ENMT: No tonsillar erythema, exudates, or enlargement; Moist mucous membranes, Good dentition, No lesions  NECK: Supple, No JVD, Normal thyroid  NERVOUS SYSTEM:  Alert & Oriented X3, Good concentration; Motor Strength 5/5 B/L upper and lower extremities;   RESP: CVC access covered with clean and dry dressing Clear to auscultation bilaterally; No rales, rhonchi, wheezing, or rubs  CVS: Regular rate and rhythm; No murmurs, rubs, or gallops  GI: Soft, Nontender, Nondistended; Bowel sounds present  EXTREMITIES:  2+ Peripheral Pulses, No clubbing, cyanosis, or edema  LYMPH: No cervical or supraclavicular lymphadenopathy noted  SKIN: No rashes or lesions. Face pallor and conjunctival pallor.           LABS    Complete blood count  _________________________________________________________                          9.7    3.2   )-----------( 198      ( 20 Sep 2018 07:30 )             30.3     Mean Cell Volume : 82.6 fl  Mean Cell Hemoglobin : 26.4 pg  Mean Cell Hemoglobin Concentration : 32.0 g/dL                       10.7   3.1   )-----------( 241      ( 18 Sep 2018 13:08 )             32.8       Chemistry  _____________________________________________________________    09-20    140  |  99  |  87.0<H>  ----------------------------<  89  3.7   |  23.0  |  7.24<H>    Ca    9.0      20 Sep 2018 07:30  Phos  6.4     09-20  Mg     2.0     09-20    TPro  8.0  /  Alb  4.0  /  TBili  0.6  /  DBili  x   /  AST  12  /  ALT  24  /  AlkPhos  77  09-18 09-19    139  |  99  |  92.0<H>  ----------------------------<  104  3.8   |  25.0  |  7.79<H>    Ca    9.1      19 Sep 2018 07:06  Phos  6.2     09-19  Mg     2.2     09-19    TPro  8.0  /  Alb  4.0  /  TBili  0.6  /  DBili  x   /  AST  12  /  ALT  24  /  AlkPhos  77  09-18 09-18    141  |  102  |  89.0<H>  ----------------------------<  105  4.0   |  23.0  |  7.39<H>    Ca    9.1      18 Sep 2018 22:48  Phos  6.6     09-18  Mg     2.5     09-18    TPro  8.0  /  Alb  4.0  /  TBili  0.6  /  DBili  x   /  AST  12  /  ALT  24  /  AlkPhos  77  09-18      LIVER FUNCTIONS - ( 18 Sep 2018 13:08 )  Alb: 4.0 g/dL / Pro: 8.0 g/dL / ALK PHOS: 77 U/L / ALT: 24 U/L / AST: 12 U/L / GGT: x               DIET:  -Regular With renal restrictions  -Will be NPO after midnight.    MEDICATIONS  (STANDING):  amLODIPine   Tablet 10 milliGRAM(s) Oral daily  ascorbic acid  Oral Tab/Cap - Peds 250 milliGRAM(s) Oral daily  calcitriol   Capsule 0.5 MICROGram(s) Oral daily  carvedilol 25 milliGRAM(s) Oral every 12 hours  docusate sodium 100 milliGRAM(s) Oral two times a day  ergocalciferol 56634 Unit(s) Oral every week  heparin  Injectable 5000 Unit(s) SubCutaneous every 12 hours  influenza   Vaccine 0.5 milliLiter(s) IntraMuscular once  iron sucrose IVPB 100 milliGRAM(s) IV Intermittent every 24 hours  sodium bicarbonate  Infusion 0.225 mEq/kG/Hr (75 mL/Hr) IV Continuous <Continuous>    MEDICATIONS  (PRN):  acetaminophen   Tablet .. 650 milliGRAM(s) Oral every 6 hours PRN Mild Pain (1 - 3), Moderate Pain (4 - 6)  ondansetron    Tablet 4 milliGRAM(s) Oral every 8 hours PRN Nausea and/or Vomiting

## 2018-09-20 NOTE — CHART NOTE - NSCHARTNOTEFT_GEN_A_CORE
Post-Op check    A:  32 y/o F s/p Right Internal Jugular Vein Permacath placement by Dr. Espinosa on 9/20/18. Patient was evaluated at bedside and was found afebrile, hemodynamically stable and in no acute distress; stable conditions. Thus far the patient post-op period has been unremarkable. Patient is tolerating diet, voiding and pain well control. Denies nausea, vomiting, fever, chest pain, dizziness or SOB.    O:  Gen: AAOx3, NAD  HEENT: Right IJ Permacath in place with dressing clean and dry. No swelling, erythema or sign of infection surrounding the catheter.     A/P:  32 y/o F s/p Right Internal Jugular Vein Permacath placement. Patient tolerated the procedure well and will undergo HD today 9/20.

## 2018-09-21 ENCOUNTER — TRANSCRIPTION ENCOUNTER (OUTPATIENT)
Age: 33
End: 2018-09-21

## 2018-09-21 LAB
ANION GAP SERPL CALC-SCNC: 12 MMOL/L — SIGNIFICANT CHANGE UP (ref 5–17)
ANISOCYTOSIS BLD QL: SLIGHT — SIGNIFICANT CHANGE UP
BASOPHILS # BLD AUTO: 0 K/UL — SIGNIFICANT CHANGE UP (ref 0–0.2)
BASOPHILS NFR BLD AUTO: 0.3 % — SIGNIFICANT CHANGE UP (ref 0–2)
BUN SERPL-MCNC: 47 MG/DL — HIGH (ref 8–20)
CALCIUM SERPL-MCNC: 8.7 MG/DL — SIGNIFICANT CHANGE UP (ref 8.6–10.2)
CHLORIDE SERPL-SCNC: 104 MMOL/L — SIGNIFICANT CHANGE UP (ref 98–107)
CO2 SERPL-SCNC: 23 MMOL/L — SIGNIFICANT CHANGE UP (ref 22–29)
CREAT SERPL-MCNC: 5.81 MG/DL — HIGH (ref 0.5–1.3)
EOSINOPHIL # BLD AUTO: 0 K/UL — SIGNIFICANT CHANGE UP (ref 0–0.5)
EOSINOPHIL NFR BLD AUTO: 1 % — SIGNIFICANT CHANGE UP (ref 0–5)
GLUCOSE SERPL-MCNC: 91 MG/DL — SIGNIFICANT CHANGE UP (ref 70–115)
HCT VFR BLD CALC: 29.2 % — LOW (ref 37–47)
HGB BLD-MCNC: 9.2 G/DL — LOW (ref 12–16)
HYPOCHROMIA BLD QL: SLIGHT — SIGNIFICANT CHANGE UP
LYMPHOCYTES # BLD AUTO: 1 K/UL — SIGNIFICANT CHANGE UP (ref 1–4.8)
LYMPHOCYTES # BLD AUTO: 30 % — SIGNIFICANT CHANGE UP (ref 20–55)
MACROCYTES BLD QL: SLIGHT — SIGNIFICANT CHANGE UP
MAGNESIUM SERPL-MCNC: 2.1 MG/DL — SIGNIFICANT CHANGE UP (ref 1.6–2.6)
MCHC RBC-ENTMCNC: 26.5 PG — LOW (ref 27–31)
MCHC RBC-ENTMCNC: 31.5 G/DL — LOW (ref 32–36)
MCV RBC AUTO: 84.1 FL — SIGNIFICANT CHANGE UP (ref 81–99)
MICROCYTES BLD QL: SLIGHT — SIGNIFICANT CHANGE UP
MONOCYTES # BLD AUTO: 0.6 K/UL — SIGNIFICANT CHANGE UP (ref 0–0.8)
MONOCYTES NFR BLD AUTO: 18 % — HIGH (ref 3–10)
NEUTROPHILS # BLD AUTO: 1.6 K/UL — LOW (ref 1.8–8)
NEUTROPHILS NFR BLD AUTO: 50 % — SIGNIFICANT CHANGE UP (ref 37–73)
OVALOCYTES BLD QL SMEAR: SLIGHT — SIGNIFICANT CHANGE UP
PHOSPHATE SERPL-MCNC: 4.6 MG/DL — SIGNIFICANT CHANGE UP (ref 2.4–4.7)
PLAT MORPH BLD: NORMAL — SIGNIFICANT CHANGE UP
PLATELET # BLD AUTO: 164 K/UL — SIGNIFICANT CHANGE UP (ref 150–400)
POIKILOCYTOSIS BLD QL AUTO: SLIGHT — SIGNIFICANT CHANGE UP
POTASSIUM SERPL-MCNC: 4.3 MMOL/L — SIGNIFICANT CHANGE UP (ref 3.5–5.3)
POTASSIUM SERPL-SCNC: 4.3 MMOL/L — SIGNIFICANT CHANGE UP (ref 3.5–5.3)
RBC # BLD: 3.47 M/UL — LOW (ref 4.4–5.2)
RBC # FLD: 14.3 % — SIGNIFICANT CHANGE UP (ref 11–15.6)
RBC BLD AUTO: ABNORMAL
SODIUM SERPL-SCNC: 139 MMOL/L — SIGNIFICANT CHANGE UP (ref 135–145)
VARIANT LYMPHS # BLD: 1 % — SIGNIFICANT CHANGE UP (ref 0–6)
WBC # BLD: 3.1 K/UL — LOW (ref 4.8–10.8)
WBC # FLD AUTO: 3.1 K/UL — LOW (ref 4.8–10.8)

## 2018-09-21 PROCEDURE — 99233 SBSQ HOSP IP/OBS HIGH 50: CPT | Mod: GC

## 2018-09-21 PROCEDURE — 90937 HEMODIALYSIS REPEATED EVAL: CPT

## 2018-09-21 PROCEDURE — 99497 ADVNCD CARE PLAN 30 MIN: CPT

## 2018-09-21 RX ADMIN — SEVELAMER CARBONATE 800 MILLIGRAM(S): 2400 POWDER, FOR SUSPENSION ORAL at 06:06

## 2018-09-21 RX ADMIN — HEPARIN SODIUM 5000 UNIT(S): 5000 INJECTION INTRAVENOUS; SUBCUTANEOUS at 06:07

## 2018-09-21 RX ADMIN — CALCITRIOL 0.5 MICROGRAM(S): 0.5 CAPSULE ORAL at 13:23

## 2018-09-21 RX ADMIN — IRON SUCROSE 210 MILLIGRAM(S): 20 INJECTION, SOLUTION INTRAVENOUS at 06:07

## 2018-09-21 RX ADMIN — SEVELAMER CARBONATE 800 MILLIGRAM(S): 2400 POWDER, FOR SUSPENSION ORAL at 13:24

## 2018-09-21 RX ADMIN — CARVEDILOL PHOSPHATE 25 MILLIGRAM(S): 80 CAPSULE, EXTENDED RELEASE ORAL at 06:06

## 2018-09-21 RX ADMIN — HEPARIN SODIUM 5000 UNIT(S): 5000 INJECTION INTRAVENOUS; SUBCUTANEOUS at 18:55

## 2018-09-21 RX ADMIN — Medication 650 MILLIGRAM(S): at 21:17

## 2018-09-21 RX ADMIN — Medication 100 MILLIGRAM(S): at 06:06

## 2018-09-21 RX ADMIN — Medication 250 MILLIGRAM(S): at 13:24

## 2018-09-21 RX ADMIN — Medication 650 MILLIGRAM(S): at 22:12

## 2018-09-21 RX ADMIN — Medication 650 MILLIGRAM(S): at 00:30

## 2018-09-21 RX ADMIN — SEVELAMER CARBONATE 800 MILLIGRAM(S): 2400 POWDER, FOR SUSPENSION ORAL at 21:11

## 2018-09-21 RX ADMIN — CARVEDILOL PHOSPHATE 25 MILLIGRAM(S): 80 CAPSULE, EXTENDED RELEASE ORAL at 18:55

## 2018-09-21 RX ADMIN — AMLODIPINE BESYLATE 10 MILLIGRAM(S): 2.5 TABLET ORAL at 06:06

## 2018-09-21 NOTE — DISCHARGE NOTE ADULT - PLAN OF CARE
to continue with hemodialysis Continue following a renal diet as advised by your Nephrologist. If you have been started on any medications for your kidney function, be sure to take them regularly. Be sure to follow up and have dialysis as scheduled, if it very important that you do not miss sessions! If something comes up and you feel that you cannot make a session, please alert your nephrologist immediately. to keep blood pressure under <140/90 mmHg Be sure to follow a low salt diet. If you have been prescribed antihypertensive medications to control your blood pressure, be sure to take them every day as prescribed and do not miss any doses, the medications do not work if they are not taken consistently. Follow up with your Primary Care Doctor and have your Blood Pressure checked. Should you have similar symptoms/headache again, you should try taking Tylenol. DO NOT TAKE over the counter Non steroidal anti inflammatory drugs (NSAID) as the y may worsen you kidney function. Should you symptoms persist for a long time or worsen, call your Primary Care Doctor or come to the ER. If you notice you are having more than 8 episodes of headache or aura related symptoms (visual disturbances, nausea, numbness/tingling), we recommend you see a neurologist. You were found to have lower than normal hemoglobin levels as a result of you decreased kidney function. You need to follow up with your Primary Care Doctor to review your blood work regularly. Be sure to alert a medical professional immediately if you have symptoms of acute or worsening anemia including but not limited to: lightheadedness, dizziness, paleness, palpitations, shortness of breath, abnormal bleeding.

## 2018-09-21 NOTE — PROGRESS NOTE ADULT - SUBJECTIVE AND OBJECTIVE BOX
cc: Patient is a 33y old  Female who presents with a chief complaint of Abnormal labs (18 Sep 2018 17:12)    Followed by:  -->Nephrology: Dr. Smyth  --> Cardiology: Dr. Edmonds  --> Vascular surgery:       INTERVAL/OVERNIGHT EVENTS:  Patient examined at bedside.  As per nurse, No acute events over night.   Patient states felling better compared to admission and is at her baseline, refers mild pain on CVC access. Started with liquids and is tolerating well w/o nausea/vomiting. Patient states she is ready for HD but scared of what could happened. Asked what was the proximal step regarding IV access for future HD sesions and was explained about the differences between the permcath and the AV fistula. patient is ambulating to bathroom. Denies chest pain, SOB, fevers, chills, palpitations, dysuria hematuria.        CARDIAC MONITOR: no events.      Vitals  T(C): 36.8 (20 Sep 2018 04:50), Max: 36.8 (19 Sep 2018 07:46)  T(F): 98.2 (20 Sep 2018 04:50), Max: 98.3 (19 Sep 2018 07:46)  HR: 63 (20 Sep 2018 04:50) (63 - 74)  BP: 122/76 (20 Sep 2018 04:50) (109/68 - 128/69)  RR: 20 (19 Sep 2018 20:06) (18 - 20)  SpO2: 99% (20 Sep 2018 04:50) (96% - 100%)    Physical Exam:   Not in the room. Will follow up after procedure.      LABS    Complete blood count  _________________________________________________________                          9.7    3.2   )-----------( 198      ( 20 Sep 2018 07:30 )             30.3     Mean Cell Volume : 82.6 fl  Mean Cell Hemoglobin : 26.4 pg  Mean Cell Hemoglobin Concentration : 32.0 g/dL                       10.7   3.1   )-----------( 241      ( 18 Sep 2018 13:08 )             32.8       Chemistry  _____________________________________________________________    09-20    140  |  99  |  87.0<H>  ----------------------------<  89  3.7   |  23.0  |  7.24<H>    Ca    9.0      20 Sep 2018 07:30  Phos  6.4     09-20  Mg     2.0     09-20    TPro  8.0  /  Alb  4.0  /  TBili  0.6  /  DBili  x   /  AST  12  /  ALT  24  /  AlkPhos  77  09-18 09-19    139  |  99  |  92.0<H>  ----------------------------<  104  3.8   |  25.0  |  7.79<H>    Ca    9.1      19 Sep 2018 07:06  Phos  6.2     09-19  Mg     2.2     09-19    TPro  8.0  /  Alb  4.0  /  TBili  0.6  /  DBili  x   /  AST  12  /  ALT  24  /  AlkPhos  77  09-18 09-18    141  |  102  |  89.0<H>  ----------------------------<  105  4.0   |  23.0  |  7.39<H>    Ca    9.1      18 Sep 2018 22:48  Phos  6.6     09-18  Mg     2.5     09-18    TPro  8.0  /  Alb  4.0  /  TBili  0.6  /  DBili  x   /  AST  12  /  ALT  24  /  AlkPhos  77  09-18      LIVER FUNCTIONS - ( 18 Sep 2018 13:08 )  Alb: 4.0 g/dL / Pro: 8.0 g/dL / ALK PHOS: 77 U/L / ALT: 24 U/L / AST: 12 U/L / GGT: x               DIET:  -Regular With renal restrictions  -Will be NPO after midnight.    MEDICATIONS  (STANDING):  amLODIPine   Tablet 10 milliGRAM(s) Oral daily  ascorbic acid  Oral Tab/Cap - Peds 250 milliGRAM(s) Oral daily  calcitriol   Capsule 0.5 MICROGram(s) Oral daily  carvedilol 25 milliGRAM(s) Oral every 12 hours  docusate sodium 100 milliGRAM(s) Oral two times a day  ergocalciferol 97492 Unit(s) Oral every week  heparin  Injectable 5000 Unit(s) SubCutaneous every 12 hours  influenza   Vaccine 0.5 milliLiter(s) IntraMuscular once  iron sucrose IVPB 100 milliGRAM(s) IV Intermittent every 24 hours  sodium bicarbonate  Infusion 0.225 mEq/kG/Hr (75 mL/Hr) IV Continuous <Continuous>    MEDICATIONS  (PRN):  acetaminophen   Tablet .. 650 milliGRAM(s) Oral every 6 hours PRN Mild Pain (1 - 3), Moderate Pain (4 - 6)  ondansetron    Tablet 4 milliGRAM(s) Oral every 8 hours PRN Nausea and/or Vomiting cc: Patient is a 33y old  Female who presents with a chief complaint of Abnormal labs (18 Sep 2018 17:12)    Followed by:  -->Nephrology: Dr. Smyth  --> Cardiology: Dr. Edmonds  --> Vascular surgery:       INTERVAL/OVERNIGHT EVENTS:  Patient examined at bedside.  As per nurse, No acute events over night.   Patient states felling better compared to admission and is at her baseline, refers mild pain on CVC access. Started with liquids and is tolerating well w/o nausea/vomiting. Patient tolerated HD only had palpitations that subside after HD finished. Patient is ambulating to bathroom. Patient refers moderate appetite and eating small bites at time to prevent nausea. Denies chest pain, SOB, fevers, chills, palpitations, dysuria hematuria.    ****Had extensive conversation with patient regarding fistula access. Patient stated she was afraid of anesthesia and was not sure about the process. It was explained to her that some HD centers may not accept her w/o the fistula done. One of her  Sisters was present in the room and was recommending the patient to wait for 8 months before accepting fistula. Medical team advise the patient to think about the options presented to her.     CARDIAC MONITOR: no events.      Vitals  Vital Signs Last 24 Hrs  T(C): 36.8 (21 Sep 2018 15:45), Max: 37.5 (20 Sep 2018 18:45)  T(F): 98.2 (21 Sep 2018 15:45), Max: 99.5 (20 Sep 2018 18:45)  HR: 83 (21 Sep 2018 15:45) (72 - 88)  BP: 132/75 (21 Sep 2018 15:45) (110/66 - 132/75)  RR: 18 (21 Sep 2018 15:45) (16 - 20)  SpO2: 98% (21 Sep 2018 15:45) (98% - 100%)      Physical Exam:   GENERAL: NAD, well-groomed, well-developed  HEAD:  Atraumatic, Normocephalic  EYES: EOMI, PERRLA, conjunctiva and sclera clear. Mild palpebral edema.   ENMT: No tonsillar erythema, exudates, or enlargement; Moist mucous membranes, Good dentition, No lesions  NECK: Supple, No JVD, Normal thyroid  NERVOUS SYSTEM:  Alert & Oriented X3, Good concentration; Motor Strength 5/5 B/L upper and lower extremities;   RESP: CVC access covered with clean and dry dressing Clear to auscultation bilaterally; No rales, rhonchi, wheezing, or rubs  CVS: Regular rate and rhythm; No murmurs, rubs, or gallops  GI: Soft, Nontender, Nondistended; Bowel sounds present  EXTREMITIES:  2+ Peripheral Pulses, No clubbing, cyanosis, or edema  LYMPH: No cervical or supraclavicular lymphadenopathy noted  SKIN: No rashes or lesions. Face pallor and conjunctival pallor.       LABS    Complete blood count  _________________________________________________________                          9.2    3.1   )-----------( 164      ( 21 Sep 2018 08:48 )             29.2   CBC Full  -  ( 21 Sep 2018 08:48 )  WBC Count : 3.1 K/uL  Hemoglobin : 9.2 g/dL  Hematocrit : 29.2 %  Platelet Count - Automated : 164 K/uL  Mean Cell Volume : 84.1 fl  Mean Cell Hemoglobin : 26.5 pg  Mean Cell Hemoglobin Concentration : 31.5 g/dL                          9.7    3.2   )-----------( 198      ( 20 Sep 2018 07:30 )             30.3     Mean Cell Volume : 82.6 fl  Mean Cell Hemoglobin : 26.4 pg  Mean Cell Hemoglobin Concentration : 32.0 g/dL                       10.7   3.1   )-----------( 241      ( 18 Sep 2018 13:08 )             32.8       Chemistry  _____________________________________________________________  09-21    139  |  104  |  47.0<H>  ----------------------------<  91  4.3   |  23.0  |  5.81<H>    Ca    8.7      21 Sep 2018 08:48  Phos  4.6     09-21  Mg     2.1     09-21 09-20    140  |  99  |  87.0<H>  ----------------------------<  89  3.7   |  23.0  |  7.24<H>    Ca    9.0      20 Sep 2018 07:30  Phos  6.4     09-20  Mg     2.0     09-20    TPro  8.0  /  Alb  4.0  /  TBili  0.6  /  DBili  x   /  AST  12  /  ALT  24  /  AlkPhos  77  09-18 09-19    139  |  99  |  92.0<H>  ----------------------------<  104  3.8   |  25.0  |  7.79<H>    Ca    9.1      19 Sep 2018 07:06  Phos  6.2     09-19  Mg     2.2     09-19    TPro  8.0  /  Alb  4.0  /  TBili  0.6  /  DBili  x   /  AST  12  /  ALT  24  /  AlkPhos  77  09-18 09-18    141  |  102  |  89.0<H>  ----------------------------<  105  4.0   |  23.0  |  7.39<H>    Ca    9.1      18 Sep 2018 22:48  Phos  6.6     09-18  Mg     2.5     09-18    TPro  8.0  /  Alb  4.0  /  TBili  0.6  /  DBili  x   /  AST  12  /  ALT  24  /  AlkPhos  77  09-18      LIVER FUNCTIONS - ( 18 Sep 2018 13:08 )  Alb: 4.0 g/dL / Pro: 8.0 g/dL / ALK PHOS: 77 U/L / ALT: 24 U/L / AST: 12 U/L / GGT: x               DIET:  -Regular With renal restrictions  -Will be NPO after midnight.    MEDICATIONS  (STANDING):  amLODIPine   Tablet 10 milliGRAM(s) Oral daily  ascorbic acid  Oral Tab/Cap - Peds 250 milliGRAM(s) Oral daily  calcitriol   Capsule 0.5 MICROGram(s) Oral daily  carvedilol 25 milliGRAM(s) Oral every 12 hours  docusate sodium 100 milliGRAM(s) Oral two times a day  epoetin brianda Injectable 6000 Unit(s) IV Push <User Schedule>  ergocalciferol 24799 Unit(s) Oral every week  heparin  Injectable 5000 Unit(s) SubCutaneous every 12 hours  influenza   Vaccine 0.5 milliLiter(s) IntraMuscular once  sevelamer hydrochloride 800 milliGRAM(s) Oral three times a day      MEDICATIONS  (PRN):  acetaminophen   Tablet .. 650 milliGRAM(s) Oral every 6 hours PRN Mild Pain (1 - 3), Moderate Pain (4 - 6)  ondansetron    Tablet 4 milliGRAM(s) Oral every 8 hours PRN Nausea and/or Vomiting

## 2018-09-21 NOTE — PROGRESS NOTE ADULT - SUBJECTIVE AND OBJECTIVE BOX
[facility] DIVISION OF KIDNEY DISEASES AND HYPERTENSION -- FOLLOW UP NOTE  --------------------------------------------------------------------------------  Chief Complaint:  CKD V/ESRD    24 hour events/subjective:  Pt seen and examined today  NAD  S/P CVC  HD today        PAST HISTORY  --------------------------------------------------------------------------------  No significant changes to PMH, PSH, FHx, SHx, unless otherwise noted    ALLERGIES & MEDICATIONS  --------------------------------------------------------------------------------  Allergies    No Known Allergies    Intolerances      Standing Inpatient Medications  amLODIPine   Tablet 10 milliGRAM(s) Oral daily  ascorbic acid  Oral Tab/Cap - Peds 250 milliGRAM(s) Oral daily  calcitriol   Capsule 0.5 MICROGram(s) Oral daily  carvedilol 25 milliGRAM(s) Oral every 12 hours  docusate sodium 100 milliGRAM(s) Oral two times a day  epoetin brianda Injectable 6000 Unit(s) IV Push <User Schedule>  ergocalciferol 12643 Unit(s) Oral every week  heparin  Injectable 5000 Unit(s) SubCutaneous every 12 hours  influenza   Vaccine 0.5 milliLiter(s) IntraMuscular once  sevelamer hydrochloride 800 milliGRAM(s) Oral three times a day    PRN Inpatient Medications  acetaminophen   Tablet .. 650 milliGRAM(s) Oral every 6 hours PRN  ondansetron    Tablet 4 milliGRAM(s) Oral every 8 hours PRN      REVIEW OF SYSTEMS  --------------------------------------------------------------------------------  Gen: No weight changes, fatigue, fevers/chills, weakness  Skin: No rashes  Head/Eyes/Ears/Mouth: No headache; Normal hearing; Normal vision w/o blurriness; No sinus pain/discomfort, sore throat  Respiratory: No dyspnea, cough, wheezing, hemoptysis  CV: No chest pain, PND, orthopnea  GI: No abdominal pain, diarrhea, constipation, nausea, vomiting, melena, hematochezia  : No increased frequency, dysuria, hematuria, nocturia  MSK: No joint pain/swelling; no back pain; no edema  Neuro: No dizziness/lightheadedness, weakness, seizures, numbness, tingling  Heme: No easy bruising or bleeding  Endo: No heat/cold intolerance  Psych: No significant nervousness, anxiety, stress, depression    All other systems were reviewed and are negative, except as noted.    VITALS/PHYSICAL EXAM  --------------------------------------------------------------------------------  T(C): 36.8 (09-21-18 @ 11:00), Max: 37.5 (09-20-18 @ 18:45)  HR: 81 (09-21-18 @ 11:00) (72 - 88)  BP: 110/66 (09-21-18 @ 11:00) (103/65 - 129/77)  RR: 20 (09-21-18 @ 11:00) (16 - 20)  SpO2: 100% (09-20-18 @ 18:45) (100% - 100%)  Wt(kg): --        09-20-18 @ 07:01  -  09-21-18 @ 07:00  --------------------------------------------------------  IN: 0 mL / OUT: 0 mL / NET: 0 mL      Physical Exam:  	Gen: NAD, well-appearing  	HEENT: PERRL, supple neck, clear oropharynx  	Pulm: CTA B/L  	CV: RRR, S1S2; no rub  	Back: No spinal or CVA tenderness; no sacral edema  	Abd: +BS, soft, nontender/nondistended  	: No suprapubic tenderness  	UE: Warm, FROM, no clubbing, intact strength; no edema; no asterixis  	LE: Warm, FROM, no clubbing, intact strength; no edema  	Neuro: No focal deficits, intact gait  	Psych: Normal affect and mood  	Skin: Warm, without rashes  	Vascular access:  CVC ++    LABS/STUDIES  --------------------------------------------------------------------------------              9.2    3.1   >-----------<  164      [09-21-18 @ 08:48]              29.2     139  |  104  |  47.0  ----------------------------<  91      [09-21-18 @ 08:48]  4.3   |  23.0  |  5.81        Ca     8.7     [09-21-18 @ 08:48]      Mg     2.1     [09-21-18 @ 08:48]      Phos  4.6     [09-21-18 @ 08:48]      PT/INR: PT 11.9 , INR 1.08       [09-19-18 @ 19:13]  PTT: 33.7       [09-19-18 @ 19:13]      Creatinine Trend:  SCr 5.81 [09-21 @ 08:48]  SCr 7.24 [09-20 @ 07:30]  SCr 7.79 [09-19 @ 07:06]  SCr 7.39 [09-18 @ 22:48]  SCr 7.49 [09-18 @ 13:08]    Urinalysis - [09-18-18 @ 14:01]      Color Yellow / Appearance Clear / SG 1.010 / pH 6.0      Gluc Negative / Ketone Negative  / Bili Negative / Urobili Negative       Blood Moderate / Protein 500 / Leuk Est Negative / Nitrite Negative      RBC 3-5 / WBC 3-5 / Hyaline  / Gran  / Sq Epi  / Non Sq Epi Occasional / Bacteria       Iron 45, TIBC 209, %sat 19      [08-31-18 @ 06:22]  Ferritin 58      [08-31-18 @ 06:22]  PTH -- (Ca 8.8)      [09-02-18 @ 14:18]   206  PTH -- (Ca 8.8)      [09-01-18 @ 19:52]   181  HbA1c 5.2      [08-31-18 @ 06:22]  TSH 2.15      [08-31-18 @ 06:22]  Lipid: chol 97, TG 66, HDL 39, LDL 45      [08-31-18 @ 06:22]    HBsAb Nonreact      [09-01-18 @ 20:51]  HBsAg Nonreact      [08-31-18 @ 18:18]  HBcAb Nonreact      [08-31-18 @ 18:18]  HCV 0.20, Nonreact      [08-31-18 @ 18:18]    ALBA: titer 1:160, pattern Homogeneous      [09-01-18 @ 20:54]  dsDNA 33      [09-01-18 @ 20:54]  C3 Complement 73      [09-01-18 @ 20:12]  C4 Complement 17      [09-01-18 @ 19:52]  ANCA: cANCA Negative, pANCA Negative, atypical ANCA Indeterminate Method interference due to ALBA fluorescence      [09-01-18 @ 20:54]  anti-GBM <0.2      [09-01-18 @ 23:27]  Syphilis Screen (Treponema Pallidum Ab) Negative      [09-01-18 @ 20:54]  PLA2R: SUMAYA 3, IFA Negative      [09-01-18 @ 23:27]  Free Light Chains: kappa 13.37, lambda 15.70, ratio = 0.85      [09-01 @ 19:52]  Immunofixation Serum:   No Monoclonal Band Identified      [09-01-18 @ 19:52]

## 2018-09-21 NOTE — DISCHARGE NOTE ADULT - CARE PLAN
Principal Discharge DX:	Metabolic acidosis, IAG, reduced excretion of inorganic acids  Secondary Diagnosis:	Chronic kidney insufficiency, stage 5  Secondary Diagnosis:	Hemodialysis status  Secondary Diagnosis:	Hypertension, unspecified type  Secondary Diagnosis:	Migraine without aura, not intractable, without status migrainosus  Secondary Diagnosis:	Hyperprolactinemia Principal Discharge DX:	ESRD on hemodialysis  Goal:	to continue with hemodialysis  Assessment and plan of treatment:	Continue following a renal diet as advised by your Nephrologist. If you have been started on any medications for your kidney function, be sure to take them regularly. Be sure to follow up and have dialysis as scheduled, if it very important that you do not miss sessions! If something comes up and you feel that you cannot make a session, please alert your nephrologist immediately.  Secondary Diagnosis:	Hypertension, unspecified type  Goal:	to keep blood pressure under <140/90 mmHg  Assessment and plan of treatment:	Be sure to follow a low salt diet. If you have been prescribed antihypertensive medications to control your blood pressure, be sure to take them every day as prescribed and do not miss any doses, the medications do not work if they are not taken consistently. Follow up with your Primary Care Doctor and have your Blood Pressure checked.  Secondary Diagnosis:	Migraine without aura, not intractable, without status migrainosus  Assessment and plan of treatment:	Should you have similar symptoms/headache again, you should try taking Tylenol. DO NOT TAKE over the counter Non steroidal anti inflammatory drugs (NSAID) as the y may worsen you kidney function. Should you symptoms persist for a long time or worsen, call your Primary Care Doctor or come to the ER. If you notice you are having more than 8 episodes of headache or aura related symptoms (visual disturbances, nausea, numbness/tingling), we recommend you see a neurologist.  Secondary Diagnosis:	Anemia of chronic kidney failure, stage 5  Assessment and plan of treatment:	You were found to have lower than normal hemoglobin levels as a result of you decreased kidney function. You need to follow up with your Primary Care Doctor to review your blood work regularly. Be sure to alert a medical professional immediately if you have symptoms of acute or worsening anemia including but not limited to: lightheadedness, dizziness, paleness, palpitations, shortness of breath, abnormal bleeding.

## 2018-09-21 NOTE — PROGRESS NOTE ADULT - ASSESSMENT
34 yo female patient with h/o CKD not on HD, Migraines,  HTN presenting with pedal edema up to 1/3 of distal of lower extremities b/l, and  palpebral b/l edema for the last 3 days, concerning of worsening of kidney function.     Patient is clinically stable. Renal function is still in failure and decreasing compared to previous hospitalization. Patient is on S/P CVC access and HD x1, with improvement of cr and BUN values. Patient is  schedule for another session on 09/21        PROBLEMS    Metabolic acidosis secondary to Chronic Kidney disease  ·	Patient had Kidney bx that shows sclerotic changes concerning of FSGS?  ·	Unclear etiology at the time, patient had mildly elevated dsDNA antibody and decreased C3  ·	BUN and Cr trending down after HD x1   ·	S/P CVC access 09/20/2018  ·	S/p HD 09/20/2018, new HD session on 09/21  ·	d/c sodium bicarbonate  Infusion  ·	Hyperkalemia was corrected  ·	Ondansetron 4mg IV PRN nausea/vomiting   ·	calcitriol   Capsule 0.5 MICROGram(s) Oral daily  ·	ergocalciferol 57844 Unit(s) Oral every week  ·	c/w acetaminophen   Tablet .650 milliGRAM(s) Oral every 6 hours PRN Mild Pain (1 - 3), Moderate Pain (4 - 6)  ·	f/u cbc, cmp, Mg, Phosp on AM  ·	Nephro eval noted and appreciated   ·	Vascular surgery eval noted and appreciated     Anemia of chronic disease  ·	Hb w/o change from last admission.  ·	d/c ferrous sulfate Oral Uft490 milliGRAM(s) Oral daily  ·	Start iron sucrose IVPB 100 milliGRAM(s) IV Intermittent every 24 hour  ·	c/w ascorbic acid  Oral Tab/Cap - Peds 250 milliGRAM(s) Oral daily    Hypertension  ·	Stable  ·	c/w carvedilol 25 milliGRAM(s) Oral every 12 hours  ·	c/w amlodipine 10 mg Oral qd    Migraines  ·	Asymptomatic  ·	Pain controlled as above.       Prophylactic measures  ·	heparin  Injectable 5000 Unit(s) SubCutaneous every 12 hours

## 2018-09-21 NOTE — DISCHARGE NOTE ADULT - MEDICATION SUMMARY - MEDICATIONS TO TAKE
I will START or STAY ON the medications listed below when I get home from the hospital:    carvedilol 25 mg oral tablet  -- 1 tab(s) by mouth every 12 hours  -- Indication: For Hypertension, unspecified type    epoetin brianda  -- Indication: For Anemia of chronic kidney failure, stage 5    FeroSul 325 mg (65 mg elemental iron) oral tablet  -- 1 tab(s) by mouth once a day   -- Indication: For Anemia of chronic kidney failure, stage 5    docusate sodium 100 mg oral capsule  -- 1 cap(s) by mouth 2 times a day Stop if you have diarrhea  -- Indication: For constipation     sevelamer hydrochloride 800 mg oral tablet  -- 1 tab(s) by mouth 3 times a day  -- Indication: For Skin care     Vitamin C 250 mg oral tablet  -- 1 tab(s) by mouth once a day , with your Iron pills  -- Indication: For Multivitamin     calcitriol 0.5 mcg oral capsule  -- 1 cap(s) by mouth once a day  -- Indication: For Multivitamin     Vitamin D2 50,000 intl units (1.25 mg) oral capsule  -- 1 cap(s) by mouth once a week   -- Indication: For Multivitamin I will START or STAY ON the medications listed below when I get home from the hospital:    carvedilol 25 mg oral tablet  -- 1 tab(s) by mouth every 12 hours  -- Indication: For Hypertension, unspecified type    epoetin brianda  -- Indication: For Anemia of chronic kidney failure, stage 5    FeroSul 325 mg (65 mg elemental iron) oral tablet  -- 1 tab(s) by mouth once a day   -- Indication: For Anemia of chronic kidney failure, stage 5    docusate sodium 100 mg oral capsule  -- 1 cap(s) by mouth 2 times a day Stop if you have diarrhea  -- Indication: For constipation     sevelamer hydrochloride 800 mg oral tablet  -- 1 tab(s) by mouth 3 times a day  -- Indication: For Anemia of chronic kidney failure, stage 5    Vitamin C 250 mg oral tablet  -- 1 tab(s) by mouth once a day , with your Iron pills  -- Indication: For Multivitamin     calcitriol 0.5 mcg oral capsule  -- 1 cap(s) by mouth once a day  -- Indication: For Multivitamin     Vitamin D2 50,000 intl units (1.25 mg) oral capsule  -- 1 cap(s) by mouth once a week   -- Indication: For Multivitamin

## 2018-09-21 NOTE — DISCHARGE NOTE ADULT - OTHER SIGNIFICANT FINDINGS
10.4   4.1   )-----------( 175      ( 23 Sep 2018 09:41 )             33.0     09-23    138  |  100  |  23.0<H>  ----------------------------<  152<H>  4.5   |  24.0  |  4.59<H>    Ca    9.9      23 Sep 2018 09:41    US Vessel Mapping Hemodialysis (09.19.18 @ 17:48)   IMPRESSION:  NO EVIDENCE OF DEEP VEIN THROMBOSIS.  CEPHALIC AND BASILIC MEASUREMENTS AS DESCRIBED 9.6    6.7   )-----------( x        ( 29 Sep 2018 10:28 )             31.3   09-29    133<L>  |  98  |  64.0<H>  ----------------------------<  146<H>  4.4   |  19.0<L>  |  6.47<H>    Ca    9.0      29 Sep 2018 10:28  Phos  4.5     09-29  Mg     2.4     09-29        US Vessel Mapping Hemodialysis (09.19.18 @ 17:48)   IMPRESSION:  NO EVIDENCE OF DEEP VEIN THROMBOSIS.  CEPHALIC AND BASILIC MEASUREMENTS AS DESCRIBED

## 2018-09-21 NOTE — DISCHARGE NOTE ADULT - CARE PROVIDERS DIRECT ADDRESSES
[email or web address],[email or web address]

## 2018-09-21 NOTE — DISCHARGE NOTE ADULT - PATIENT PORTAL LINK FT
You can access the [facility], offered by [facility], by registering with the following website: [email or web address], [facility]

## 2018-09-21 NOTE — PROGRESS NOTE ADULT - ASSESSMENT
1. CKD V/ESRD - now on HD  2. HTN   3. Anemia of CKD  4. MBD  5. Migraine      S/P CVC access   HD today  Eventual LRD - Transplant  Will continue to follow

## 2018-09-21 NOTE — DISCHARGE NOTE ADULT - SECONDARY DIAGNOSIS.
Chronic kidney insufficiency, stage 5 Hemodialysis status Hypertension, unspecified type Migraine without aura, not intractable, without status migrainosus Hyperprolactinemia Anemia of chronic kidney failure, stage 5

## 2018-09-21 NOTE — DISCHARGE NOTE ADULT - PROVIDER TOKENS
TOKEN:'[id number]',FREE:[LAST:[Fuad],FIRST:[Leonila (MD)],PHONE:[phone]],FAX:[(   )    -],ADDRESS:[[facility] [place]  Address: [address], [place] [address]]] TOKEN:'[id number]',FREE:[LAST:[Fuad],FIRST:[Leonila (MD)],PHONE:[phone]:[(   )    -],ADDRESS:[[facility] [place]  Address: [address], [place] [address]]],TOKEN:'[id number]'

## 2018-09-21 NOTE — DISCHARGE NOTE ADULT - CARE PROVIDER_API CALL
Wali Smyth), Internal Medicine; Nephrology  [address] [place] [address]  Phone: [phone]  Fax: [phone]    Leonila Merida)  [facility] [place]  Address: [address] [facility], [place] [address]  Phone: [phone]  Fax: (   )    - Wali Smyth), Internal Medicine; Nephrology  [address] [place] [address]  Phone: [phone]  Fax: [phone]    Leonila Merida)  [facility] [place]  Address: [address] [place] [address]  Phone: [phone]  Fax: (   )    -    ManvarSingh, Pallavi B (MD), Surgery Vascular  [address] [address]  Phone: [phone]  Fax: [phone]

## 2018-09-21 NOTE — DISCHARGE NOTE ADULT - INSTRUCTIONS
You will need to:  --Eat more high protein foods.  --Eat less high salt, high potassium, and high phosphorous foods.  -->Salt and Sodium: use less salt and eat fewer salty foods, this will help control your blood pressure and reduce weight gains between dialysis sessions. Use herbs, spices and low salt flavor enhancers in place of salt.  -->Meat and Protein: YOU NEED TO EAT MORE PROTEIN as you are on dialysis. Eat fish, meat, poultry, fresh pork, and or eggs at every meal, or about 8-10 ounces of high protein foods everyday. 3 ounces are equal to a medium pork chop, 1/2 chicken breast, a medium fish fillet. 1 ounce equal 1 egg, 1/4 cup of tuna.  -->Grains, cereals and bread: Unless you need to limit your calorie intake for blood sugar control, you may eat as you desire from this group. YOU MUST AVOID "whole grain" and "high fiber" foods to limit your intake of phosphorus.  -->Milk, yogurt and cheese: You need to limit your intake of milk, yogurt, and cheese to 1/2 cup of milk, yogurt or 1 ounce of cheese, as they have high phosphorous content.   -->Fruit and juices: all fruits have some potassium, limiting potassium protects your heart. Limit or avoid: Oranges and orange juice, kiwis, nectarines, prunes, prune juice, raisins and dried fruits, bananas, melons. You may eat: apple, berries, cherries, grapes, watermelon.  -->Vegetable and salads: all vegetables have some potassium.   You may eat: broccoli, cabbage, carrots, cauliflower, celery, cucumber, eggplant, garlic, lettuce, onion, peppers, radishes, zucchini, squash.   You have to limit or avoid: potatoes, sweet potatoes, tomatoes and tomato sauce, asparagus, avocado, beets, beet greens, spinach.

## 2018-09-21 NOTE — DISCHARGE NOTE ADULT - ADDITIONAL INSTRUCTIONS
-->Follow up with Nephrologist Dr. Smyth within 1 week from your discharge. YOU MUST LET THE OFFICE KNOW THIS IS A FOLLOW UP AFTER A HOSPITAL DISCHARGE.  -->Follow up with your primary care doctor within 1 week from your discharge. YOU MUST LET THE OFFICE KNOW THIS IS A FOLLOW UP AFTER A HOSPITAL DISCHARGE.  -->Continue taking your medication as prescribed.    -->Take all your medications as prescribed. -->Follow up with Nephrologist Dr. Smyth within 1 week from your discharge. YOU MUST LET THE OFFICE KNOW THIS IS A FOLLOW UP AFTER A HOSPITAL DISCHARGE.  -->Follow up with your primary care doctor within 1 week from your discharge. YOU MUST LET THE OFFICE KNOW THIS IS A FOLLOW UP AFTER A HOSPITAL DISCHARGE.    -->Follow up with your vascular surgeon 1 week after discharge. Dr. ManvarSingh, Pallavi  -->Continue taking your medication as prescribed.    -->Take all your medications as prescribed.

## 2018-09-21 NOTE — DISCHARGE NOTE ADULT - HOSPITAL COURSE
34 yo female patient with h/o CKD not on HD, Migraines,  HTN presenting with pedal edema up to 1/3 of distal  lower extremities b/l, and  palpebral b/l edema for the last 3 days, concerning of worsening of kidney function.     On admission, renal function was still in failure and decreasing compared to previous hospitalization and patient was found to have metabolic acidosis with normal anion gap  probably as a result of accumulation of acids due to CKD, also, patient presented with hyperphosphatemia, and hyperkalemia controlled after administration of Kayexalate.    Case was followed by vascular surgery and nephrology Patient refused HD in previous hospitalization but accepted permacath placement and HD .... [facility] on 9/4/18 with dx of acute on chronic renal failure had bx performed which resulted as FSGS; pt currently presented with worsening peripheral edema/ fluid overload and was noted to have worsening kidney dysfunction with metabolic acidosis with hyperkalemia. Pt was admitted with fluid overload and uremia requiring urgent HD. Pt had PC placed and was dialyzed, was being dialyzed in the hospital with improvement in all labs and sx. Nephrology continues to follow the patient. Vein mapping was completed and vascular consulted and AVF placed. Pt was dialyzed several times during stay. Pt amlodipine was held by nephrology. Pt carvedilol has been continued. Patient's BP has been stable on admission. [facility] on 9/4/18 with dx of acute on chronic renal failure had bx performed which resulted as FSGS; pt currently presented with worsening peripheral edema/ fluid overload and was noted to have worsening kidney dysfunction with metabolic acidosis with hyperkalemia. Pt was admitted with fluid overload and uremia requiring urgent HD. Pt had PC placed and was dialyzed, was being dialyzed in the hospital with improvement in all labs and sx. Nephrology continues to follow the patient. Vein mapping was completed and vascular consulted and AVF placed. Pt was dialyzed several times during stay. Pt amlodipine was held by nephrology. Pt carvedilol has been continued. Patient's BP has been stable on admission. Tolerating HD well. Patient instructed about HD and fistula care. Patient to follow up with nephrology and vascular as op.     Today Patient seen and examined at bedside with resident team. No acute events overnight. Patient states she is doing well, had a few questions about her follow up appointments and wound care instructions and now understands. She is currently getting dialysis and is comfortable. Patient is eating w/o nausea, vomiting. Patient is ambulating. Patient is voiding. Pt had a BM last night.  ROS: Denies fever, chest pain, SOB, abdominal pain, diarrhea, constipation, calf pain.    Vital Signs Last 24 Hrs  T(C): 37 (29 Sep 2018 09:15), Max: 37 (29 Sep 2018 09:15)  T(F): 98.6 (29 Sep 2018 09:15), Max: 98.6 (29 Sep 2018 09:15)  HR: 72 (29 Sep 2018 09:15) (65 - 77)  BP: 139/75 (29 Sep 2018 09:15) (99/55 - 139/75)  RR: 16 (29 Sep 2018 09:15) (12 - 20)  SpO2: 97% (28 Sep 2018 20:13) (97% - 100%)    Physical Exam:   GENERAL: NAD, well-groomed, well-developed. Thin Cooperative.   HEAD:  Atraumatic, Normocephalic  EYES: EOMI, PERRLA, conjunctiva and sclera clear.    NECK: Supple, No JVD  NEURO: Alert & Oriented X3, Good concentration; Motor Strength 5/5 B/L upper and lower extremities;   RESP: Clear to auscultation bilaterally; No rales, rhonchi, wheezing, or rubs     Rt sided neck CVC access, C/D/I   CVS: Regular rate and rhythm; No murmurs, rubs, or gallops. Capillary refill <2s  GI: Soft, Nontender, Nondistended; Bowel sounds present  EXTREMITIES:  2+ Peripheral Pulses, No clubbing, cyanosis, or edema. AVF placed on Left arm.    Time spent for this discharge 45 minutes

## 2018-09-21 NOTE — PROGRESS NOTE ADULT - SUBJECTIVE AND OBJECTIVE BOX
Patient was seen and evaluated on dialysis.   No c/o CP SOB NV  no F/C  no swelling    T(C): 36.8 (09-21-18 @ 11:00), Max: 37.5 (09-20-18 @ 18:45)  HR: 81 (09-21-18 @ 11:00) (72 - 88)  BP: 110/66 (09-21-18 @ 11:00) (103/65 - 129/77)  Wt(kg): --  PE ;  NAD  lungs - CTA  CV gr 1 murmer,  No gallop or rub  Abd : soft, NT BS +, No masses  Ext- No edema  Neuro : Grossly intact, moving extremities                             9.2    3.1   )-----------( 164      ( 21 Sep 2018 08:48 )             29.2        09-21    139  |  104  |  47.0<H>  ----------------------------<  91  4.3   |  23.0  |  5.81<H>    Ca    8.7      21 Sep 2018 08:48  Phos  4.6     09-21  Mg     2.1     09-21        MEDICATIONS  (STANDING):  acetaminophen   Tablet .. PRN  amLODIPine   Tablet  ascorbic acid  Oral Tab/Cap - Peds  calcitriol   Capsule  carvedilol  docusate sodium  epoetin brianda Injectable  ergocalciferol  heparin  Injectable  influenza   Vaccine  ondansetron    Tablet PRN  sevelamer hydrochloride      Patient stable  Ana HD easily  Continue

## 2018-09-22 LAB
ANION GAP SERPL CALC-SCNC: 12 MMOL/L — SIGNIFICANT CHANGE UP (ref 5–17)
BUN SERPL-MCNC: 25 MG/DL — HIGH (ref 8–20)
CALCIUM SERPL-MCNC: 9.3 MG/DL — SIGNIFICANT CHANGE UP (ref 8.6–10.2)
CHLORIDE SERPL-SCNC: 101 MMOL/L — SIGNIFICANT CHANGE UP (ref 98–107)
CO2 SERPL-SCNC: 27 MMOL/L — SIGNIFICANT CHANGE UP (ref 22–29)
CREAT SERPL-MCNC: 4.35 MG/DL — HIGH (ref 0.5–1.3)
GLUCOSE SERPL-MCNC: 109 MG/DL — SIGNIFICANT CHANGE UP (ref 70–115)
HBV CORE AB SER-ACNC: SIGNIFICANT CHANGE UP
HBV CORE IGM SER-ACNC: SIGNIFICANT CHANGE UP
HBV SURFACE AB SER-ACNC: <3 MIU/ML — LOW
HBV SURFACE AG SER-ACNC: SIGNIFICANT CHANGE UP
HCT VFR BLD CALC: 30.5 % — LOW (ref 37–47)
HCV AB S/CO SERPL IA: 0.1 S/CO — SIGNIFICANT CHANGE UP
HCV AB SERPL-IMP: SIGNIFICANT CHANGE UP
HGB BLD-MCNC: 9.8 G/DL — LOW (ref 12–16)
MCHC RBC-ENTMCNC: 27.2 PG — SIGNIFICANT CHANGE UP (ref 27–31)
MCHC RBC-ENTMCNC: 32.1 G/DL — SIGNIFICANT CHANGE UP (ref 32–36)
MCV RBC AUTO: 84.7 FL — SIGNIFICANT CHANGE UP (ref 81–99)
PLATELET # BLD AUTO: 166 K/UL — SIGNIFICANT CHANGE UP (ref 150–400)
POTASSIUM SERPL-MCNC: 4.1 MMOL/L — SIGNIFICANT CHANGE UP (ref 3.5–5.3)
POTASSIUM SERPL-SCNC: 4.1 MMOL/L — SIGNIFICANT CHANGE UP (ref 3.5–5.3)
RBC # BLD: 3.6 M/UL — LOW (ref 4.4–5.2)
RBC # FLD: 13.9 % — SIGNIFICANT CHANGE UP (ref 11–15.6)
SODIUM SERPL-SCNC: 140 MMOL/L — SIGNIFICANT CHANGE UP (ref 135–145)
WBC # BLD: 3.6 K/UL — LOW (ref 4.8–10.8)
WBC # FLD AUTO: 3.6 K/UL — LOW (ref 4.8–10.8)

## 2018-09-22 PROCEDURE — 99233 SBSQ HOSP IP/OBS HIGH 50: CPT | Mod: GC

## 2018-09-22 PROCEDURE — 90937 HEMODIALYSIS REPEATED EVAL: CPT

## 2018-09-22 PROCEDURE — 99497 ADVNCD CARE PLAN 30 MIN: CPT

## 2018-09-22 RX ADMIN — Medication 100 MILLIGRAM(S): at 05:25

## 2018-09-22 RX ADMIN — ERYTHROPOIETIN 6000 UNIT(S): 10000 INJECTION, SOLUTION INTRAVENOUS; SUBCUTANEOUS at 11:40

## 2018-09-22 RX ADMIN — TUBERCULIN PURIFIED PROTEIN DERIVATIVE 5 UNIT(S): 5 INJECTION, SOLUTION INTRADERMAL at 18:01

## 2018-09-22 RX ADMIN — SEVELAMER CARBONATE 800 MILLIGRAM(S): 2400 POWDER, FOR SUSPENSION ORAL at 21:52

## 2018-09-22 RX ADMIN — CALCITRIOL 0.5 MICROGRAM(S): 0.5 CAPSULE ORAL at 11:13

## 2018-09-22 RX ADMIN — Medication 100 MILLIGRAM(S): at 17:24

## 2018-09-22 RX ADMIN — CARVEDILOL PHOSPHATE 25 MILLIGRAM(S): 80 CAPSULE, EXTENDED RELEASE ORAL at 05:25

## 2018-09-22 RX ADMIN — SEVELAMER CARBONATE 800 MILLIGRAM(S): 2400 POWDER, FOR SUSPENSION ORAL at 05:25

## 2018-09-22 RX ADMIN — Medication 250 MILLIGRAM(S): at 11:13

## 2018-09-22 RX ADMIN — CARVEDILOL PHOSPHATE 25 MILLIGRAM(S): 80 CAPSULE, EXTENDED RELEASE ORAL at 17:25

## 2018-09-22 RX ADMIN — AMLODIPINE BESYLATE 10 MILLIGRAM(S): 2.5 TABLET ORAL at 05:25

## 2018-09-22 RX ADMIN — HEPARIN SODIUM 5000 UNIT(S): 5000 INJECTION INTRAVENOUS; SUBCUTANEOUS at 05:29

## 2018-09-22 RX ADMIN — HEPARIN SODIUM 5000 UNIT(S): 5000 INJECTION INTRAVENOUS; SUBCUTANEOUS at 17:25

## 2018-09-22 NOTE — PROGRESS NOTE ADULT - SUBJECTIVE AND OBJECTIVE BOX
CC: Patient is a 33y old  Female who presents with a chief complaint of edema of lower extremities (22 Sep 2018 10:34)    Patient seen and examined at bedside, No acute overnight events. Patient is s/p HD yesterday, states she felt fatigued, dizzy and also had mild tingling in her b/l feet s/p HD, now resolved. Patient reassured that these symptoms are not uncommon and will be monitored.    Patient ambulating, eating well, voiding and last BM this AM.    ROS: Denies fever, chest pain, SOB, abdominal pain, diarrhea, constipation, calf pain.    VS:   Vital Signs Last 24 Hrs  T(C): 37.1 (22 Sep 2018 11:25), Max: 37.1 (22 Sep 2018 08:12)  T(F): 98.8 (22 Sep 2018 11:25), Max: 98.8 (22 Sep 2018 11:25)  HR: 80 (22 Sep 2018 11:25) (75 - 83)  BP: 124/66 (22 Sep 2018 11:25) (114/75 - 132/75)  RR: 18 (22 Sep 2018 11:25) (18 - 18)  SpO2: 100% (22 Sep 2018 11:25) (98% - 100%)    Physical Exam:   GENERAL: NAD, well-groomed, well-developed  HEAD:  Atraumatic, Normocephalic  EYES: EOMI, PERRLA, conjunctiva and sclera clear. Mild palpebral edema.   ENMT: No tonsillar erythema, exudates, or enlargement; Moist mucous membranes, Good dentition, No lesions  NECK: Supple, No JVD, Normal thyroid  NERVOUS SYSTEM:  Alert & Oriented X3, Good concentration; Motor Strength 5/5 B/L upper and lower extremities;   RESP: CVC access covered with clean and dry dressing, Clear to auscultation bilaterally; No rales, rhonchi, wheezing, or rubs  CVS: Regular rate and rhythm; No murmurs, rubs, or gallops  GI: Soft, Nontender, Nondistended; Bowel sounds present  EXTREMITIES:  2+ Peripheral Pulses, No clubbing, cyanosis, or edema  LYMPH: No cervical or supraclavicular lymphadenopathy noted  SKIN: No rashes or lesions. Face pallor and conjunctival pallor.     Labs:                        9.8    3.6   )-----------( 166      ( 22 Sep 2018 07:32 )             30.5   09-22    140  |  101  |  25.0<H>  ----------------------------<  109  4.1   |  27.0  |  4.35<H>    Ca    9.3      22 Sep 2018 07:32  Phos  4.6     09-21  Mg     2.1     09-21 09-21 @ 07:01  -  09-22 @ 07:00  --------------------------------------------------------  IN: 120 mL / OUT: 500 mL / NET: -380 mL        Radiology:  < from:  Vessel Mapping Hemodialysis (09.19.18 @ 17:48) >  IMPRESSION:    NO EVIDENCE OF DEEP VEIN THROMBOSIS.    CEPHALIC AND BASILIC MEASUREMENTS AS DESCRIBED      Medications:  MEDICATIONS  (STANDING):  amLODIPine   Tablet 10 milliGRAM(s) Oral daily  ascorbic acid  Oral Tab/Cap - Peds 250 milliGRAM(s) Oral daily  calcitriol   Capsule 0.5 MICROGram(s) Oral daily  carvedilol 25 milliGRAM(s) Oral every 12 hours  docusate sodium 100 milliGRAM(s) Oral two times a day  epoetin brianda Injectable 6000 Unit(s) IV Push <User Schedule>  ergocalciferol 40703 Unit(s) Oral every week  heparin  Injectable 5000 Unit(s) SubCutaneous every 12 hours  influenza   Vaccine 0.5 milliLiter(s) IntraMuscular once  sevelamer hydrochloride 800 milliGRAM(s) Oral three times a day    MEDICATIONS  (PRN):  acetaminophen   Tablet .. 650 milliGRAM(s) Oral every 6 hours PRN Mild Pain (1 - 3), Moderate Pain (4 - 6)  ondansetron    Tablet 4 milliGRAM(s) Oral every 8 hours PRN Nausea and/or Vomiting CC: Patient is a 33y old  Female who presents with a chief complaint of edema of lower extremities (22 Sep 2018 10:34)    Patient seen and examined at bedside, No acute overnight events. Patient is s/p HD yesterday, states she felt fatigued, dizzy and also had mild tingling in her b/l feet s/p HD, now resolved. Patient reassured that these symptoms are not uncommon and will be monitored.    Patient ambulating, eating well, voiding and last BM this AM.    ROS: Denies fever, chest pain, SOB, abdominal pain, diarrhea, constipation, calf pain.    VS:   Vital Signs Last 24 Hrs  T(C): 37.1 (22 Sep 2018 11:25), Max: 37.1 (22 Sep 2018 08:12)  T(F): 98.8 (22 Sep 2018 11:25), Max: 98.8 (22 Sep 2018 11:25)  HR: 80 (22 Sep 2018 11:25) (75 - 83)  BP: 124/66 (22 Sep 2018 11:25) (114/75 - 132/75)  RR: 18 (22 Sep 2018 11:25) (18 - 18)  SpO2: 100% (22 Sep 2018 11:25) (98% - 100%)    Physical Exam:   GENERAL: NAD, well-groomed, well-developed  HEAD:  Atraumatic, Normocephalic  EYES: EOMI, PERRLA, conjunctiva and sclera clear.    ENMT: No tonsillar erythema, exudates, or enlargement; Moist mucous membranes, Good dentition, No lesions  NECK: Supple, No JVD  NERVOUS SYSTEM:  Alert & Oriented X3, Good concentration; Motor Strength 5/5 B/L upper and lower extremities;   RESP: Rt sided neck CVC access covered with clean and dry dressing, Clear to auscultation bilaterally; No rales, rhonchi, wheezing, or rubs  CVS: Regular rate and rhythm; No murmurs, rubs, or gallops  GI: Soft, Nontender, Nondistended; Bowel sounds present  EXTREMITIES:  2+ Peripheral Pulses, No clubbing, cyanosis, or edema  SKIN: No rashes or lesions.  Labs:                        9.8    3.6   )-----------( 166      ( 22 Sep 2018 07:32 )             30.5   09-22    140  |  101  |  25.0<H>  ----------------------------<  109  4.1   |  27.0  |  4.35<H>    Ca    9.3      22 Sep 2018 07:32  Phos  4.6     09-21  Mg     2.1     09-21 09-21 @ 07:01  -  09-22 @ 07:00  --------------------------------------------------------  IN: 120 mL / OUT: 500 mL / NET: -380 mL        Radiology:  < from:  Vessel Mapping Hemodialysis (09.19.18 @ 17:48) >  IMPRESSION:    NO EVIDENCE OF DEEP VEIN THROMBOSIS.    CEPHALIC AND BASILIC MEASUREMENTS AS DESCRIBED      Medications:  MEDICATIONS  (STANDING):  amLODIPine   Tablet 10 milliGRAM(s) Oral daily  ascorbic acid  Oral Tab/Cap - Peds 250 milliGRAM(s) Oral daily  calcitriol   Capsule 0.5 MICROGram(s) Oral daily  carvedilol 25 milliGRAM(s) Oral every 12 hours  docusate sodium 100 milliGRAM(s) Oral two times a day  epoetin brianda Injectable 6000 Unit(s) IV Push <User Schedule>  ergocalciferol 14106 Unit(s) Oral every week  heparin  Injectable 5000 Unit(s) SubCutaneous every 12 hours  influenza   Vaccine 0.5 milliLiter(s) IntraMuscular once  sevelamer hydrochloride 800 milliGRAM(s) Oral three times a day    MEDICATIONS  (PRN):  acetaminophen   Tablet .. 650 milliGRAM(s) Oral every 6 hours PRN Mild Pain (1 - 3), Moderate Pain (4 - 6)  ondansetron    Tablet 4 milliGRAM(s) Oral every 8 hours PRN Nausea and/or Vomiting

## 2018-09-22 NOTE — CHART NOTE - NSCHARTNOTEFT_GEN_A_CORE
Attempted to call  Anam La to provide general update on patient's medical condition. No answer, voicemail left with call back information provided. Will continue to follow up.

## 2018-09-22 NOTE — PROGRESS NOTE ADULT - ASSESSMENT
32 yo female patient with h/o CKD not on HD, Migraines,  HTN presenting with pedal edema up to 1/3 of distal of lower extremities b/l, and  palpebral b/l edema for the last 3 days, concerning of worsening of kidney function.     Patient is clinically stable. Renal function is still in failure and decreasing compared to previous hospitalization. Patient is on S/P CVC access and HD x2, with improvement of cr and BUN values. Patient is  schedule for another session on 09/22      Metabolic acidosis secondary to Chronic Kidney disease  ·	Patient had Kidney bx that shows sclerotic changes concerning of FSGS?  ·	Unclear etiology at the time, patient had mildly elevated dsDNA antibody and decreased C3  ·	BUN and Cr trending down after HD x2   ·	S/P CVC access 09/20/2018, patient now opening up to idea of having [facility] fistula placement for HD access as she will likely require it to obtain an outpatient HD seat. Team will f/u with patient  regarding this as per patient.   ·	S/p HD 09/20/2018,  09/21, to go again 9/22  ·	d/c sodium bicarbonate  Infusion  ·	Hyperkalemia was corrected  ·	Ondansetron 4mg IV PRN nausea/vomiting   ·	calcitriol   Capsule 0.5 MICROGram(s) Oral daily  ·	ergocalciferol 35446 Unit(s) Oral every week  ·	c/w acetaminophen   Tablet .650 milliGRAM(s) Oral every 6 hours PRN Mild Pain (1 - 3), Moderate Pain (4 - 6)  ·	f/u cbc, bmp, in AM  ·	Nephro eval noted and appreciated  ·	Vascular surgery eval noted and appreciated     Anemia of chronic disease  ·	Hb w/o change from last admission.  ·	d/c iron therapy  ·	c/w epogen as per Nephro  ·	c/w ascorbic acid  Oral Tab/Cap - Peds 250 milliGRAM(s) Oral daily    Hypertension  ·	Stable  ·	c/w carvedilol 25 milliGRAM(s) Oral every 12 hours  ·	c/w amlodipine 10 mg Oral qd    Migraines  ·	Asymptomatic  ·	Pain controlled as above.       Prophylactic measures  ·	heparin  Injectable 5000 Unit(s) SubCutaneous every 12 hours 34 yo female patient with h/o CKD not on HD, Migraines,  HTN presenting with pedal edema up to 1/3 of distal of lower extremities b/l, and  palpebral b/l edema for the last 3 days, concerning of worsening of kidney function.     Patient is clinically stable. Renal function is still in failure and decreasing compared to previous hospitalization. Patient is on S/P CVC access and HD x2, with improvement of cr and BUN values. 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 Team will f/u with patient  regarding this as per patient  ·	S/p HD 09/20/2018,  09/21, to go again 9/22  ·	cont symptomatic meds for SE related to HD including Tylenol + Ondansetron 4mg IV PRN nausea/vomiting related to HD  ·	renal meds per nephro including calcitriol   Capsule 0.5 MICROGram(s) Oral daily, ergocalciferol 86970 Unit(s) Oral every week  ·	Nephro eval noted and appreciated  ·	Vascular surgery eval noted and appreciated   ·	pending DC after HD seat in community is established    Anemia of chronic disease  ·	stable  ·	baseline Hgb 9-10, w/o change from last admission.  ·	hx of transfusion of prbc during last admission  ·	d/c iron therapy, c/w epogen as per Nephro  ·	c/w ascorbic acid  Oral Tab/Cap - Peds 250 milliGRAM(s) Oral daily    Hypertension  ·	Stable  ·	goal BP <140/90, controlled  ·	c/w carvedilol 25 milliGRAM(s) Oral every 12 hours  ·	c/w amlodipine 10 mg Oral qd    Migraines  ·	stable, Asymptomatic  ·	Pain controlled as above.       Prophylactic measures  ·	heparin  Injectable 5000 Unit(s) SubCutaneous every 12 hours

## 2018-09-22 NOTE — PROGRESS NOTE ADULT - SUBJECTIVE AND OBJECTIVE BOX
[facility] DIVISION OF KIDNEY DISEASES AND HYPERTENSION -- FOLLOW UP NOTE  --------------------------------------------------------------------------------  Chief Complaint:  CKD - 5 /ESRD - HD,    24 hour events/subjective:  Pt seen and examined today  NAD  S/P CVC  HD today    PAST HISTORY  --------------------------------------------------------------------------------  No significant changes to PMH, PSH, FHx, SHx, unless otherwise noted    ALLERGIES & MEDICATIONS  --------------------------------------------------------------------------------  Allergies    No Known Allergies    Standing Inpatient Medications  amLODIPine   Tablet 10 milliGRAM(s) Oral daily  ascorbic acid  Oral Tab/Cap - Peds 250 milliGRAM(s) Oral daily  calcitriol   Capsule 0.5 MICROGram(s) Oral daily  carvedilol 25 milliGRAM(s) Oral every 12 hours  docusate sodium 100 milliGRAM(s) Oral two times a day  epoetin brianda Injectable 6000 Unit(s) IV Push <User Schedule>  ergocalciferol 77643 Unit(s) Oral every week  heparin  Injectable 5000 Unit(s) SubCutaneous every 12 hours  influenza   Vaccine 0.5 milliLiter(s) IntraMuscular once  sevelamer hydrochloride 800 milliGRAM(s) Oral three times a day    PRN Inpatient Medications  acetaminophen   Tablet .. 650 milliGRAM(s) Oral every 6 hours PRN  ondansetron    Tablet 4 milliGRAM(s) Oral every 8 hours PRN    REVIEW OF SYSTEMS  --------------------------------------------------------------------------------  Gen: No weight changes, fatigue, fevers/chills, weakness  Skin: No rashes  Head/Eyes/Ears/Mouth: No headache; Normal hearing; Normal vision w/o blurriness; No sinus pain/discomfort, sore throat  Respiratory: No dyspnea, cough, wheezing, hemoptysis  CV: No chest pain, PND, orthopnea  GI: No abdominal pain, diarrhea, constipation, nausea, vomiting, melena, hematochezia  : No increased frequency, dysuria, hematuria, nocturia  MSK: No joint pain/swelling; no back pain; no edema  Neuro: No dizziness/lightheadedness, weakness, seizures, numbness, tingling  Heme: No easy bruising or bleeding  Endo: No heat/cold intolerance  Psych: No significant nervousness, anxiety, stress, depression    All other systems were reviewed and are negative, except as noted.    VITALS/PHYSICAL EXAM  --------------------------------------------------------------------------------  T(C): 36.8 (09-21-18 @ 11:00), Max: 37.5 (09-20-18 @ 18:45)  HR: 81 (09-21-18 @ 11:00) (72 - 88)  BP: 110/66 (09-21-18 @ 11:00) (103/65 - 129/77)  RR: 20 (09-21-18 @ 11:00) (16 - 20)  SpO2: 100% (09-20-18 @ 18:45) (100% - 100%)  Wt(kg): --    09-20-18 @ 07:01  -  09-21-18 @ 07:00  --------------------------------------------------------  IN: 0 mL / OUT: 0 mL / NET: 0 mL    Physical Exam:  	Gen: NAD, well-appearing  	HEENT: PERRL, supple neck, clear oropharynx  	Pulm: CTA B/L  	CV: RRR, S1S2; no rub  	Back: No spinal or CVA tenderness; no sacral edema  	Abd: +BS, soft, nontender/nondistended  	: No suprapubic tenderness  	UE: Warm, FROM, no clubbing, intact strength; no edema; no asterixis  	LE: Warm, FROM, no clubbing, intact strength; no edema  	Neuro: No focal deficits, intact gait  	Psych: Normal affect and mood  	Skin: Warm, without rashes  	Vascular access:  CVC ++    LABS/STUDIES  --------------------------------------------------------------------------------              9.2    3.1   >-----------<  164      [09-21-18 @ 08:48]              29.2     139  |  104  |  47.0  ----------------------------<  91      [09-21-18 @ 08:48]  4.3   |  23.0  |  5.81        Ca     8.7     [09-21-18 @ 08:48]      Mg     2.1     [09-21-18 @ 08:48]      Phos  4.6     [09-21-18 @ 08:48]      PT/INR: PT 11.9 , INR 1.08       [09-19-18 @ 19:13]  PTT: 33.7       [09-19-18 @ 19:13]      Creatinine Trend:  SCr 5.81 [09-21 @ 08:48]  SCr 7.24 [09-20 @ 07:30]  SCr 7.79 [09-19 @ 07:06]  SCr 7.39 [09-18 @ 22:48]  SCr 7.49 [09-18 @ 13:08]    Urinalysis - [09-18-18 @ 14:01]      Color Yellow / Appearance Clear / SG 1.010 / pH 6.0      Gluc Negative / Ketone Negative  / Bili Negative / Urobili Negative       Blood Moderate / Protein 500 / Leuk Est Negative / Nitrite Negative      RBC 3-5 / WBC 3-5 / Hyaline  / Gran  / Sq Epi  / Non Sq Epi Occasional / Bacteria       Iron 45, TIBC 209, %sat 19      [08-31-18 @ 06:22]  Ferritin 58      [08-31-18 @ 06:22]  PTH -- (Ca 8.8)      [09-02-18 @ 14:18]   206  PTH -- (Ca 8.8)      [09-01-18 @ 19:52]   181  HbA1c 5.2      [08-31-18 @ 06:22]  TSH 2.15      [08-31-18 @ 06:22]  Lipid: chol 97, TG 66, HDL 39, LDL 45      [08-31-18 @ 06:22]    HBsAb Nonreact      [09-01-18 @ 20:51]  HBsAg Nonreact      [08-31-18 @ 18:18]  HBcAb Nonreact      [08-31-18 @ 18:18]  HCV 0.20, Nonreact      [08-31-18 @ 18:18]    ALBA: titer 1:160, pattern Homogeneous      [09-01-18 @ 20:54]  dsDNA 33      [09-01-18 @ 20:54]  C3 Complement 73      [09-01-18 @ 20:12]  C4 Complement 17      [09-01-18 @ 19:52]  ANCA: cANCA Negative, pANCA Negative, atypical ANCA Indeterminate Method interference due to ALBA fluorescence      [09-01-18 @ 20:54]  anti-GBM <0.2      [09-01-18 @ 23:27]  Syphilis Screen (Treponema Pallidum Ab) Negative      [09-01-18 @ 20:54]  PLA2R: SUMAYA 3, IFA Negative      [09-01-18 @ 23:27]  Free Light Chains: kappa 13.37, lambda 15.70, ratio = 0.85      [09-01 @ 19:52]  Immunofixation Serum:   No Monoclonal Band Identified      [09-01-18 @ 19:52]      1. CKD /ESRD - now on HD  2. HTN   3. Anemia of CKD  4. MBD  5. Migraine      S/P CVC access , Needs AVF,  HD today  Eventual LRD - Transplant  Will continue to follow

## 2018-09-22 NOTE — PROGRESS NOTE ADULT - SUBJECTIVE AND OBJECTIVE BOX
Vital Signs Last 24 Hrs  T(C): 37.1 (22 Sep 2018 08:12), Max: 37.1 (22 Sep 2018 08:12)  T(F): 98.7 (22 Sep 2018 08:12), Max: 98.7 (22 Sep 2018 08:12)  HR: 79 (22 Sep 2018 08:12) (75 - 83)  BP: 117/75 (22 Sep 2018 08:12) (110/66 - 132/75)  BP(mean): --  RR: 18 (22 Sep 2018 08:12) (18 - 20)  SpO2: 100% (21 Sep 2018 21:15) (98% - 100%)    140    |  101    |  25.0<H>  ----------------------------<  109    Ca:9.3   (22 Sep 2018 07:32)  4.1     |  27.0   |  4.35<H>      eGFR if Non : 12 <L>  eGFR if : 14 <L>                          9.8<L>  3.6<L> )-----------( 166      ( 22 Sep 2018 07:32 )                30.5<L>    Phos: 4.6 mg/dL M.1 mg/dL PTH:-- Uric acid:-- Serum Osm:--  Ferritin:-- Iron:-- TIBC:-- Tsat:--  B12:-- TSH:-- ( @ 08:48)    Urinalysis Basic - ( 18 Sep 2018 14:01 )  Color: Yellow / Appearance: Clear / S.010 / pH: x  Gluc: x / Ketone: Negative  / Bili: Negative / Urobili: Negative mg/dL   Blood: x / Protein: 500 mg/dL<!> / Nitrite: Negative   Leuk Esterase: Negative / RBC: 3-5 /HPF<!> / WBC 3-5   Sq Epi: x / Non Sq Epi: Occasional / Bacteria: x      UProt:-- UCr:19 mg/dL P/C Ratio:7.5 Ratio<H> 24 hour Prot:-- UVol:-- CrCl:--  Eliana:-- UOsm:-- UVol:-- UCl:-- [place]:-- ( @ 02:26)    Patient was seen and evaluated on dialysis.   Patient is tolerating the procedure well.   T(C): 37.1 (18 @ 08:12), Max: 37.1 (18 @ 08:12)  HR: 79 (18 @ 08:12) (75 - 83)  BP: 117/75 (18 @ 08:12) (110/66 - 132/75)  Continue dialysis: Monday,  Dialyzer: Revaclear 300    QB: 450 ml.,   QD: 500ml.,  Goal UF 0.5 L  over  3  Hours

## 2018-09-23 LAB
ANION GAP SERPL CALC-SCNC: 14 MMOL/L — SIGNIFICANT CHANGE UP (ref 5–17)
BUN SERPL-MCNC: 23 MG/DL — HIGH (ref 8–20)
CALCIUM SERPL-MCNC: 9.9 MG/DL — SIGNIFICANT CHANGE UP (ref 8.6–10.2)
CHLORIDE SERPL-SCNC: 100 MMOL/L — SIGNIFICANT CHANGE UP (ref 98–107)
CO2 SERPL-SCNC: 24 MMOL/L — SIGNIFICANT CHANGE UP (ref 22–29)
CREAT SERPL-MCNC: 4.59 MG/DL — HIGH (ref 0.5–1.3)
GLUCOSE SERPL-MCNC: 152 MG/DL — HIGH (ref 70–115)
HCT VFR BLD CALC: 33 % — LOW (ref 37–47)
HGB BLD-MCNC: 10.4 G/DL — LOW (ref 12–16)
MCHC RBC-ENTMCNC: 26.9 PG — LOW (ref 27–31)
MCHC RBC-ENTMCNC: 31.5 G/DL — LOW (ref 32–36)
MCV RBC AUTO: 85.5 FL — SIGNIFICANT CHANGE UP (ref 81–99)
PLATELET # BLD AUTO: 175 K/UL — SIGNIFICANT CHANGE UP (ref 150–400)
POTASSIUM SERPL-MCNC: 4.5 MMOL/L — SIGNIFICANT CHANGE UP (ref 3.5–5.3)
POTASSIUM SERPL-SCNC: 4.5 MMOL/L — SIGNIFICANT CHANGE UP (ref 3.5–5.3)
RBC # BLD: 3.86 M/UL — LOW (ref 4.4–5.2)
RBC # FLD: 14 % — SIGNIFICANT CHANGE UP (ref 11–15.6)
SODIUM SERPL-SCNC: 138 MMOL/L — SIGNIFICANT CHANGE UP (ref 135–145)
WBC # BLD: 4.1 K/UL — LOW (ref 4.8–10.8)
WBC # FLD AUTO: 4.1 K/UL — LOW (ref 4.8–10.8)

## 2018-09-23 PROCEDURE — 99233 SBSQ HOSP IP/OBS HIGH 50: CPT

## 2018-09-23 RX ORDER — DOCUSATE SODIUM 100 MG
100 CAPSULE ORAL
Qty: 0 | Refills: 0 | Status: DISCONTINUED | OUTPATIENT
Start: 2018-09-23 | End: 2018-09-28

## 2018-09-23 RX ADMIN — Medication 100 MILLIGRAM(S): at 17:03

## 2018-09-23 RX ADMIN — HEPARIN SODIUM 5000 UNIT(S): 5000 INJECTION INTRAVENOUS; SUBCUTANEOUS at 17:03

## 2018-09-23 RX ADMIN — Medication 100 MILLIGRAM(S): at 06:12

## 2018-09-23 RX ADMIN — SEVELAMER CARBONATE 800 MILLIGRAM(S): 2400 POWDER, FOR SUSPENSION ORAL at 06:12

## 2018-09-23 RX ADMIN — CALCITRIOL 0.5 MICROGRAM(S): 0.5 CAPSULE ORAL at 13:38

## 2018-09-23 RX ADMIN — CARVEDILOL PHOSPHATE 25 MILLIGRAM(S): 80 CAPSULE, EXTENDED RELEASE ORAL at 06:12

## 2018-09-23 RX ADMIN — AMLODIPINE BESYLATE 10 MILLIGRAM(S): 2.5 TABLET ORAL at 06:12

## 2018-09-23 RX ADMIN — Medication 250 MILLIGRAM(S): at 13:38

## 2018-09-23 RX ADMIN — CARVEDILOL PHOSPHATE 25 MILLIGRAM(S): 80 CAPSULE, EXTENDED RELEASE ORAL at 17:04

## 2018-09-23 RX ADMIN — SEVELAMER CARBONATE 800 MILLIGRAM(S): 2400 POWDER, FOR SUSPENSION ORAL at 17:03

## 2018-09-23 RX ADMIN — HEPARIN SODIUM 5000 UNIT(S): 5000 INJECTION INTRAVENOUS; SUBCUTANEOUS at 06:13

## 2018-09-23 NOTE — PROGRESS NOTE ADULT - SUBJECTIVE AND OBJECTIVE BOX
****NOTE IN PROGRESS  CC: Patient is a 33y old  Female who presents with a chief complaint of edema of lower extremities (22 Sep 2018 10:34)    Patient seen and examined at bedside, No acute overnight events. Patient is s/p HD yesterday, states she felt fatigued, dizzy and also had mild tingling in her b/l feet s/p HD, now resolved. Patient reassured that these symptoms are not uncommon and will be monitored.    Patient ambulating, eating well, voiding and last BM this AM.    ROS: Denies fever, chest pain, SOB, abdominal pain, diarrhea, constipation, calf pain.      Vital Signs Last 24 Hrs  T(C): 36.9 (23 Sep 2018 04:20), Max: 37.7 (22 Sep 2018 21:48)  T(F): 98.5 (23 Sep 2018 04:20), Max: 99.8 (22 Sep 2018 21:48)  HR: 78 (23 Sep 2018 04:20) (51 - 88)  BP: 125/81 (23 Sep 2018 04:20) (117/75 - 137/80)  RR: 18 (23 Sep 2018 04:20) (18 - 20)  SpO2: 99% (22 Sep 2018 21:48) (99% - 100%)    Physical Exam:   GENERAL: NAD, well-groomed, well-developed  HEAD:  Atraumatic, Normocephalic  EYES: EOMI, PERRLA, conjunctiva and sclera clear.    ENMT: No tonsillar erythema, exudates, or enlargement; Moist mucous membranes, Good dentition, No lesions  NECK: Supple, No JVD  NERVOUS SYSTEM:  Alert & Oriented X3, Good concentration; Motor Strength 5/5 B/L upper and lower extremities;   RESP: Rt sided neck CVC access covered with clean and dry dressing, Clear to auscultation bilaterally; No rales, rhonchi, wheezing, or rubs  CVS: Regular rate and rhythm; No murmurs, rubs, or gallops  GI: Soft, Nontender, Nondistended; Bowel sounds present  EXTREMITIES:  2+ Peripheral Pulses, No clubbing, cyanosis, or edema  SKIN: No rashes or lesions.  Labs:                        9.8    3.6   )-----------( 166      ( 22 Sep 2018 07:32 )             30.5   09-22    140  |  101  |  25.0<H>  ----------------------------<  109  4.1   |  27.0  |  4.35<H>    Ca    9.3      22 Sep 2018 07:32  Phos  4.6     09-21  Mg     2.1     09-21 09-21 @ 07:01  -  09-22 @ 07:00  --------------------------------------------------------  IN: 120 mL / OUT: 500 mL / NET: -380 mL        Radiology:  < from:  Vessel Mapping Hemodialysis (09.19.18 @ 17:48) >  IMPRESSION:    NO EVIDENCE OF DEEP VEIN THROMBOSIS.    CEPHALIC AND BASILIC MEASUREMENTS AS DESCRIBED      Medications:  MEDICATIONS  (STANDING):  amLODIPine   Tablet 10 milliGRAM(s) Oral daily  ascorbic acid  Oral Tab/Cap - Peds 250 milliGRAM(s) Oral daily  calcitriol   Capsule 0.5 MICROGram(s) Oral daily  carvedilol 25 milliGRAM(s) Oral every 12 hours  docusate sodium 100 milliGRAM(s) Oral two times a day  epoetin brianda Injectable 6000 Unit(s) IV Push <User Schedule>  ergocalciferol 66507 Unit(s) Oral every week  heparin  Injectable 5000 Unit(s) SubCutaneous every 12 hours  influenza   Vaccine 0.5 milliLiter(s) IntraMuscular once  sevelamer hydrochloride 800 milliGRAM(s) Oral three times a day    MEDICATIONS  (PRN):  acetaminophen   Tablet .. 650 milliGRAM(s) Oral every 6 hours PRN Mild Pain (1 - 3), Moderate Pain (4 - 6)  ondansetron    Tablet 4 milliGRAM(s) Oral every 8 hours PRN Nausea and/or Vomiting CC: Patient is a 33y old  Female who presents with a chief complaint of edema of lower extremities (22 Sep 2018 10:34)      Interval events  Patient examined at bedside.  As per nurse, no acute events over night.   As per patient, she refers felling better with mild pain on CVC access 2/10 intensity states she is sleeping better. Patient states the pedal edema has resolved since admission and that she is feeling close to her baseline. Patient is eating w/o nausea, vomiting.  Actively voiding and last Bm yesterday w/o abdominal pain/  diarrhea. Patient states she is confuse about what she can and cannot eat, she does not want to make mistakes and her levels to go up again. Also, it was explained to her that her kidney values were decreasing thanks to dialysis.      Patient is ambulating. ROS: Denies fever, chest pain, SOB, abdominal pain, diarrhea, constipation, calf pain.      Vital Signs Last 24 Hrs  T(C): 36.9 (23 Sep 2018 04:20), Max: 37.7 (22 Sep 2018 21:48)  T(F): 98.5 (23 Sep 2018 04:20), Max: 99.8 (22 Sep 2018 21:48)  HR: 78 (23 Sep 2018 04:20) (51 - 88)  BP: 125/81 (23 Sep 2018 04:20) (125/81 - 128/80)  RR: 18 (23 Sep 2018 04:20) (18 - 20)  SpO2: 99% (22 Sep 2018 21:48) (99% - 99%)    Physical Exam:   GENERAL: NAD, well-groomed, well-developed. Cooperative.   HEAD:  Atraumatic, Normocephalic  EYES: EOMI, PERRLA, conjunctiva and sclera clear.    ENMT: No tonsillar erythema, exudates, or enlargement; Moist mucous membranes, Good dentition, No lesions  NECK: Supple, No JVD  NERVOUS SYSTEM:  Alert & Oriented X3, Good concentration; Motor Strength 5/5 B/L upper and lower extremities;   RESP: Rt sided neck CVC access covered with clean and dry dressing, Clear to auscultation bilaterally; No rales, rhonchi, wheezing, or rubs  CVS: Regular rate and rhythm; No murmurs, rubs, or gallops. Capillary refill <2s  GI: Soft, Nontender, Nondistended; Bowel sounds present  EXTREMITIES:  2+ Peripheral Pulses, No clubbing, cyanosis, or edema.  SKIN: No rashes or lesions.        LABS    Complete blood count  ______________________________________________________________                        10.4   4.1   )-----------( 175      ( 23 Sep 2018 09:41 )             33.0                    9.8    3.6   )-----------( 166      ( 22 Sep 2018 07:32 )             30.5       Chemistry   _______________________________________________________________    09-23    138  |  100  |  23.0<H>  ----------------------------<  152<H>  4.5   |  24.0  |  4.59<H>    Ca    9.9      23 Sep 2018 09:41      09-22    140  |  101  |  25.0<H>  ----------------------------<  109  4.1   |  27.0  |  4.35<H>    Ca    9.3      22 Sep 2018 07:32  Phos  4.6     09-21  Mg     2.1     09-21 09-21 @ 07:01 - 09-22 @ 07:00  --------------------------------------------------------  IN: 120 mL / OUT: 500 mL / NET: -380 mL      Radiology  _______________________________________________________________    US Vessel Mapping Hemodialysis (09.19.18 @ 17:48)   IMPRESSION:  NO EVIDENCE OF DEEP VEIN THROMBOSIS.  CEPHALIC AND BASILIC MEASUREMENTS AS DESCRIBED      MEDICATIONS  (STANDING):  amLODIPine   Tablet 10 milliGRAM(s) Oral daily  ascorbic acid  Oral Tab/Cap - Peds 250 milliGRAM(s) Oral daily  calcitriol   Capsule 0.5 MICROGram(s) Oral daily  carvedilol 25 milliGRAM(s) Oral every 12 hours  docusate sodium 100 milliGRAM(s) Oral two times a day  epoetin brianda Injectable 6000 Unit(s) IV Push <User Schedule>  ergocalciferol 49265 Unit(s) Oral every week  heparin  Injectable 5000 Unit(s) SubCutaneous every 12 hours  influenza   Vaccine 0.5 milliLiter(s) IntraMuscular once  sevelamer hydrochloride 800 milliGRAM(s) Oral three times a day    MEDICATIONS  (PRN):  acetaminophen   Tablet .. 650 milliGRAM(s) Oral every 6 hours PRN Mild Pain (1 - 3), Moderate Pain (4 - 6)  ondansetron    Tablet 4 milliGRAM(s) Oral every 8 hours PRN Nausea and/or Vomiting

## 2018-09-23 NOTE — PROGRESS NOTE ADULT - ASSESSMENT
32 yo female patient with h/o CKD , Migraine,  HTN presenting with pedal edema up to 1/3 of distal of lower extremities for the last 3 days, concerning of worsening of kidney function.     Patient is on S/P CVC , On HD since last few days,      Recent Onset ESRD  improving  Patient had Kidney bx during last recent admission that shows sclerotic changes , Features collapsing FSGS in the setting of mildly elevated dsDNA antibody and decreased C3  S/P CVC access 09/20/2018, BUN and Cr trending down after HD x  3,       Anemia of chronic disease  stable  baseline Hgb 9-10, w/o change from last admission.  hx of transfusion of prbc during last admission  d/c iron therapy, c/w epogen ,  c/w ascorbic acid  Oral Tab/Cap - Peds 250 milliGRAM(s) Oral daily    Hypertension  Stable  goal BP <140/90, controlled  c/w carvedilol 25 milliGRAM(s) Oral every 12 hours  c/w amlodipine 10 mg Oral qd    Migraines  stable, Asymptomatic  Pain controlled as above.

## 2018-09-23 NOTE — PROGRESS NOTE ADULT - SUBJECTIVE AND OBJECTIVE BOX
Vital Signs Last 24 Hrs  T(C): 36.9 (23 Sep 2018 04:20), Max: 37.7 (22 Sep 2018 21:48)  T(F): 98.5 (23 Sep 2018 04:20), Max: 99.8 (22 Sep 2018 21:48)  HR: 78 (23 Sep 2018 04:20) (51 - 88)  BP: 125/81 (23 Sep 2018 04:20) (125/81 - 137/80)  BP(mean): --  RR: 18 (23 Sep 2018 04:20) (18 - 20)  SpO2: 99% (22 Sep 2018 21:48) (99% - 100%)    138    |  100    |  23.0<H>  ----------------------------<  152<H>  Ca:9.9   (23 Sep 2018 09:41)  4.5     |  24.0   |  4.59<H>      eGFR if Non : 12 <L>  eGFR if : 13 <L>                         10.4<L>  4.1<L> )-----------( 175      ( 23 Sep 2018 09:41 )             33.0<L>    Phos: 4.6 mg/dL M.1 mg/dL PTH:-- Uric acid:-- Serum Osm:--  Ferritin:-- Iron:-- TIBC:-- Tsat:--  B12:-- TSH:-- ( @ 08:48)    Urinalysis Basic - ( 18 Sep 2018 14:01 )  Color: Yellow / Appearance: Clear / S.010 / pH: x  Gluc: x / Ketone: Negative  / Bili: Negative / Urobili: Negative mg/dL   Blood: x / Protein: 500 mg/dL<!> / Nitrite: Negative   Leuk Esterase: Negative / RBC: 3-5 /HPF<!> / WBC 3-5   Sq Epi: x / Non Sq Epi: Occasional / Bacteria: x    Chief Complaint:  ESRD - HD,    24 hour events/subjective:  Pt seen and examined today  NAD  S/P CVC  HD in AM,    PAST HISTORY  --------------------------------------------------------------------------------  No significant changes to PMH, PSH, FHx, SHx, unless otherwise noted    ALLERGIES & MEDICATIONS  --------------------------------------------------------------------------------  Allergies    No Known Allergies    Standing Inpatient Medications  amLODIPine   Tablet 10 milliGRAM(s) Oral daily  ascorbic acid  Oral Tab/Cap - Peds 250 milliGRAM(s) Oral daily  calcitriol   Capsule 0.5 MICROGram(s) Oral daily  carvedilol 25 milliGRAM(s) Oral every 12 hours  docusate sodium 100 milliGRAM(s) Oral two times a day  epoetin brianda Injectable 6000 Unit(s) IV Push <User Schedule>  ergocalciferol 49913 Unit(s) Oral every week  heparin  Injectable 5000 Unit(s) SubCutaneous every 12 hours  influenza   Vaccine 0.5 milliLiter(s) IntraMuscular once  sevelamer hydrochloride 800 milliGRAM(s) Oral three times a day    PRN Inpatient Medications  acetaminophen   Tablet .. 650 milliGRAM(s) Oral every 6 hours PRN  ondansetron    Tablet 4 milliGRAM(s) Oral every 8 hours PRN    REVIEW OF SYSTEMS  --------------------------------------------------------------------------------  Gen: +  weight changes, fatigue, No fevers/chills, weakness  Skin: No rashes  Head/Eyes/Ears/Mouth: No headache; Normal hearing; Normal vision w/o blurriness; No sinus pain/discomfort, sore throat  Respiratory: No dyspnea, cough, wheezing, hemoptysis  CV: No chest pain, PND, orthopnea  GI: No abdominal pain, diarrhea, + constipation, No nausea, vomiting, melena, hematochezia  : No increased frequency, dysuria, hematuria, nocturia  MSK: No joint pain/swelling; no back pain; no edema  Neuro: No dizziness/lightheadedness, weakness, seizures, numbness, tingling  Heme: No easy bruising or bleeding  Endo: No heat/cold intolerance  Psych: No significant nervousness, anxiety, stress, depression    All other systems were reviewed and are negative, except as noted.    18 @ 07:01  -  18 @ 07:00  --------------------------------------------------------  IN: 0 mL / OUT: 0 mL / NET: 0 mL    Physical Exam:  	Gen: NAD, well-appearing, Pale,  	HEENT: PERRL, supple neck, clear oropharynx  	Pulm: CTA B/L  	CV: RRR, S1S2; no rub  	Back: No spinal or CVA tenderness; no sacral edema  	Abd: +BS, soft, nontender/nondistended  	: No suprapubic tenderness  	UE: Warm, FROM, no clubbing, intact strength; no edema; no asterixis  	LE: Warm, FROM, no clubbing, intact strength; no edema  	Neuro: No focal deficits, intact gait  	Psych: Normal affect and mood  	Skin: Warm, without rashes  	Vascular access:  CVC ++    LABS/STUDIES  --------------------------------------------------------------------------------               9.2    3.1   >-----------<  164      [18 @ 08:48]              29.2     139  |  104  |  47.0  ----------------------------<  91      [18 @ 08:48]  4.3   |  23.0  |  5.81        Ca     8.7     [18 08:48]      Mg     2.1     [18 08:48]      Phos  4.6     [18 08:48]      PT/INR: PT 11.9 , INR 1.08       [18 @ 19:13]  PTT: 33.7       [18 19:13]      Creatinine Trend:  SCr 5.81 [ 08:48]  SCr 7.24 [ 07:30]  SCr 7.79 [ 07:06]  SCr 7.39 [ 22:48]  SCr 7.49 [ 13:08]    Urinalysis - [18 @ 14:01]      Color Yellow / Appearance Clear / SG 1.010 / pH 6.0      Gluc Negative / Ketone Negative  / Bili Negative / Urobili Negative       Blood Moderate / Protein 500 / Leuk Est Negative / Nitrite Negative      RBC 3-5 / WBC 3-5 / Hyaline  / Gran  / Sq Epi  / Non Sq Epi Occasional / Bacteria       Iron 45, TIBC 209, %sat 19      [18 @ 06:22]  Ferritin 58      [18 @ 06:22]  PTH -- (Ca 8.8)      [18 @ 14:18]   206  PTH -- (Ca 8.8)      [18 @ 19:52]   181  HbA1c 5.2      [18 06:22]  TSH 2.15      [18 06:22]  Lipid: chol 97, TG 66, HDL 39, LDL 45      [18 @ 06:22]    HBsAb Nonreact      [18 @ 20:51]  HBsAg Nonreact      [18 18:18]  HBcAb Nonreact      [18 18:18]  HCV 0.20, Nonreact      [18 18:18]    ALBA: titer 1:160, pattern Homogeneous      [18 @ 20:54]  dsDNA 33      [18 @ 20:54]  C3 Complement 73      [18 @ 20:12]  C4 Complement 17      [18 @ 19:52]  ANCA: cANCA Negative, pANCA Negative, atypical ANCA Indeterminate Method interference due to ALBA fluorescence      [18 @ 20:54]  anti-GBM <0.2      [18 @ 23:27]  Syphilis Screen (Treponema Pallidum Ab) Negative      [18 @ 20:54]  PLA2R: SUMAYA 3, IFA Negative      [18 @ 23:27]  Free Light Chains: kappa 13.37, lambda 15.70, ratio = 0.85      [ @ 19:52]  Immunofixation Serum:   No Monoclonal Band Identified      [18 @ 19:52]      1. CKD /ESRD - now on HD  2. HTN   3. Anemia of CKD  4. MBD  5. Migraine      S/P CVC access , Needs AVF,  HD in AM,    Eventual LRD - Transplant

## 2018-09-23 NOTE — PROGRESS NOTE ADULT - ASSESSMENT
34 yo female patient with h/o CKD not on HD, Migraines,  HTN presenting with pedal edema up to 1/3 of distal of lower extremities b/l, and  palpebral b/l edema for the last 3 days, concerning of worsening of kidney function.     Patient is clinically stable. Renal function is still in failure and decreasing compared to previous hospitalization. Patient is on S/P CVC access and HD x2, with improvement of cr and BUN values. 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 Team will f/u with patient  regarding this as per patient  ·	S/p HD 09/20/2018,  09/21, to go again 9/22  ·	cont symptomatic meds for SE related to HD including Tylenol + Ondansetron 4mg IV PRN nausea/vomiting related to HD  ·	renal meds per nephro including calcitriol   Capsule 0.5 MICROGram(s) Oral daily, ergocalciferol 13114 Unit(s) Oral every week  ·	Nephro eval noted and appreciated  ·	Vascular surgery eval noted and appreciated   ·	pending DC after HD seat in community is established    Anemia of chronic disease  ·	stable  ·	baseline Hgb 9-10, w/o change from last admission.  ·	hx of transfusion of prbc during last admission  ·	d/c iron therapy, c/w epogen as per Nephro  ·	c/w ascorbic acid  Oral Tab/Cap - Peds 250 milliGRAM(s) Oral daily    Hypertension  ·	Stable  ·	goal BP <140/90, controlled  ·	c/w carvedilol 25 milliGRAM(s) Oral every 12 hours  ·	c/w amlodipine 10 mg Oral qd    Migraines  ·	stable, Asymptomatic  ·	Pain controlled as above.       Prophylactic measures  ·	heparin  Injectable 5000 Unit(s) SubCutaneous every 12 hours 34 yo female patient with h/o CKD not on HD, Migraines,  HTN presenting with pedal edema up to 1/3 of distal of lower extremities b/l, and  palpebral b/l edema for the last 3 days, concerning of worsening of kidney function.     Patient is clinically stable. Renal function is still in failure and decreasing compared to previous hospitalization. Patient is on S/P CVC access and HD x2, with improvement of cr and BUN values. 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 Team will f/u with patient  regarding this as per patient  ·	S/p HD 09/20/2018,  09/21, to go again 9/22  ·	cont symptomatic meds for SE related to HD including Tylenol + Ondansetron 4mg IV PRN nausea/vomiting related to HD  ·	renal meds per nephro including calcitriol   Capsule 0.5 MICROGram(s) Oral daily, ergocalciferol 12449 Unit(s) Oral every week  ·	Nephro eval noted and appreciated  ·	F/U Nutrition consul  ·	F/U PT consult.  ·	Vascular surgery eval noted and appreciated   ·	pending DC after HD seat in community is established    Anemia of chronic disease  ·	stable  ·	baseline Hgb 9-10, w/o change from last admission.  ·	hx of transfusion of prbc during last admission  ·	d/c iron therapy, c/w epogen as per Nephro  ·	c/w ascorbic acid  Oral Tab/Cap - Peds 250 milliGRAM(s) Oral daily    Hypertension  ·	Stable  ·	goal BP <140/90, controlled  ·	c/w carvedilol 25 milliGRAM(s) Oral every 12 hours  ·	c/w amlodipine 10 mg Oral qd    Migraines  ·	stable, Asymptomatic  ·	Pain controlled as above.       Prophylactic measures  ·	heparin  Injectable 5000 Unit(s) SubCutaneous every 12 hours      Disposition   ·	On 09/22 Discussed desires by patient for further care and wishes were expressed - mainly discussed the necessity for AV Fistula. She states that her  and sister have instructed her not to have fistula placed in hopes that renal function will return within next several months and long term HD will not be needed. Medical necessity and options discussed, she is aware that she may never have a full return of renal function but that also if she truly wanted, fistula could be removed in the event that she did have it placed. She is aware that not proceeding with fistula may affect her ability to find placement in a community HD seat.  NO Family present at the bedside.  ·	Palliative care eval was requested  ·	Will try to contact  again (Dr. Juárez left a voice message yesterday asking to contact us). 34 yo female patient with h/o CKD not on HD, Migraines,  HTN presenting with pedal edema up to 1/3 of distal of lower extremities b/l, and  palpebral b/l edema for the last 3 days, concerning of worsening of kidney function.     Patient is clinically stable. Renal function is still in failure and decreasing compared to previous hospitalization. Patient is on S/P CVC access and HD x2, with improvement of cr and BUN values. 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 Team will f/u with patient  regarding this as per patient  ·	S/p HD 09/20/2018,  09/21, to go again 9/22  ·	cont symptomatic meds for SE related to HD including Tylenol + Ondansetron 4mg IV PRN nausea/vomiting related to HD  ·	renal meds per nephro including calcitriol   Capsule 0.5 MICROGram(s) Oral daily, ergocalciferol 29799 Unit(s) Oral every week  ·	Nephro eval noted and appreciated  ·	F/U Nutrition consul  ·	F/U PT consult.  ·	Vascular surgery eval noted and appreciated   ·	pending DC after HD seat in community is established    Anemia of chronic disease  ·	stable  ·	baseline Hgb 9-10, w/o change from last admission.  ·	hx of transfusion of prbc during last admission  ·	d/c iron therapy, c/w epogen as per Nephro  ·	c/w ascorbic acid  Oral Tab/Cap - Peds 250 milliGRAM(s) Oral daily    Hypertension  ·	Stable  ·	goal BP <140/90, controlled  ·	c/w carvedilol 25 milliGRAM(s) Oral every 12 hours  ·	c/w amlodipine 10 mg Oral qd    Migraines  ·	stable, Asymptomatic  ·	Pain controlled as above.       Prophylactic measures  ·	heparin  Injectable 5000 Unit(s) SubCutaneous every 12 hours      Disposition   ·	On 09/22 Discussed desires by patient for further care and wishes were expressed - mainly discussed the necessity for AV Fistula. She states that her  and sister have instructed her not to have fistula placed in hopes that renal function will return within next several months and long term HD will not be needed. Medical necessity and options discussed, she is aware that she may never have a full return of renal function but that also if she truly wanted, fistula could be removed in the event that she did have it placed. She is aware that not proceeding with fistula may affect her ability to find placement in a community HD seat.  NO Family present at the bedside.  ·	Palliative care eval was requested  ·	Will try to contact  again (Dr. Juárez left a voice message yesterday asking to contact us)  · 32 yo female patient with h/o CKD not on HD, Migraines,  HTN presenting with pedal edema up to 1/3 of distal of lower extremities b/l, and  palpebral b/l edema for the last 3 days, concerning of worsening of kidney function.     Patient is clinically stable. Renal function is still in failure and decreasing compared to previous hospitalization. Patient is on S/P CVC access and HD x2, with improvement of cr and BUN values. 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 Team will f/u with patient  regarding this as per patient  ·	S/p HD 09/20/2018,  09/21, to go again 9/22  ·	cont symptomatic meds for SE related to HD including Tylenol + Ondansetron 4mg IV PRN nausea/vomiting related to HD  ·	renal meds per nephro including calcitriol   Capsule 0.5 MICROGram(s) Oral daily, ergocalciferol 99128 Unit(s) Oral every week  ·	Nephro eval noted and appreciated  ·	F/U Nutrition consul  ·	F/U PT consult.  ·	Vascular surgery eval noted and appreciated   ·	pending DC after HD seat in community is established    Anemia of chronic disease  ·	stable  ·	baseline Hgb 9-10, w/o change from last admission.  ·	hx of transfusion of prbc during last admission  ·	d/c iron therapy, c/w epogen as per Nephro  ·	c/w ascorbic acid  Oral Tab/Cap - Peds 250 milliGRAM(s) Oral daily    Hypertension  ·	Stable  ·	goal BP <140/90, controlled  ·	c/w carvedilol 25 milliGRAM(s) Oral every 12 hours  ·	c/w amlodipine 10 mg Oral qd    Migraines  ·	stable, Asymptomatic  ·	Pain controlled as above.       Prophylactic measures  ·	heparin  Injectable 5000 Unit(s) SubCutaneous every 12 hours      Disposition   ·	On 09/22 Discussed desires by patient for further care and wishes were expressed - mainly discussed the necessity for AV Fistula. She states that her  and sister have instructed her not to have fistula placed in hopes that renal function will return within next several months and long term HD will not be needed. Medical necessity and options discussed, she is aware that she may never have a full return of renal function but that also if she truly wanted, fistula could be removed in the event that she did have it placed. She is aware that not proceeding with fistula may affect her ability to find placement in a community HD seat.  NO Family present at the bedside.  ·	Palliative care eval was requested  ·	On 09/23 called  Anam in 4 different opportunities, left voice message. Called Patient to ask her to call her  in order to know if he was working and couldn't answer for that reason. Asked patient to let him know I was calling to talk to him, tried 2 times and no answer. 32 yo female patient with h/o CKD not on HD, Migraines,  HTN presenting with pedal edema up to 1/3 of distal of lower extremities b/l, and  palpebral b/l edema for the last 3 days, concerning of worsening of kidney function.     Patient is clinically stable. Renal function is still in failure and decreasing compared to previous hospitalization. Patient is on S/P CVC access and HD x2, with improvement of cr and BUN values. 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 Team will f/u with family meeting to discus more thoroughly, Palliative care cs to help coordinate pt wishes with medical plan  ·	S/p HD 09/20/2018,  09/21, 9/22, next session planned 9/24  ·	cont symptomatic meds for SE related to HD including Tylenol + Ondansetron 4mg IV PRN nausea/vomiting related to HD  ·	renal meds per nephro including calcitriol   Capsule 0.5 MICROGram(s) Oral daily, ergocalciferol 65998 Unit(s) Oral every week  ·	Nephro eval noted and appreciated  ·	F/U Nutrition consul  ·	F/U PT consult.  ·	Vascular surgery eval noted and appreciated   ·	pending DC after HD seat in community is established    Anemia of chronic disease  ·	stable  ·	baseline Hgb 9-10, w/o change from last admission.  ·	hx of transfusion of prbc during last admission  ·	d/c iron therapy, c/w epogen as per Nephro  ·	c/w ascorbic acid  Oral Tab/Cap - Peds 250 milliGRAM(s) Oral daily    Hypertension  ·	Stable  ·	goal BP <140/90, controlled  ·	c/w carvedilol 25 milliGRAM(s) Oral every 12 hours  ·	c/w amlodipine 10 mg Oral qd    Migraines  ·	stable, Asymptomatic  ·	Pain controlled as above.       Prophylactic measures  ·	heparin  Injectable 5000 Unit(s) SubCutaneous every 12 hours      Disposition   ·	On 09/22 Discussed desires by patient for further care and wishes were expressed - mainly discussed the necessity for AV Fistula. She states that her  and sister have instructed her not to have fistula placed in hopes that renal function will return within next several months and long term HD will not be needed. Medical necessity and options discussed, she is aware that she may never have a full return of renal function but that also if she truly wanted, fistula could be removed in the event that she did have it placed. She is aware that not proceeding with fistula may affect her ability to find placement in a community HD seat.  NO Family present at the bedside.  ·	Palliative care eval was requested  ·	On 09/23 called  Anam in 4 different opportunities, left voice message. Called Patient to ask her to call her  in order to know if he was working and couldn't answer for that reason. Asked patient to let him know I was calling to talk to him, tried 2 times and no answer.

## 2018-09-24 DIAGNOSIS — N18.5 CHRONIC KIDNEY DISEASE, STAGE 5: ICD-10-CM

## 2018-09-24 DIAGNOSIS — G43.009 MIGRAINE WITHOUT AURA, NOT INTRACTABLE, WITHOUT STATUS MIGRAINOSUS: ICD-10-CM

## 2018-09-24 DIAGNOSIS — Z51.5 ENCOUNTER FOR PALLIATIVE CARE: ICD-10-CM

## 2018-09-24 DIAGNOSIS — I10 ESSENTIAL (PRIMARY) HYPERTENSION: ICD-10-CM

## 2018-09-24 LAB
ANION GAP SERPL CALC-SCNC: 12 MMOL/L — SIGNIFICANT CHANGE UP (ref 5–17)
BUN SERPL-MCNC: 42 MG/DL — HIGH (ref 8–20)
CALCIUM SERPL-MCNC: 9.9 MG/DL — SIGNIFICANT CHANGE UP (ref 8.6–10.2)
CHLORIDE SERPL-SCNC: 103 MMOL/L — SIGNIFICANT CHANGE UP (ref 98–107)
CO2 SERPL-SCNC: 23 MMOL/L — SIGNIFICANT CHANGE UP (ref 22–29)
CREAT SERPL-MCNC: 6.49 MG/DL — HIGH (ref 0.5–1.3)
GLUCOSE SERPL-MCNC: 99 MG/DL — SIGNIFICANT CHANGE UP (ref 70–115)
MAGNESIUM SERPL-MCNC: 2.4 MG/DL — SIGNIFICANT CHANGE UP (ref 1.6–2.6)
PHOSPHATE SERPL-MCNC: 4.4 MG/DL — SIGNIFICANT CHANGE UP (ref 2.4–4.7)
POTASSIUM SERPL-MCNC: 4.5 MMOL/L — SIGNIFICANT CHANGE UP (ref 3.5–5.3)
POTASSIUM SERPL-SCNC: 4.5 MMOL/L — SIGNIFICANT CHANGE UP (ref 3.5–5.3)
SODIUM SERPL-SCNC: 138 MMOL/L — SIGNIFICANT CHANGE UP (ref 135–145)

## 2018-09-24 PROCEDURE — 99233 SBSQ HOSP IP/OBS HIGH 50: CPT

## 2018-09-24 PROCEDURE — 99233 SBSQ HOSP IP/OBS HIGH 50: CPT | Mod: GC

## 2018-09-24 RX ORDER — ASCORBIC ACID 60 MG
250 TABLET,CHEWABLE ORAL DAILY
Qty: 0 | Refills: 0 | Status: DISCONTINUED | OUTPATIENT
Start: 2018-09-25 | End: 2018-09-28

## 2018-09-24 RX ORDER — TUBERCULIN PURIFIED PROTEIN DERIVATIVE 5 [IU]/.1ML
5 INJECTION, SOLUTION INTRADERMAL ONCE
Qty: 0 | Refills: 0 | Status: DISCONTINUED | OUTPATIENT
Start: 2018-09-24 | End: 2018-09-24

## 2018-09-24 RX ADMIN — AMLODIPINE BESYLATE 10 MILLIGRAM(S): 2.5 TABLET ORAL at 05:40

## 2018-09-24 RX ADMIN — SEVELAMER CARBONATE 800 MILLIGRAM(S): 2400 POWDER, FOR SUSPENSION ORAL at 13:31

## 2018-09-24 RX ADMIN — HEPARIN SODIUM 5000 UNIT(S): 5000 INJECTION INTRAVENOUS; SUBCUTANEOUS at 05:40

## 2018-09-24 RX ADMIN — CALCITRIOL 0.5 MICROGRAM(S): 0.5 CAPSULE ORAL at 13:30

## 2018-09-24 RX ADMIN — Medication 100 MILLIGRAM(S): at 05:41

## 2018-09-24 RX ADMIN — HEPARIN SODIUM 5000 UNIT(S): 5000 INJECTION INTRAVENOUS; SUBCUTANEOUS at 17:25

## 2018-09-24 RX ADMIN — Medication 250 MILLIGRAM(S): at 13:29

## 2018-09-24 RX ADMIN — SEVELAMER CARBONATE 800 MILLIGRAM(S): 2400 POWDER, FOR SUSPENSION ORAL at 21:19

## 2018-09-24 RX ADMIN — CARVEDILOL PHOSPHATE 25 MILLIGRAM(S): 80 CAPSULE, EXTENDED RELEASE ORAL at 17:24

## 2018-09-24 RX ADMIN — CARVEDILOL PHOSPHATE 25 MILLIGRAM(S): 80 CAPSULE, EXTENDED RELEASE ORAL at 05:41

## 2018-09-24 RX ADMIN — SEVELAMER CARBONATE 800 MILLIGRAM(S): 2400 POWDER, FOR SUSPENSION ORAL at 05:40

## 2018-09-24 RX ADMIN — Medication 100 MILLIGRAM(S): at 17:24

## 2018-09-24 NOTE — DIETITIAN INITIAL EVALUATION ADULT. - PERTINENT LABORATORY DATA
09-24 Na138 mmol/L Glu 99 mg/dL K+ 4.5 mmol/L Cr  6.49 mg/dL<H> BUN 42.0 mg/dL<H> Phos 4.4 mg/dL Alb n/a   PAB n/a

## 2018-09-24 NOTE — CHART NOTE - NSCHARTNOTEFT_GEN_A_CORE
Upon Nutritional Assessment by the Registered Dietitian your patient was determined to meet criteria / has evidence of the following diagnosis/diagnoses:          [ ]  Mild Protein Calorie Malnutrition        [x ]  Moderate Protein Calorie Malnutrition        [ ] Severe Protein Calorie Malnutrition        [ ] Unspecified Protein Calorie Malnutrition        [ ] Underweight / BMI <19        [ ] Morbid Obesity / BMI > 40      Findings as based on:  •  Comprehensive nutrition assessment and consultation  •  Calorie counts (nutrient intake analysis)  •  Food acceptance and intake status from observations by staff  •  Follow up  •  Patient education  •  Intervention secondary to interdisciplinary rounds  •   concerns      Treatment:    The following diet has been recommended:    nepro shake BID     PROVIDER Section:     By signing this assessment you are acknowledging and agree with the diagnosis/diagnoses assigned by the Registered Dietitian    Comments:

## 2018-09-24 NOTE — CONSULT NOTE ADULT - PROBLEM SELECTOR RECOMMENDATION 4
-Met with patient and spouse at bedside  -Patient has no c/o discomforts at this time  -Patient speaks english but main language is Wolof  -Informed patient we need to have a family meeting to discuss her diagnosis and care for after discharge including AV fistula for placement in order to continue HD outpatient.  -Spoke with Dr. Szymanski, will plan a time that is convenient for family   - As per patient her  Anam is the only one now who needs to be present for a meeting because "he is my family" as per patient.  - Will continue to support and follow -Met with patient and spouse at bedside  -Patient has no c/o discomforts at this time  -Patient speaks english but main language is Bahamian  -Informed patient we need to have a family meeting to discuss her diagnosis and care for after discharge including AV fistula for placement in order to continue HD outpatient.  - As per patient her  Anam is the only one now who needs to be present for a meeting because "he is my family" as per patient.  Addendim: Met with patient, spouse, Dr. Szymanski at bedside. Spoke to Dr. Szymanski who discussed diagnosis and plan with patient and her spouse. I completed a HCP (B[place]ian version) with patient who designated her spouse Anam as her agent with the assist of Dr. Ramirez  - Will continue to support and follow

## 2018-09-24 NOTE — PROGRESS NOTE ADULT - SUBJECTIVE AND OBJECTIVE BOX
HD in AM,    Cleared for discharge, once OP HD Arrangements are completed per SW,    AVF may be done as OP ( But should have a surgical date set - A Mandate by CMS )

## 2018-09-24 NOTE — PROGRESS NOTE ADULT - ASSESSMENT
1. ESRD on HD  2. Anemia of Chronic Disease  3. HTN  4. Migraine Headache      Kidney bx during recent admission showed sclerotic changes, Features collapsing FSGS in the setting of mildly elevated dsDNA antibody and decreased C3  S/P CVC access 09/20/2018, BUN and Cr trending down after HD x  3   HD in AM  Hgb stable  Baseline Hgb 9-10, w/o change from last admission.  Continue epogen, ascorbic acid  Oral Tab/Cap - Peds 250 milliGRAM(s) Oral daily  goal BP <140/90, controlled  Continue carvedilol 25 milliGRAM(s) Oral every 12 hours, amlodipine 10 mg Oral qd  Migraine stable, asymptomatic

## 2018-09-24 NOTE — PROGRESS NOTE ADULT - ASSESSMENT
32 yo female patient with h/o CKD not on HD, Migraines,  HTN presenting with pedal edema up to 1/3 of distal of lower extremities b/l, and  palpebral b/l edema for the last 3 days, concerning of worsening of kidney function.     Patient is clinically stable. Renal function is still in failure and decreasing compared to previous hospitalization. Patient is on S/P CVC access and HD x2, with improvement of cr and BUN values. 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   ·	Team will discussed at length her kidney biopsy finding and likely ness of regaining renal fx. Explained the importance of placing an A/V fistula to decrease chance of infection and long term use. Patient understands and is agreeable to getting graft placed. Palliative care cs to help coordinate pt wishes with medical plan  ·	S/p HD 09/20/2018,  09/21, 9/22, next session planned 9/25  ·	cont symptomatic meds for SE related to HD including Tylenol + Ondansetron 4mg IV PRN nausea/vomiting related to HD  ·	renal meds per nephro including calcitriol   Capsule 0.5 MICROGram(s) Oral daily, ergocalciferol 22949 Unit(s) Oral every week  ·	Nephro eval noted and appreciated  ·	F/U Nutrition consul  ·	F/U PT consult.  ·	Vascular surgery eval noted and appreciated   ·	pending DC after HD seat in community is established  ·	scheduled for dialysis tomorrow    Anemia of chronic disease  ·	stable  ·	baseline Hgb 9-10, w/o change from last admission.  ·	hx of transfusion of prbc during last admission  ·	d/c iron therapy, c/w epogen as per Nephro  ·	c/w ascorbic acid  Oral Tab/Cap - Peds 250 milliGRAM(s) Oral daily    Hypertension  ·	Stable  ·	goal BP <140/90, controlled  ·	c/w carvedilol 25 milliGRAM(s) Oral every 12 hours  ·	c/w amlodipine 10 mg Oral qd    Migraines  ·	stable, Asymptomatic  ·	Pain controlled as above.       Prophylactic measures  ·	heparin  Injectable 5000 Unit(s) SubCutaneous every 12 hours      Disposition   ·	On 09/22 Discussed desires by patient for further care and wishes were expressed - mainly discussed the necessity for AV Fistula. She states that her  and sister have instructed her not to have fistula placed in hopes that renal function will return within next several months and long term HD will not be needed. Medical necessity and options discussed, she is aware that she may never have a full return of renal function but that also if she truly wanted, fistula could be removed in the event that she did have it placed. She is aware that not proceeding with fistula may affect her ability to find placement in a community HD seat.  NO Family present at the bedside.  ·	Palliative care eval was requested  ·	On 09/23 called  Anam in 4 different opportunities, left voice message. Called Patient to ask her to call her  in order to know if he was working and couldn't answer for that reason. Asked patient to let him know I was calling to talk to him, tried 2 times and no answer. 32 yo female patient with h/o CKD not on HD, Migraines,  HTN presenting with pedal edema up to 1/3 of distal of lower extremities b/l, and  palpebral b/l edema for the last 3 days, concerning of worsening of kidney function.     Patient is clinically stable. Renal function is still in failure and decreasing compared to previous hospitalization. Patient is on S/P CVC access and HD x2, with improvement of cr and BUN values. 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   ·	Team will discussed at length her kidney biopsy finding and likely ness of regaining renal fx. Explained the importance of placing an A/V fistula to decrease chance of infection and long term use. Patient understands and is agreeable to getting graft placed. Palliative care cs to help coordinate pt wishes with medical plan  ·	S/p HD 09/20/2018,  09/21, 9/22, next session planned 9/25  ·	cont symptomatic meds for SE related to HD including Tylenol + Ondansetron 4mg IV PRN nausea/vomiting related to HD  ·	renal meds per nephro including calcitriol   Capsule 0.5 MICROGram(s) Oral daily, ergocalciferol 93721 Unit(s) Oral every week  ·	Nephro eval noted and appreciated  ·	F/U Nutrition consul  ·	F/U PT consult.  ·	Vascular surgery eval noted and appreciated   ·	pending DC after HD seat in community is established  ·	scheduled for dialysis tomorrow  ·	Quantiferon Gold ordered.    Anemia of chronic disease  ·	stable  ·	baseline Hgb 9-10, w/o change from last admission.  ·	hx of transfusion of prbc during last admission  ·	d/c iron therapy, c/w epogen as per Nephro  ·	c/w ascorbic acid  Oral Tab/Cap - Peds 250 milliGRAM(s) Oral daily    Hypertension  ·	Stable  ·	goal BP <140/90, controlled  ·	c/w carvedilol 25 milliGRAM(s) Oral every 12 hours  ·	c/w amlodipine 10 mg Oral qd    Migraines  ·	stable, Asymptomatic  ·	Pain controlled as above.       Prophylactic measures  ·	heparin  Injectable 5000 Unit(s) SubCutaneous every 12 hours      Disposition   ·	On 09/22 Discussed desires by patient for further care and wishes were expressed - mainly discussed the necessity for AV Fistula. She states that her  and sister have instructed her not to have fistula placed in hopes that renal function will return within next several months and long term HD will not be needed. Medical necessity and options discussed, she is aware that she may never have a full return of renal function but that also if she truly wanted, fistula could be removed in the event that she did have it placed. She is aware that not proceeding with fistula may affect her ability to find placement in a community HD seat.  NO Family present at the bedside.  ·	Palliative care eval was requested  ·	On 09/23 called  Anam in 4 different opportunities, left voice message. Called Patient to ask her to call her  in order to know if he was working and couldn't answer for that reason. Asked patient to let him know I was calling to talk to him, tried 2 times and no answer. 33 y.o. female with h/o Migraines and HTN, recently d/c from [facility] on  9/4/18 with dx of acute on chronic renal failure had bx performed which resulted as FSGS; pt currently presented with worsening peripheral edema/ fluid overload and was noted to have worsening kidney dysfunction with metabolic acidosis and hyperkalemia. Pt was admitted with fluid overload and uremia requiring urgent HD. Pt had PC placed and was dialyzed. Nephrology continues to follow the patient. Vein mapping completed. Vascular to be consulted for AVF.       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  ·	Discussed at length with the pt that she will need long term dialysis. Explained the importance of placing an A/V fistula to decrease chance of infection with PC. Patient understands and is agreeable to getting AVF placed.  ·	S/p HD 09/20/2018,  09/21, 9/22, next session planned 9/25  ·	c/w calcitriol 0.5 MICROGram(s) Oral daily and ergocalciferol 43274 Unit(s) Oral every week  ·	Pt known to have ppd positive, quantiferon testing and cxr negative   ·	Nephro eval noted and appreciated  ·	Vascular surgery eval noted and appreciated, pt will need AVF placed prior to discharge in order to obtain HD seat in the community.     Anemia of chronic kidney disease  ·	stable  ·	baseline Hgb 9-10, w/o change from last admission.  ·	hx of transfusion of prbc during last admission due to post renal bx hemorrhage     Hypertension  ·	Stable  ·	goal BP <140/90, controlled  ·	c/w carvedilol 25 milliGRAM(s) Oral every 12 hours  ·	c/w amlodipine 10 mg Oral qd    Migraines  ·	stable, Asymptomatic  ·	Pain controlled as above.       Prophylactic measures  ·	heparin  Injectable 5000 Unit(s) SubCutaneous every 12 hours    Advanced Directives: Full Code  HCP: Patients spouse     Disposition: Pt will need HD seat in the community. D/w CM and SW. 33 y.o. female with h/o Migraines and HTN, recently d/c from [facility] on  9/4/18 with dx of acute on chronic renal failure had bx performed which resulted as FSGS; pt currently presented with worsening peripheral edema/ fluid overload and was noted to have worsening kidney dysfunction with metabolic acidosis and hyperkalemia. Pt was admitted with fluid overload and uremia requiring urgent HD. Pt had PC placed and was dialyzed. Nephrology continues to follow the patient. Vein mapping completed. Vascular to be consulted for AVF.       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  ·	Discussed at length with the pt that she will need long term dialysis. Explained the importance of placing an A/V fistula to decrease chance of infection with PC. Patient understands and is agreeable to getting AVF placed.  ·	S/p HD 09/20/2018,  09/21, 9/22, next session planned 9/25  ·	c/w calcitriol 0.5 MICROGram(s) Oral daily and ergocalciferol 60087 Unit(s) Oral every week  ·	Pt known to have ppd positive, quantiferon testing and cxr negative   ·	Vein mapping completed   ·	Nephro eval noted and appreciated  ·	Vascular surgery eval noted and appreciated, pt will need AVF placed prior to discharge in order to obtain HD seat in the community.     Anemia of chronic kidney disease  ·	stable  ·	baseline Hgb 9-10, w/o change from last admission.  ·	hx of transfusion of prbc during last admission due to post renal bx hemorrhage     Hypertension  ·	Stable  ·	goal BP <140/90, controlled  ·	c/w carvedilol 25 milliGRAM(s) Oral every 12 hours  ·	c/w amlodipine 10 mg Oral qd    Migraines  ·	stable, Asymptomatic  ·	Pain controlled as above.       Prophylactic measures  ·	heparin  Injectable 5000 Unit(s) SubCutaneous every 12 hours    Advanced Directives: Full Code  HCP: Patients spouse     Disposition: Pt will need HD seat in the community. D/w CM and SW. 33 y.o. female with h/o Migraines and HTN, recently d/c from [facility] on  9/4/18 with dx of acute on chronic renal failure had bx performed which resulted as FSGS; pt currently presented with worsening peripheral edema/ fluid overload and was noted to have worsening kidney dysfunction with metabolic acidosis and hyperkalemia. Pt was admitted with fluid overload and uremia requiring urgent HD. Pt had PC placed and was dialyzed. Nephrology continues to follow the patient. Vein mapping completed. Vascular to be consulted for AVF.       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  ·	Discussed at length with the pt that she will need long term dialysis. Explained the importance of placing an A/V fistula to decrease chance of infection with PC. Patient understands and is agreeable to getting AVF placed.  ·	S/p HD 09/20/2018,  09/21, 9/22, next session planned 9/25  ·	c/w calcitriol 0.5 MICROGram(s) Oral daily and ergocalciferol 20967 Unit(s) Oral every week  ·	Pt known to have ppd positive, quantiferon testing and cxr negative   ·	Vein mapping completed   ·	Nephro eval noted and appreciated  ·	Vascular surgery eval noted and appreciated, pt will need AVF placed prior to discharge in order to obtain HD seat in the community.     Anemia of chronic kidney disease  ·	stable  ·	baseline Hgb 9-10, w/o change from last admission.  ·	hx of transfusion of prbc during last admission due to post renal bx hemorrhage     Hypertension  ·	Stable  ·	goal BP <140/90, controlled  ·	c/w carvedilol 25 milliGRAM(s) Oral every 12 hours  ·	c/w amlodipine 10 mg Oral qd    Migraines  ·	stable, Asymptomatic  ·	Pain controlled as above.     Moderate protein calorie malnutrition   ·	c/w nephro po bid   ·	nutrition consult noted and appreciated       Prophylactic measures  ·	heparin  Injectable 5000 Unit(s) SubCutaneous every 12 hours    Advanced Directives: Full Code  HCP: Patients spouse     Disposition: Pt will need HD seat in the community. D/w CM and SW.

## 2018-09-24 NOTE — PROGRESS NOTE ADULT - SUBJECTIVE AND OBJECTIVE BOX
[facility] DIVISION OF KIDNEY DISEASES AND HYPERTENSION -- FOLLOW UP NOTE  --------------------------------------------------------------------------------  Chief Complaint:  ESRD on HD    24 hour events/subjective:  Pt seen and examined today  NAD  S/P CVC  HD in AM      PAST HISTORY  --------------------------------------------------------------------------------  No significant changes to PMH, PSH, FHx, SHx, unless otherwise noted    ALLERGIES & MEDICATIONS  --------------------------------------------------------------------------------  Allergies    No Known Allergies    Intolerances      Standing Inpatient Medications  amLODIPine   Tablet 10 milliGRAM(s) Oral daily  ascorbic acid  Oral Tab/Cap - Peds 250 milliGRAM(s) Oral daily  calcitriol   Capsule 0.5 MICROGram(s) Oral daily  carvedilol 25 milliGRAM(s) Oral every 12 hours  docusate sodium 100 milliGRAM(s) Oral two times a day  epoetin brianda Injectable 6000 Unit(s) IV Push <User Schedule>  ergocalciferol 62397 Unit(s) Oral every week  heparin  Injectable 5000 Unit(s) SubCutaneous every 12 hours  influenza   Vaccine 0.5 milliLiter(s) IntraMuscular once  sevelamer hydrochloride 800 milliGRAM(s) Oral three times a day    PRN Inpatient Medications  acetaminophen   Tablet .. 650 milliGRAM(s) Oral every 6 hours PRN  ondansetron    Tablet 4 milliGRAM(s) Oral every 8 hours PRN      REVIEW OF SYSTEMS  --------------------------------------------------------------------------------  Gen: No weight changes, fatigue, fevers/chills, weakness  Skin: No rashes  Head/Eyes/Ears/Mouth: No headache; Normal hearing; Normal vision w/o blurriness; No sinus pain/discomfort, sore throat  Respiratory: No dyspnea, cough, wheezing, hemoptysis  CV: No chest pain, PND, orthopnea  GI: No abdominal pain, diarrhea, constipation, nausea, vomiting, melena, hematochezia  : No increased frequency, dysuria, hematuria, nocturia  MSK: No joint pain/swelling; no back pain; no edema  Neuro: No dizziness/lightheadedness, weakness, seizures, numbness, tingling  Heme: No easy bruising or bleeding  Endo: No heat/cold intolerance  Psych: No significant nervousness, anxiety, stress, depression    All other systems were reviewed and are negative, except as noted.    VITALS/PHYSICAL EXAM  --------------------------------------------------------------------------------  T(C): 36.9 (09-24-18 @ 05:10), Max: 37.1 (09-23-18 @ 21:53)  HR: 77 (09-24-18 @ 05:10) (72 - 83)  BP: 113/67 (09-24-18 @ 05:10) (113/67 - 134/63)  RR: 18 (09-24-18 @ 05:10) (16 - 18)  SpO2: --  Wt(kg): --        09-23-18 @ 07:01  -  09-24-18 @ 07:00  --------------------------------------------------------  IN: 360 mL / OUT: 0 mL / NET: 360 mL      Physical Exam:  	Gen: NAD, well-appearing  	HEENT: PERRL, supple neck, clear oropharynx  	Pulm: CTA B/L  	CV: RRR, S1S2; no rub  	Back: No spinal or CVA tenderness; no sacral edema  	Abd: +BS, soft, nontender/nondistended  	: No suprapubic tenderness  	UE: Warm, FROM, no clubbing, intact strength; no edema; no asterixis  	LE: Warm, FROM, no clubbing, intact strength; no edema  	Neuro: No focal deficits, intact gait  	Psych: Normal affect and mood  	Skin: Warm, without rashes  	Vascular access:  CVC ++    LABS/STUDIES  --------------------------------------------------------------------------------              10.4   4.1   >-----------<  175      [09-23-18 @ 09:41]              33.0     138  |  103  |  42.0  ----------------------------<  99      [09-24-18 @ 08:41]  4.5   |  23.0  |  6.49        Ca     9.9     [09-24-18 @ 08:41]      Mg     2.4     [09-24-18 @ 08:41]      Phos  4.4     [09-24-18 @ 08:41]            Creatinine Trend:  SCr 6.49 [09-24 @ 08:41]  SCr 4.59 [09-23 @ 09:41]  SCr 4.35 [09-22 @ 07:32]  SCr 5.81 [09-21 @ 08:48]  SCr 7.24 [09-20 @ 07:30]    Urinalysis - [09-18-18 @ 14:01]      Color Yellow / Appearance Clear / SG 1.010 / pH 6.0      Gluc Negative / Ketone Negative  / Bili Negative / Urobili Negative       Blood Moderate / Protein 500 / Leuk Est Negative / Nitrite Negative      RBC 3-5 / WBC 3-5 / Hyaline  / Gran  / Sq Epi  / Non Sq Epi Occasional / Bacteria       Iron 45, TIBC 209, %sat 19      [08-31-18 @ 06:22]  Ferritin 58      [08-31-18 @ 06:22]  PTH -- (Ca 8.8)      [09-02-18 @ 14:18]   206  PTH -- (Ca 8.8)      [09-01-18 @ 19:52]   181  HbA1c 5.2      [08-31-18 @ 06:22]  TSH 2.15      [08-31-18 @ 06:22]  Lipid: chol 97, TG 66, HDL 39, LDL 45      [08-31-18 @ 06:22]    HBsAb <3.0      [09-21-18 @ 19:57]  HBsAb Nonreact      [09-01-18 @ 20:51]  HBsAg Nonreact      [09-21-18 @ 19:57]  HBcAb Nonreact      [09-21-18 @ 19:57]  HCV 0.10, Nonreact      [09-21-18 @ 19:57]    ALBA: titer 1:160, pattern Homogeneous      [09-01-18 @ 20:54]  dsDNA 33      [09-01-18 @ 20:54]  C3 Complement 73      [09-01-18 @ 20:12]  C4 Complement 17      [09-01-18 @ 19:52]  ANCA: cANCA Negative, pANCA Negative, atypical ANCA Indeterminate Method interference due to ALBA fluorescence      [09-01-18 @ 20:54]  anti-GBM <0.2      [09-01-18 @ 23:27]  Syphilis Screen (Treponema Pallidum Ab) Negative      [09-01-18 @ 20:54]  PLA2R: SUMAYA 3, IFA Negative      [09-01-18 @ 23:27]  Free Light Chains: kappa 13.37, lambda 15.70, ratio = 0.85      [09-01 @ 19:52]  Immunofixation Serum:   No Monoclonal Band Identified      [09-01-18 @ 19:52]

## 2018-09-24 NOTE — PROGRESS NOTE ADULT - SUBJECTIVE AND OBJECTIVE BOX
CC: Patient is a 33y old  Female who presents with a chief complaint of edema of lower extremities      Interval events  Patient examined at bedside.  As per nurse, no acute events over night.   As per patient, she still feels mild discomfort from placement of CVC. Patient states the pedal edema has resolved. Patient is eating w/o nausea, vomiting. Patient states she is still hopeful her kidney function would get better but medicine team discussed at length her kidney function/damage and the need for a a/v fistula placement. Pt understands the need for permanent dialysis.  Patient is ambulating. Patient is voiding and has had a BM.    ROS: Denies fever, chest pain, SOB, abdominal pain, diarrhea, constipation, calf pain.      Vital Signs Last 24 Hrs    Vital Signs Last 24 Hrs  T(C): 36.7 (24 Sep 2018 09:55), Max: 37.1 (23 Sep 2018 21:53)  T(F): 98 (24 Sep 2018 09:55), Max: 98.7 (23 Sep 2018 21:53)  HR: 80 (24 Sep 2018 09:55) (72 - 80)  BP: 116/66 (24 Sep 2018 09:55) (113/67 - 126/76)  RR: 18 (24 Sep 2018 09:55) (16 - 18)  SpO2: 97% (24 Sep 2018 09:55) (97% - 97%)    Physical Exam:   GENERAL: NAD, well-groomed, well-developed. Cooperative.   HEAD:  Atraumatic, Normocephalic  EYES: EOMI, PERRLA, conjunctiva and sclera clear.    NECK: Supple, No JVD  NEURO: Alert & Oriented X3, Good concentration; Motor Strength 5/5 B/L upper and lower extremities;   RESP: Rt sided neck CVC access covered with clean and dry dressing, Clear to auscultation bilaterally; No rales, rhonchi, wheezing, or rubs  CVS: Regular rate and rhythm; No murmurs, rubs, or gallops. Capillary refill <2s  GI: Soft, Nontender, Nondistended; Bowel sounds present  EXTREMITIES:  2+ Peripheral Pulses, No clubbing, cyanosis, or edema.  SKIN: No rashes or lesions.        LABS    Complete blood count  ______________________________________________________________                                   10.4   4.1   )-----------( 175      ( 23 Sep 2018 09:41 )             33.0                  10.4   4.1   )-----------( 175      ( 23 Sep 2018 09:41 )             33.0                    9.8    3.6   )-----------( 166      ( 22 Sep 2018 07:32 )             30.5       Chemistry   _______________________________________________________________  09-24    138  |  103  |  42.0<H>  ----------------------------<  99  4.5   |  23.0  |  6.49<H>    Ca    9.9      24 Sep 2018 08:41  Phos  4.4     09-24  Mg     2.4     09-24 09-23    138  |  100  |  23.0<H>  ----------------------------<  152<H>  4.5   |  24.0  |  4.59<H>    Ca    9.9      23 Sep 2018 09:41      09-22    140  |  101  |  25.0<H>  ----------------------------<  109  4.1   |  27.0  |  4.35<H>    Ca    9.3      22 Sep 2018 07:32  Phos  4.6     09-21  Mg     2.1     09-21 09-21 @ 07:01  -  09-22 @ 07:00  --------------------------------------------------------  IN: 120 mL / OUT: 500 mL / NET: -380 mL      Radiology  _______________________________________________________________    US Vessel Mapping Hemodialysis (09.19.18 @ 17:48)   IMPRESSION:  NO EVIDENCE OF DEEP VEIN THROMBOSIS.  CEPHALIC AND BASILIC MEASUREMENTS AS DESCRIBED      MEDICATIONS  (STANDING):  amLODIPine   Tablet 10 milliGRAM(s) Oral daily  calcitriol   Capsule 0.5 MICROGram(s) Oral daily  carvedilol 25 milliGRAM(s) Oral every 12 hours  docusate sodium 100 milliGRAM(s) Oral two times a day  epoetin brianda Injectable 6000 Unit(s) IV Push <User Schedule>  ergocalciferol 74790 Unit(s) Oral every week  heparin  Injectable 5000 Unit(s) SubCutaneous every 12 hours  influenza   Vaccine 0.5 milliLiter(s) IntraMuscular once  sevelamer hydrochloride 800 milliGRAM(s) Oral three times a day    MEDICATIONS  (PRN):  acetaminophen   Tablet .. 650 milliGRAM(s) Oral every 6 hours PRN Mild Pain (1 - 3), Moderate Pain (4 - 6)  ondansetron    Tablet 4 milliGRAM(s) Oral every 8 hours PRN Nausea and/or Vomiting CC: Patient is a 33y old  Female who presents with a chief complaint of edema of lower extremities      Interval events  Patient examined at bedside.  As per nurse, no acute events over night.   As per patient, she still feels mild discomfort from placement of CVC. Patient states the pedal edema has resolved. Patient is eating w/o nausea, vomiting. Patient states she is still hopeful her kidney function would get better but medicine team discussed at length her kidney function/damage and the need for a a/v fistula placement. Pt understands the need for permanent dialysis. Plan was also discussed with her family.  Patient is ambulating. Patient is voiding and has had a BM.    ROS: Denies fever, chest pain, SOB, abdominal pain, diarrhea, constipation, calf pain.      Vital Signs Last 24 Hrs    Vital Signs Last 24 Hrs  T(C): 37.1 (24 Sep 2018 16:12), Max: 37.1 (23 Sep 2018 21:53)  T(F): 98.7 (24 Sep 2018 16:12), Max: 98.7 (23 Sep 2018 21:53)  HR: 76 (24 Sep 2018 16:12) (72 - 80)  BP: 121/87 (24 Sep 2018 16:12) (113/67 - 126/76)  RR: 18 (24 Sep 2018 16:12) (16 - 18)  SpO2: 97% (24 Sep 2018 09:55) (97% - 97%)    Physical Exam:   GENERAL: NAD, well-groomed, well-developed. Cooperative.   HEAD:  Atraumatic, Normocephalic  EYES: EOMI, PERRLA, conjunctiva and sclera clear.    NECK: Supple, No JVD  NEURO: Alert & Oriented X3, Good concentration; Motor Strength 5/5 B/L upper and lower extremities;   RESP: Rt sided neck CVC access covered with clean and dry dressing, Clear to auscultation bilaterally; No rales, rhonchi, wheezing, or rubs  CVS: Regular rate and rhythm; No murmurs, rubs, or gallops. Capillary refill <2s  GI: Soft, Nontender, Nondistended; Bowel sounds present  EXTREMITIES:  2+ Peripheral Pulses, No clubbing, cyanosis, or edema.  SKIN: No rashes or lesions.        LABS    Complete blood count  ______________________________________________________________                                          10.4   4.1   )-----------( 175      ( 23 Sep 2018 09:41 )             33.0                    9.8    3.6   )-----------( 166      ( 22 Sep 2018 07:32 )             30.5       Chemistry   _______________________________________________________________  09-24    138  |  103  |  42.0<H>  ----------------------------<  99  4.5   |  23.0  |  6.49<H>    Ca    9.9      24 Sep 2018 08:41  Phos  4.4     09-24  Mg     2.4     09-24 09-23    138  |  100  |  23.0<H>  ----------------------------<  152<H>  4.5   |  24.0  |  4.59<H>    Ca    9.9      23 Sep 2018 09:41      09-22    140  |  101  |  25.0<H>  ----------------------------<  109  4.1   |  27.0  |  4.35<H>    Ca    9.3      22 Sep 2018 07:32  Phos  4.6     09-21  Mg     2.1     09-21 09-21 @ 07:01  -  09-22 @ 07:00  --------------------------------------------------------  IN: 120 mL / OUT: 500 mL / NET: -380 mL      Radiology  _______________________________________________________________    US Vessel Mapping Hemodialysis (09.19.18 @ 17:48)   IMPRESSION:  NO EVIDENCE OF DEEP VEIN THROMBOSIS.  CEPHALIC AND BASILIC MEASUREMENTS AS DESCRIBED      MEDICATIONS  (STANDING):  amLODIPine   Tablet 10 milliGRAM(s) Oral daily  calcitriol   Capsule 0.5 MICROGram(s) Oral daily  carvedilol 25 milliGRAM(s) Oral every 12 hours  docusate sodium 100 milliGRAM(s) Oral two times a day  epoetin brianda Injectable 6000 Unit(s) IV Push <User Schedule>  ergocalciferol 47515 Unit(s) Oral every week  heparin  Injectable 5000 Unit(s) SubCutaneous every 12 hours  influenza   Vaccine 0.5 milliLiter(s) IntraMuscular once  sevelamer hydrochloride 800 milliGRAM(s) Oral three times a day    MEDICATIONS  (PRN):  acetaminophen   Tablet .. 650 milliGRAM(s) Oral every 6 hours PRN Mild Pain (1 - 3), Moderate Pain (4 - 6)  ondansetron    Tablet 4 milliGRAM(s) Oral every 8 hours PRN Nausea and/or Vomiting CC: Patient is a 33y old  Female who presents with a chief complaint of edema of lower extremities      Interval events  Patient examined at bedside.  As per nurse, no acute events over night.   As per patient, she still feels mild discomfort from placement of CVC. Patient states the pedal edema has resolved. Patient is eating w/o nausea, vomiting. Patient states she is still hopeful her kidney function would get better but medicine team discussed at length her kidney function/damage and the need for a a/v fistula placement. Pt understands the need for permanent dialysis. Plan was also discussed with her family.  Patient is ambulating. Patient is voiding and has had a BM.    ROS: Denies fever, chest pain, SOB, abdominal pain, diarrhea, constipation, calf pain.      Vital Signs Last 24 Hrs    Vital Signs Last 24 Hrs  T(C): 37.1 (24 Sep 2018 16:12), Max: 37.1 (23 Sep 2018 21:53)  T(F): 98.7 (24 Sep 2018 16:12), Max: 98.7 (23 Sep 2018 21:53)  HR: 76 (24 Sep 2018 16:12) (72 - 80)  BP: 121/87 (24 Sep 2018 16:12) (113/67 - 126/76)  RR: 18 (24 Sep 2018 16:12) (16 - 18)  SpO2: 97% (24 Sep 2018 09:55) (97% - 97%)    Physical Exam:   GENERAL: NAD, well-groomed, well-developed. Cooperative.   HEAD:  Atraumatic, Normocephalic  EYES: EOMI, PERRLA, conjunctiva and sclera clear.    NECK: Supple, No JVD  NEURO: Alert & Oriented X3, Good concentration; Motor Strength 5/5 B/L upper and lower extremities;   RESP: Rt sided neck CVC access covered with clean and dry dressing, Clear to auscultation bilaterally; No rales, rhonchi, wheezing, or rubs  CVS: Regular rate and rhythm; No murmurs, rubs, or gallops. Capillary refill <2s  GI: Soft, Nontender, Nondistended; Bowel sounds present  EXTREMITIES:  2+ Peripheral Pulses, No clubbing, cyanosis, or edema.  SKIN: No rashes or lesions.        LABS    Complete blood count  ______________________________________________________________                                          10.4   4.1   )-----------( 175      ( 23 Sep 2018 09:41 )             33.0                    9.8    3.6   )-----------( 166      ( 22 Sep 2018 07:32 )             30.5       Chemistry   _______________________________________________________________  09-24    138  |  103  |  42.0<H>  ----------------------------<  99  4.5   |  23.0  |  6.49<H>    Ca    9.9      24 Sep 2018 08:41  Phos  4.4     09-24  Mg     2.4     09-24 09-23    138  |  100  |  23.0<H>  ----------------------------<  152<H>  4.5   |  24.0  |  4.59<H>    Ca    9.9      23 Sep 2018 09:41      09-22    140  |  101  |  25.0<H>  ----------------------------<  109  4.1   |  27.0  |  4.35<H>    Ca    9.3      22 Sep 2018 07:32  Phos  4.6     09-21  Mg     2.1     09-21        I&O's Summary    23 Sep 2018 07:01  -  24 Sep 2018 07:00  --------------------------------------------------------  IN: 360 mL / OUT: 0 mL / NET: 360 mL        Radiology  _______________________________________________________________    US Vessel Mapping Hemodialysis (09.19.18 @ 17:48)   IMPRESSION:  NO EVIDENCE OF DEEP VEIN THROMBOSIS.  CEPHALIC AND BASILIC MEASUREMENTS AS DESCRIBED      MEDICATIONS  (STANDING):  amLODIPine   Tablet 10 milliGRAM(s) Oral daily  calcitriol   Capsule 0.5 MICROGram(s) Oral daily  carvedilol 25 milliGRAM(s) Oral every 12 hours  docusate sodium 100 milliGRAM(s) Oral two times a day  epoetin brianda Injectable 6000 Unit(s) IV Push <User Schedule>  ergocalciferol 57477 Unit(s) Oral every week  heparin  Injectable 5000 Unit(s) SubCutaneous every 12 hours  influenza   Vaccine 0.5 milliLiter(s) IntraMuscular once  sevelamer hydrochloride 800 milliGRAM(s) Oral three times a day    MEDICATIONS  (PRN):  acetaminophen   Tablet .. 650 milliGRAM(s) Oral every 6 hours PRN Mild Pain (1 - 3), Moderate Pain (4 - 6)  ondansetron    Tablet 4 milliGRAM(s) Oral every 8 hours PRN Nausea and/or Vomiting

## 2018-09-24 NOTE — DIETITIAN INITIAL EVALUATION ADULT. - OTHER INFO
Spoke with pt via [place] , Pt reports eating poorly over past 3-4 months, +nausea and vomiting. reports losing approx 15#. Pt reports appetite slowly coming back, family bringing in food from home which pt eats.  Pt is new to HD, Dialysis diet education provided, reviewed and reinforced literature. physical signs of malnutrition [ ]absent [ ]present. [ ]Mild [x ]Moderate [ ]Severe Muscle wasting present at [x ]clavicle [ ]interosseos [ ]calf. [ ]Mild [ ]Moderate [ ]Severe subcutaneous fat loss presents at [ ]ribs []orbital region [ ]triceps [ ]buccal area.

## 2018-09-24 NOTE — CONSULT NOTE ADULT - ASSESSMENT
31 year old female in ESRD stage 5 with elevated BUN CRT receiving HD at [facility] now, contemplating AV fistula 31 year old female in ESRD stage 5 with elevated BUN CRT receiving HD at [facility] now via perma cath will need AV fistula for long term HD

## 2018-09-24 NOTE — CONSULT NOTE ADULT - SUBJECTIVE AND OBJECTIVE BOX
HPI: This is a 33 year old female PMHx CKD stage 5, migraines, HTN presented to [facility] complaints of edema and nausea for last 3 days worse in the morning and gradually decreases through the day. This is a palliative care consult.    PERTINENT PMH REVIEWED: Yes No    PAST MEDICAL & SURGICAL HISTORY:  Hyperprolactinemia  Stage 5 chronic kidney disease not on chronic dialysis  Hypertension, unspecified type  Migraine without aura, not intractable, without status migrainosus  History of bilateral tubal ligation      SOCIAL HISTORY:                                     Admitted from:  home  SNF  [place]     Surrogate/HCP/Guardian: Phone#:    FAMILY HISTORY:  Family history of renal failure syndrome (Uncle)  Family history of hypertension (Father)      Baseline ADLs (prior to admission):  Independent/ Dependent      Allergies    No Known Allergies    Intolerances        Present Symptoms:   Dyspnea: 0   Nausea/Vomiting: No  Anxiety:  No  Depression: No  Fatigue: No  Loss of appetite: No    Pain: Denies pain            Character-            Duration-            Effect-            Factors-            Frequency-            Location-            Severity-    Review of Systems: Reviewed                     Negative: No shortness of breath  All others negative    MEDICATIONS  (STANDING):  amLODIPine   Tablet 10 milliGRAM(s) Oral daily  ascorbic acid  Oral Tab/Cap - Peds 250 milliGRAM(s) Oral daily  calcitriol   Capsule 0.5 MICROGram(s) Oral daily  carvedilol 25 milliGRAM(s) Oral every 12 hours  docusate sodium 100 milliGRAM(s) Oral two times a day  epoetin brianda Injectable 6000 Unit(s) IV Push <User Schedule>  ergocalciferol 92096 Unit(s) Oral every week  heparin  Injectable 5000 Unit(s) SubCutaneous every 12 hours  influenza   Vaccine 0.5 milliLiter(s) IntraMuscular once  sevelamer hydrochloride 800 milliGRAM(s) Oral three times a day    MEDICATIONS  (PRN):  acetaminophen   Tablet .. 650 milliGRAM(s) Oral every 6 hours PRN Mild Pain (1 - 3), Moderate Pain (4 - 6)  ondansetron    Tablet 4 milliGRAM(s) Oral every 8 hours PRN Nausea and/or Vomiting      PHYSICAL EXAM:    Vital Signs Last 24 Hrs  T(C): 36.9 (24 Sep 2018 05:10), Max: 37.1 (23 Sep 2018 21:53)  T(F): 98.4 (24 Sep 2018 05:10), Max: 98.7 (23 Sep 2018 21:53)  HR: 77 (24 Sep 2018 05:10) (72 - 83)  BP: 113/67 (24 Sep 2018 05:10) (113/67 - 134/63)  BP(mean): --  RR: 18 (24 Sep 2018 05:10) (16 - 18)  SpO2: --    General: alert  oriented x _4___    HEENT: normal      Lungs: comfortable    CV: normal      GI: normal      : HD - elevated BUN CRT stage 5 ESRD    MSK: edema BLE - ambulating    Skin: normal  LABS:                        10.4   4.1   )-----------( 175      ( 23 Sep 2018 09:41 )             33.0     09-24    138  |  103  |  42.0<H>  ----------------------------<  99  4.5   |  23.0  |  6.49<H>    Ca    9.9      24 Sep 2018 08:41  Phos  4.4     09-24  Mg     2.4     09-24          I&O's Summary    23 Sep 2018 07:01  -  24 Sep 2018 07:00  --------------------------------------------------------  IN: 360 mL / OUT: 0 mL / NET: 360 mL        RADIOLOGY & ADDITIONAL STUDIES:    ADVANCE DIRECTIVES:   FULL CODE HPI: This is a 33 year old female PMHx CKD stage 5, migraines, HTN presented to [facility] complaints of edema and nausea for last 3 days worse in the morning and gradually decreases through the day. This is a palliative care consult.    PERTINENT PMH REVIEWED: Yes No    PAST MEDICAL & SURGICAL HISTORY:  Hyperprolactinemia  Stage 5 chronic kidney disease not on chronic dialysis  Hypertension, unspecified type  Migraine without aura, not intractable, without status migrainosus  History of bilateral tubal ligation      SOCIAL HISTORY:                                     Admitted from:  home   Surrogate/HCP/Guardian: Phone#: Anam ()     FAMILY HISTORY:  Family history of renal failure syndrome (Uncle)  Family history of hypertension (Father)    Baseline ADLs (prior to admission):  Independent    Allergies  No Known Allergies  Intolerances        Present Symptoms:   Dyspnea: 0   Nausea/Vomiting: No  Anxiety:  No  Depression: No  Fatigue: No  Loss of appetite: No    Pain: Denies pain            Character-            Duration-            Effect-            Factors-            Frequency-            Location-            Severity-    Review of Systems: Reviewed                     Negative: No shortness of breath  All others negative    MEDICATIONS  (STANDING):  amLODIPine   Tablet 10 milliGRAM(s) Oral daily  ascorbic acid  Oral Tab/Cap - Peds 250 milliGRAM(s) Oral daily  calcitriol   Capsule 0.5 MICROGram(s) Oral daily  carvedilol 25 milliGRAM(s) Oral every 12 hours  docusate sodium 100 milliGRAM(s) Oral two times a day  epoetin brianda Injectable 6000 Unit(s) IV Push <User Schedule>  ergocalciferol 89107 Unit(s) Oral every week  heparin  Injectable 5000 Unit(s) SubCutaneous every 12 hours  influenza   Vaccine 0.5 milliLiter(s) IntraMuscular once  sevelamer hydrochloride 800 milliGRAM(s) Oral three times a day    MEDICATIONS  (PRN):  acetaminophen   Tablet .. 650 milliGRAM(s) Oral every 6 hours PRN Mild Pain (1 - 3), Moderate Pain (4 - 6)  ondansetron    Tablet 4 milliGRAM(s) Oral every 8 hours PRN Nausea and/or Vomiting      PHYSICAL EXAM:    Vital Signs Last 24 Hrs  T(C): 36.9 (24 Sep 2018 05:10), Max: 37.1 (23 Sep 2018 21:53)  T(F): 98.4 (24 Sep 2018 05:10), Max: 98.7 (23 Sep 2018 21:53)  HR: 77 (24 Sep 2018 05:10) (72 - 83)  BP: 113/67 (24 Sep 2018 05:10) (113/67 - 134/63)  BP(mean): --  RR: 18 (24 Sep 2018 05:10) (16 - 18)  SpO2: --    General: alert  oriented x _4___    HEENT: normal      Lungs: comfortable    CV: normal      GI: normal      : HD - elevated BUN CRT stage 5 ESRD    MSK: edema BLE - ambulating    Skin: normal  LABS:                        10.4   4.1   )-----------( 175      ( 23 Sep 2018 09:41 )             33.0     09-24    138  |  103  |  42.0<H>  ----------------------------<  99  4.5   |  23.0  |  6.49<H>    Ca    9.9      24 Sep 2018 08:41  Phos  4.4     09-24  Mg     2.4     09-24          I&O's Summary    23 Sep 2018 07:01  -  24 Sep 2018 07:00  --------------------------------------------------------  IN: 360 mL / OUT: 0 mL / NET: 360 mL      ADVANCE DIRECTIVES:   FULL CODE

## 2018-09-25 LAB
ANION GAP SERPL CALC-SCNC: 13 MMOL/L — SIGNIFICANT CHANGE UP (ref 5–17)
BUN SERPL-MCNC: 57 MG/DL — HIGH (ref 8–20)
CALCIUM SERPL-MCNC: 10 MG/DL — SIGNIFICANT CHANGE UP (ref 8.6–10.2)
CHLORIDE SERPL-SCNC: 105 MMOL/L — SIGNIFICANT CHANGE UP (ref 98–107)
CO2 SERPL-SCNC: 21 MMOL/L — LOW (ref 22–29)
CREAT SERPL-MCNC: 7.46 MG/DL — HIGH (ref 0.5–1.3)
GLUCOSE SERPL-MCNC: 90 MG/DL — SIGNIFICANT CHANGE UP (ref 70–115)
HCT VFR BLD CALC: 31.4 % — LOW (ref 37–47)
HGB BLD-MCNC: 9.8 G/DL — LOW (ref 12–16)
MAGNESIUM SERPL-MCNC: 2.5 MG/DL — SIGNIFICANT CHANGE UP (ref 1.6–2.6)
MCHC RBC-ENTMCNC: 26.7 PG — LOW (ref 27–31)
MCHC RBC-ENTMCNC: 31.2 G/DL — LOW (ref 32–36)
MCV RBC AUTO: 85.6 FL — SIGNIFICANT CHANGE UP (ref 81–99)
PHOSPHATE SERPL-MCNC: 4.7 MG/DL — SIGNIFICANT CHANGE UP (ref 2.4–4.7)
PLATELET # BLD AUTO: 192 K/UL — SIGNIFICANT CHANGE UP (ref 150–400)
POTASSIUM SERPL-MCNC: 4.9 MMOL/L — SIGNIFICANT CHANGE UP (ref 3.5–5.3)
POTASSIUM SERPL-SCNC: 4.9 MMOL/L — SIGNIFICANT CHANGE UP (ref 3.5–5.3)
RBC # BLD: 3.67 M/UL — LOW (ref 4.4–5.2)
RBC # FLD: 14.2 % — SIGNIFICANT CHANGE UP (ref 11–15.6)
SODIUM SERPL-SCNC: 139 MMOL/L — SIGNIFICANT CHANGE UP (ref 135–145)
WBC # BLD: 4 K/UL — LOW (ref 4.8–10.8)
WBC # FLD AUTO: 4 K/UL — LOW (ref 4.8–10.8)

## 2018-09-25 PROCEDURE — 99232 SBSQ HOSP IP/OBS MODERATE 35: CPT

## 2018-09-25 PROCEDURE — 99232 SBSQ HOSP IP/OBS MODERATE 35: CPT | Mod: GC

## 2018-09-25 PROCEDURE — 90935 HEMODIALYSIS ONE EVALUATION: CPT

## 2018-09-25 RX ADMIN — CALCITRIOL 0.5 MICROGRAM(S): 0.5 CAPSULE ORAL at 13:31

## 2018-09-25 RX ADMIN — SEVELAMER CARBONATE 800 MILLIGRAM(S): 2400 POWDER, FOR SUSPENSION ORAL at 22:18

## 2018-09-25 RX ADMIN — CARVEDILOL PHOSPHATE 25 MILLIGRAM(S): 80 CAPSULE, EXTENDED RELEASE ORAL at 05:46

## 2018-09-25 RX ADMIN — AMLODIPINE BESYLATE 10 MILLIGRAM(S): 2.5 TABLET ORAL at 05:46

## 2018-09-25 RX ADMIN — SEVELAMER CARBONATE 800 MILLIGRAM(S): 2400 POWDER, FOR SUSPENSION ORAL at 05:46

## 2018-09-25 RX ADMIN — CARVEDILOL PHOSPHATE 25 MILLIGRAM(S): 80 CAPSULE, EXTENDED RELEASE ORAL at 17:30

## 2018-09-25 RX ADMIN — Medication 250 MILLIGRAM(S): at 13:30

## 2018-09-25 RX ADMIN — HEPARIN SODIUM 5000 UNIT(S): 5000 INJECTION INTRAVENOUS; SUBCUTANEOUS at 05:47

## 2018-09-25 RX ADMIN — ERGOCALCIFEROL 50000 UNIT(S): 1.25 CAPSULE ORAL at 13:31

## 2018-09-25 RX ADMIN — Medication 100 MILLIGRAM(S): at 05:46

## 2018-09-25 RX ADMIN — HEPARIN SODIUM 5000 UNIT(S): 5000 INJECTION INTRAVENOUS; SUBCUTANEOUS at 17:30

## 2018-09-25 RX ADMIN — ERYTHROPOIETIN 6000 UNIT(S): 10000 INJECTION, SOLUTION INTRAVENOUS; SUBCUTANEOUS at 10:14

## 2018-09-25 RX ADMIN — Medication 100 MILLIGRAM(S): at 17:30

## 2018-09-25 RX ADMIN — SEVELAMER CARBONATE 800 MILLIGRAM(S): 2400 POWDER, FOR SUSPENSION ORAL at 13:32

## 2018-09-25 NOTE — PROGRESS NOTE ADULT - PROBLEM SELECTOR PLAN 1
-Patient receiving HD at the hospital through perma cath   - Patient is agreeable for AV fistula placement either here or outpatient

## 2018-09-25 NOTE — PROGRESS NOTE ADULT - ASSESSMENT
33 y.o. female with h/o Migraines and HTN, recently d/c from [facility] on  9/4/18 with dx of acute on chronic renal failure had bx performed which resulted as FSGS; pt currently presented with worsening peripheral edema/ fluid overload and was noted to have worsening kidney dysfunction with metabolic acidosis and hyperkalemia. Pt was admitted with fluid overload and uremia requiring urgent HD. Pt had PC placed and was dialyzed. Nephrology continues to follow the patient. Vein mapping completed. Vascular consulted for AVF.       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  ·	Discussed at length with the pt that she will need long term dialysis. Explained the importance of placing an A/V fistula to decrease chance of infection with PC. Patient understands and is agreeable to getting AVF placed.  ·	S/p HD 09/20/2018,  09/21, 9/22, next session planned 9/25  ·	c/w calcitriol 0.5 MICROGram(s) Oral daily and ergocalciferol 01547 Unit(s) Oral every week  ·	Pt known to have ppd positive, quantiferon testing and cxr negative   ·	Vein mapping completed   ·	Nephro eval noted and appreciated  ·	Vascular surgery eval noted and appreciated, pt will need AVF placed prior to discharge in order to obtain HD seat in the community.   ·	Possibility of ACF placement before discharge.    Anemia of chronic kidney disease  ·	stable  ·	baseline Hgb 9-10, w/o change from last admission.  ·	hx of transfusion of prbc during last admission due to post renal bx hemorrhage     Hypertension  ·	Stable  ·	goal BP <140/90, controlled  ·	c/w carvedilol 25 milliGRAM(s) Oral every 12 hours    Migraines  ·	stable, Asymptomatic  ·	Pain controlled as above.     Moderate protein calorie malnutrition   ·	c/w nephro po bid   ·	nutrition consult noted and appreciated       Prophylactic measures  ·	heparin  Injectable 5000 Unit(s) SubCutaneous every 12 hours    Advanced Directives: Full Code  HCP: Patients spouse     Disposition: Pt will need HD seat in the community. D/w CM and SW. 33 y.o. female with h/o Migraines and HTN, recently d/c from [facility] on  9/4/18 with dx of acute on chronic renal failure had bx performed which resulted as FSGS; pt currently presented with worsening peripheral edema/ fluid overload and was noted to have worsening kidney dysfunction with metabolic acidosis and hyperkalemia. Pt was admitted with fluid overload and uremia requiring urgent HD. Pt had PC placed and was dialyzed. Nephrology continues to follow the patient. Vein mapping completed. Vascular consulted for AVF. Potential fistula to be placed this week inpatient. In the interim pt awaiting HD seat in the community.       ESRD now on dialysis   ·	Patient had Kidney bx during last recent admission that shows sclerotic changes concerning of collapsing FSGS in setting of mildly elevated dsDNA antibody and decreased C3  ·	S/P CVC access 09/20/2018  ·	Discussed at length with the pt that she will need long term dialysis. Explained the importance of placing an A/V fistula to decrease chance of infection with PC. Patient understands and is agreeable to getting AVF placed.  ·	S/p HD 09/20/2018,  09/21, 9/22, next session planned planned for today   ·	c/w calcitriol 0.5 MICROGram(s) Oral daily and ergocalciferol 50835 Unit(s) Oral every week  ·	Pt known to have ppd positive, quantiferon testing and cxr negative   -hepatitis panel negative   ·	Vein mapping completed   ·	Nephro eval noted and appreciated  ·	Vascular surgery eval noted and appreciated, pt will need AVF placed prior to discharge in order to obtain HD seat in the community. AVF to be placed this week.     Anemia of chronic kidney disease  ·	stable  ·	baseline Hgb 9-10, w/o change from last admission.  ·	hx of transfusion of prbc during last admission due to post renal bx hemorrhage     Hypertension  ·	Stable  ·	goal BP <140/90, controlled  ·	c/w carvedilol 25 milliGRAM(s) Oral every 12 hours    Migraines  ·	stable, Asymptomatic  ·	Pain controlled as above.     Moderate protein calorie malnutrition   ·	c/w nephro po bid   ·	nutrition consult noted and appreciated     Prophylactic measures  ·	heparin  Injectable 5000 Unit(s) SubCutaneous every 12 hours    Advanced Directives: Full Code  HCP: Patients spouse     Disposition: Pt will need HD seat in the community. D/w JACKSON and RENNY.

## 2018-09-25 NOTE — PROGRESS NOTE ADULT - SUBJECTIVE AND OBJECTIVE BOX
CC: Patient is a 33y old  Female who presents with a chief complaint of edema of lower extremities      Interval events  Patient examined at bedside.  As per nurse, no acute events over night.   Patient is comfortable and feels at her baseline.  Patient is eating w/o nausea, vomiting. Pt understands the need for permanent dialysis.  Patient is ambulating. Patient is voiding and has had a BM.    ROS: Denies fever, chest pain, SOB, abdominal pain, diarrhea, constipation, calf pain.      Vital Signs Last 24 Hrs    Vital Signs Last 24 Hrs  T(C): 36.7 (25 Sep 2018 13:16), Max: 37.1 (24 Sep 2018 16:12)  T(F): 98.1 (25 Sep 2018 13:16), Max: 98.7 (24 Sep 2018 16:12)  HR: 95 (25 Sep 2018 13:16) (72 - 95)  BP: 126/68 (25 Sep 2018 13:16) (105/62 - 126/68)  RR: 16 (25 Sep 2018 13:16) (16 - 18)  SpO2: 99% (25 Sep 2018 09:09) (99% - 99%)    Physical Exam:   GENERAL: NAD, well-groomed, well-developed. Cooperative.   HEAD:  Atraumatic, Normocephalic  EYES: EOMI, PERRLA, conjunctiva and sclera clear.    NECK: Supple, No JVD  NEURO: Alert & Oriented X3, Good concentration; Motor Strength 5/5 B/L upper and lower extremities;   RESP: Rt sided neck CVC access, Clear to auscultation bilaterally; No rales, rhonchi, wheezing, or rubs  CVS: Regular rate and rhythm; No murmurs, rubs, or gallops. Capillary refill <2s  GI: Soft, Nontender, Nondistended; Bowel sounds present  EXTREMITIES:  2+ Peripheral Pulses, No clubbing, cyanosis, or edema.  SKIN: No rashes or lesions.        LABS                                   9.8    4.0   )-----------( 192      ( 25 Sep 2018 11:46 )             31.4       09-25    139  |  105  |  57.0<H>  ----------------------------<  90  4.9   |  21.0<L>  |  7.46<H>    Ca    10.0      25 Sep 2018 08:20  Phos  4.7     09-25  Mg     2.5     09-25        Radiology  _______________________________________________________________    US Vessel Mapping Hemodialysis (09.19.18 @ 17:48)   IMPRESSION:  NO EVIDENCE OF DEEP VEIN THROMBOSIS.  CEPHALIC AND BASILIC MEASUREMENTS AS DESCRIBED      MEDICATIONS  (STANDING):  ascorbic acid 250 milliGRAM(s) Oral daily  calcitriol   Capsule 0.5 MICROGram(s) Oral daily  carvedilol 25 milliGRAM(s) Oral every 12 hours  docusate sodium 100 milliGRAM(s) Oral two times a day  epoetin brianda Injectable 6000 Unit(s) IV Push <User Schedule>  ergocalciferol 00923 Unit(s) Oral every week  heparin  Injectable 5000 Unit(s) SubCutaneous every 12 hours  influenza   Vaccine 0.5 milliLiter(s) IntraMuscular once  sevelamer hydrochloride 800 milliGRAM(s) Oral three times a day    MEDICATIONS  (PRN):  acetaminophen   Tablet .. 650 milliGRAM(s) Oral every 6 hours PRN Mild Pain (1 - 3), Moderate Pain (4 - 6)  ondansetron    Tablet 4 milliGRAM(s) Oral every 8 hours PRN Nausea and/or Vomiting CC: Patient is a 33y old  Female who presents with a chief complaint of edema of lower extremities      Interval events  Patient examined at bedside.  As per nurse, no acute events over night.   Patient is comfortable and feels at her baseline.  Patient is eating w/o nausea, vomiting. Pt understands the need for permanent dialysis. She was tearful and upset while on the dialysis machine, she wishes she could be taken off b/c she has two young children and feels like this is going to drastically change her quality of life. Emotional support provided and pt does understand the need for further tx and AVF placement. Patient is ambulating. Patient is voiding and has had a BM.    ROS: Denies fever, chest pain, SOB, abdominal pain, diarrhea, constipation, calf pain.      Vital Signs Last 24 Hrs    Vital Signs Last 24 Hrs  T(C): 36.7 (25 Sep 2018 13:16), Max: 37.1 (24 Sep 2018 16:12)  T(F): 98.1 (25 Sep 2018 13:16), Max: 98.7 (24 Sep 2018 16:12)  HR: 95 (25 Sep 2018 13:16) (72 - 95)  BP: 126/68 (25 Sep 2018 13:16) (105/62 - 126/68)  RR: 16 (25 Sep 2018 13:16) (16 - 18)  SpO2: 99% (25 Sep 2018 09:09) (99% - 99%)    Physical Exam:   GENERAL: NAD, well-groomed, well-developed. Cooperative.   HEAD:  Atraumatic, Normocephalic  EYES: EOMI, PERRLA, conjunctiva and sclera clear.    NECK: Supple, No JVD  NEURO: Alert & Oriented X3, Good concentration; Motor Strength 5/5 B/L upper and lower extremities;   RESP: Rt sided neck CVC access, Clear to auscultation bilaterally; No rales, rhonchi, wheezing, or rubs  CVS: Regular rate and rhythm; No murmurs, rubs, or gallops. Capillary refill <2s  GI: Soft, Nontender, Nondistended; Bowel sounds present  EXTREMITIES:  2+ Peripheral Pulses, No clubbing, cyanosis, or edema.  SKIN: No rashes or lesions.        LABS                                   9.8    4.0   )-----------( 192      ( 25 Sep 2018 11:46 )             31.4       09-25    139  |  105  |  57.0<H>  ----------------------------<  90  4.9   |  21.0<L>  |  7.46<H>    Ca    10.0      25 Sep 2018 08:20  Phos  4.7     09-25  Mg     2.5     09-25        Radiology  _______________________________________________________________    US Vessel Mapping Hemodialysis (09.19.18 @ 17:48)   IMPRESSION:  NO EVIDENCE OF DEEP VEIN THROMBOSIS.  CEPHALIC AND BASILIC MEASUREMENTS AS DESCRIBED      MEDICATIONS  (STANDING):  ascorbic acid 250 milliGRAM(s) Oral daily  calcitriol   Capsule 0.5 MICROGram(s) Oral daily  carvedilol 25 milliGRAM(s) Oral every 12 hours  docusate sodium 100 milliGRAM(s) Oral two times a day  epoetin brianda Injectable 6000 Unit(s) IV Push <User Schedule>  ergocalciferol 89037 Unit(s) Oral every week  heparin  Injectable 5000 Unit(s) SubCutaneous every 12 hours  influenza   Vaccine 0.5 milliLiter(s) IntraMuscular once  sevelamer hydrochloride 800 milliGRAM(s) Oral three times a day    MEDICATIONS  (PRN):  acetaminophen   Tablet .. 650 milliGRAM(s) Oral every 6 hours PRN Mild Pain (1 - 3), Moderate Pain (4 - 6)  ondansetron    Tablet 4 milliGRAM(s) Oral every 8 hours PRN Nausea and/or Vomiting

## 2018-09-25 NOTE — PROGRESS NOTE ADULT - SUBJECTIVE AND OBJECTIVE BOX
HPI: This is a 33 year old female PMHx CKD stage 5, migraines, HTN presented to [facility] complaints of edema and nausea for last 3 days worse in the morning and gradually decreases through the day. This is a palliative care consult.    CC: edema nausea      CC:    Present Symptoms:     Dyspnea: 0 1 2 3   Nausea/Vomiting: Yes No  Anxiety:  Yes No  Depression: Yes No  Fatigue: Yes No  Loss of appetite: Yes No  Constipation: Yes No    Pain:             Character-            Duration-            Effect-            Factors-            Frequency-            Location-            Severity-    Review of Systems: Reviewed                     Negative:                     Positive:  Unable to obtain due to poor mentation   All others negative    MEDICATIONS  (STANDING):  amLODIPine   Tablet 10 milliGRAM(s) Oral daily  ascorbic acid 250 milliGRAM(s) Oral daily  calcitriol   Capsule 0.5 MICROGram(s) Oral daily  carvedilol 25 milliGRAM(s) Oral every 12 hours  docusate sodium 100 milliGRAM(s) Oral two times a day  epoetin brianda Injectable 6000 Unit(s) IV Push <User Schedule>  ergocalciferol 30925 Unit(s) Oral every week  heparin  Injectable 5000 Unit(s) SubCutaneous every 12 hours  influenza   Vaccine 0.5 milliLiter(s) IntraMuscular once  sevelamer hydrochloride 800 milliGRAM(s) Oral three times a day    MEDICATIONS  (PRN):  acetaminophen   Tablet .. 650 milliGRAM(s) Oral every 6 hours PRN Mild Pain (1 - 3), Moderate Pain (4 - 6)  ondansetron    Tablet 4 milliGRAM(s) Oral every 8 hours PRN Nausea and/or Vomiting      PHYSICAL EXAM:    Vital Signs Last 24 Hrs  T(C): 36.9 (25 Sep 2018 05:24), Max: 37.1 (24 Sep 2018 16:12)  T(F): 98.5 (25 Sep 2018 05:24), Max: 98.7 (24 Sep 2018 16:12)  HR: 77 (25 Sep 2018 05:24) (72 - 80)  BP: 105/62 (25 Sep 2018 05:24) (105/62 - 121/87)  BP(mean): --  RR: 18 (25 Sep 2018 05:24) (18 - 18)  SpO2: 99% (25 Sep 2018 05:24) (97% - 99%)    General: alert  oriented x ____ lethargic agitated                  cachexia  nonverbal  coma    Karnofsky:  %    HEENT: normal  dry mouth  ET tube/trach    Lungs: comfortable tachypnea/labored breathing  excessive secretions    CV: normal  tachycardia    GI: normal  distended  tender  no BS               PEG/NG/OG tube  constipation  last BM:     : normal  incontinent  oliguria/anuria  hensley    MSK: normal  weakness  edema             ambulatory  bedbound/wheelchair bound    Skin: normal  pressure ulcers- Stage_____  no rash    LABS:      09-25    139  |  105  |  57.0<H>  ----------------------------<  90  4.9   |  21.0<L>  |  7.46<H>    Ca    10.0      25 Sep 2018 08:20  Phos  4.7     09-25  Mg     2.5     09-25          I&O's Summary    24 Sep 2018 07:01  -  25 Sep 2018 07:00  --------------------------------------------------------  IN: 360 mL / OUT: 0 mL / NET: 360 mL        RADIOLOGY & ADDITIONAL STUDIES:    ADVANCE DIRECTIVES:   DNR YES NO  Completed on:                     MOLST  YES NO   Completed on:  Living Will  YES NO   Completed on: HPI: This is a 33 year old female PMHx CKD stage 5, migraines, HTN presented to [facility] complaints of edema and nausea for last 3 days worse in the morning and gradually decreases through the day. This is a palliative care follow up.    CC: edema nausea      CC:    Present Symptoms:     Dyspnea: 0 1 2 3   Nausea/Vomiting: Yes No  Anxiety:  Yes No  Depression: Yes No  Fatigue: Yes No  Loss of appetite: Yes No  Constipation: Yes No    Pain:             Character-            Duration-            Effect-            Factors-            Frequency-            Location-            Severity-    Review of Systems: Reviewed                     Negative:                     Positive:  Unable to obtain due to poor mentation   All others negative    MEDICATIONS  (STANDING):  amLODIPine   Tablet 10 milliGRAM(s) Oral daily  ascorbic acid 250 milliGRAM(s) Oral daily  calcitriol   Capsule 0.5 MICROGram(s) Oral daily  carvedilol 25 milliGRAM(s) Oral every 12 hours  docusate sodium 100 milliGRAM(s) Oral two times a day  epoetin brianda Injectable 6000 Unit(s) IV Push <User Schedule>  ergocalciferol 84235 Unit(s) Oral every week  heparin  Injectable 5000 Unit(s) SubCutaneous every 12 hours  influenza   Vaccine 0.5 milliLiter(s) IntraMuscular once  sevelamer hydrochloride 800 milliGRAM(s) Oral three times a day    MEDICATIONS  (PRN):  acetaminophen   Tablet .. 650 milliGRAM(s) Oral every 6 hours PRN Mild Pain (1 - 3), Moderate Pain (4 - 6)  ondansetron    Tablet 4 milliGRAM(s) Oral every 8 hours PRN Nausea and/or Vomiting      PHYSICAL EXAM:    Vital Signs Last 24 Hrs  T(C): 36.9 (25 Sep 2018 05:24), Max: 37.1 (24 Sep 2018 16:12)  T(F): 98.5 (25 Sep 2018 05:24), Max: 98.7 (24 Sep 2018 16:12)  HR: 77 (25 Sep 2018 05:24) (72 - 80)  BP: 105/62 (25 Sep 2018 05:24) (105/62 - 121/87)  BP(mean): --  RR: 18 (25 Sep 2018 05:24) (18 - 18)  SpO2: 99% (25 Sep 2018 05:24) (97% - 99%)    General: alert  oriented x ____ lethargic agitated                  cachexia  nonverbal  coma    Karnofsky:  %    HEENT: normal  dry mouth  ET tube/trach    Lungs: comfortable tachypnea/labored breathing  excessive secretions    CV: normal  tachycardia    GI: normal  distended  tender  no BS               PEG/NG/OG tube  constipation  last BM:     : normal  incontinent  oliguria/anuria  hensley    MSK: normal  weakness  edema             ambulatory  bedbound/wheelchair bound    Skin: normal  pressure ulcers- Stage_____  no rash    LABS:      09-25    139  |  105  |  57.0<H>  ----------------------------<  90  4.9   |  21.0<L>  |  7.46<H>    Ca    10.0      25 Sep 2018 08:20  Phos  4.7     09-25  Mg     2.5     09-25          I&O's Summary    24 Sep 2018 07:01  -  25 Sep 2018 07:00  --------------------------------------------------------  IN: 360 mL / OUT: 0 mL / NET: 360 mL        RADIOLOGY & ADDITIONAL STUDIES:    ADVANCE DIRECTIVES:   DNR YES NO  Completed on:                     MOLST  YES NO   Completed on:  Living Will  YES NO   Completed on: HPI: This is a 33 year old female PMHx CKD stage 5, migraines, HTN presented to [facility] complaints of edema and nausea for last 3 days worse in the morning and gradually decreases through the day. This is a palliative care follow up.    CC: edema nausea    Present Symptoms:   Dyspnea: 0   Nausea/Vomiting: No  Anxiety:  No  Depression: No  Fatigue: No  Loss of appetite: No  Constipation: No    Pain: Denies pain            Character-            Duration-            Effect-            Factors-            Frequency-            Location-            Severity-    Review of Systems: Reviewed                     Negative: Denies pain  All others negative    MEDICATIONS  (STANDING):  amLODIPine   Tablet 10 milliGRAM(s) Oral daily  ascorbic acid 250 milliGRAM(s) Oral daily  calcitriol   Capsule 0.5 MICROGram(s) Oral daily  carvedilol 25 milliGRAM(s) Oral every 12 hours  docusate sodium 100 milliGRAM(s) Oral two times a day  epoetin brianda Injectable 6000 Unit(s) IV Push <User Schedule>  ergocalciferol 90214 Unit(s) Oral every week  heparin  Injectable 5000 Unit(s) SubCutaneous every 12 hours  influenza   Vaccine 0.5 milliLiter(s) IntraMuscular once  sevelamer hydrochloride 800 milliGRAM(s) Oral three times a day    MEDICATIONS  (PRN):  acetaminophen   Tablet .. 650 milliGRAM(s) Oral every 6 hours PRN Mild Pain (1 - 3), Moderate Pain (4 - 6)  ondansetron    Tablet 4 milliGRAM(s) Oral every 8 hours PRN Nausea and/or Vomiting      PHYSICAL EXAM:  Vital Signs Last 24 Hrs  T(C): 36.9 (25 Sep 2018 05:24), Max: 37.1 (24 Sep 2018 16:12)  T(F): 98.5 (25 Sep 2018 05:24), Max: 98.7 (24 Sep 2018 16:12)  HR: 77 (25 Sep 2018 05:24) (72 - 80)  BP: 105/62 (25 Sep 2018 05:24) (105/62 - 121/87)  BP(mean): --  RR: 18 (25 Sep 2018 05:24) (18 - 18)  SpO2: 99% (25 Sep 2018 05:24) (97% - 99%)    General: alert  oriented x __4__     HEENT: normal      Lungs: comfortable     CV: normal      GI: normal      : HD patient     MSK: normal     Skin: normal    LABS:      09-25    139  |  105  |  57.0<H>  ----------------------------<  90  4.9   |  21.0<L>  |  7.46<H>    Ca    10.0      25 Sep 2018 08:20  Phos  4.7     09-25  Mg     2.5     09-25          I&O's Summary    24 Sep 2018 07:01  -  25 Sep 2018 07:00  --------------------------------------------------------  IN: 360 mL / OUT: 0 mL / NET: 360 mL        RADIOLOGY & ADDITIONAL STUDIES:    ADVANCE DIRECTIVES:   FULL CODE

## 2018-09-25 NOTE — PROGRESS NOTE ADULT - PROBLEM SELECTOR PLAN 4
note in progress -Met with patient at bedside who was open to conversation, in good spirits, and receiving HD during the encounter. (Dialysis RN at bedside)  -Patient is anticipating discharge, she is understanding of the need of AV fistula to be placed for outpatient treatment of dialysis.  -Patient denies pain, shortness of breath, chest pain, palpitations, nausea, vomiting, or fever/chills.  -HCP established yesterday, patient wishes to remain FULL CODE all medical interventions to be taken if necessary  At this time will sign off, if needs change or GOC change please feel free to reconsult with our team. Thank you    Total time spent 20 minutes      Thank you for the opportunity to assist with the care of this patient.   [facility] [phone].

## 2018-09-25 NOTE — PROGRESS NOTE ADULT - ASSESSMENT
31 year old female in ESRD stage 5 with elevated BUN CRT receiving HD at [facility] now via perma cath will need AV fistula for long term HD 31 year old female in ESRD stage 5 with elevated BUN CRT receiving HD at [facility] now via perma cath will need AV fistula for long term HD

## 2018-09-25 NOTE — PROGRESS NOTE ADULT - SUBJECTIVE AND OBJECTIVE BOX
139    |  105    |  57.0<H>  ----------------------------<  90     Ca:10.0  (25 Sep 2018 08:20)  4.9     |  21.0<L>  |  7.46<H>      eGFR if Non : 6 <L>  eGFR if : 8 <L>                              9.8<L>  4.0<L> )-----------( 192      ( 25 Sep 2018 11:46 )             31.4<L>    Phos: 4.7 mg/dL M.5 mg/dL PTH:-- Uric acid:-- Serum Osm:--  Ferritin:-- Iron:-- TIBC:-- Tsat:--  B12:-- TSH:-- ( @ 08:20)      UProt:-- UCr:19 mg/dL P/C Ratio:7.5 Ratio<H> 24 hour Prot:-- UVol:-- CrCl:--  Eliana:-- UOsm:-- UVol:-- UCl:-- UK:-- ( @ 02:26)    Patient was seen and evaluated on dialysis.   Patient is tolerating the procedure well.   T(C): 36.9 (18 @ 09:09), Max: 37.1 (18 @ 16:12)  HR: 76 (18 @ 09:09) (72 - 77)  BP: 116/54 (18 @ 09:09) (105/62 - 121/87)  Continue dialysis:  T   Dialyzer:          QB:        QD: 500ml.,  Goal UF __ over __ Hours     @ TW , BP Normal,    Post HD Muscle cramps,    Will D.C Amlodipine,

## 2018-09-26 LAB
ALBUMIN SERPL ELPH-MCNC: 3.8 G/DL — SIGNIFICANT CHANGE UP (ref 3.3–5.2)
ALP SERPL-CCNC: 79 U/L — SIGNIFICANT CHANGE UP (ref 40–120)
ALT FLD-CCNC: 35 U/L — HIGH
ANION GAP SERPL CALC-SCNC: 14 MMOL/L — SIGNIFICANT CHANGE UP (ref 5–17)
ANISOCYTOSIS BLD QL: SLIGHT — SIGNIFICANT CHANGE UP
AST SERPL-CCNC: 20 U/L — SIGNIFICANT CHANGE UP
BASOPHILS # BLD AUTO: 0 K/UL — SIGNIFICANT CHANGE UP (ref 0–0.2)
BASOPHILS NFR BLD AUTO: 0.2 % — SIGNIFICANT CHANGE UP (ref 0–2)
BILIRUB SERPL-MCNC: 0.3 MG/DL — LOW (ref 0.4–2)
BUN SERPL-MCNC: 41 MG/DL — HIGH (ref 8–20)
CALCIUM SERPL-MCNC: 9.6 MG/DL — SIGNIFICANT CHANGE UP (ref 8.6–10.2)
CHLORIDE SERPL-SCNC: 100 MMOL/L — SIGNIFICANT CHANGE UP (ref 98–107)
CO2 SERPL-SCNC: 23 MMOL/L — SIGNIFICANT CHANGE UP (ref 22–29)
CREAT SERPL-MCNC: 5.16 MG/DL — HIGH (ref 0.5–1.3)
ELLIPTOCYTES BLD QL SMEAR: SLIGHT — SIGNIFICANT CHANGE UP
EOSINOPHIL # BLD AUTO: 0.1 K/UL — SIGNIFICANT CHANGE UP (ref 0–0.5)
EOSINOPHIL NFR BLD AUTO: 1.6 % — SIGNIFICANT CHANGE UP (ref 0–5)
GAMMA INTERFERON BACKGROUND BLD IA-ACNC: 0.04 IU/ML — SIGNIFICANT CHANGE UP
GLUCOSE SERPL-MCNC: 88 MG/DL — SIGNIFICANT CHANGE UP (ref 70–115)
HCT VFR BLD CALC: 31.4 % — LOW (ref 37–47)
HGB BLD-MCNC: 9.6 G/DL — LOW (ref 12–16)
LYMPHOCYTES # BLD AUTO: 1.2 K/UL — SIGNIFICANT CHANGE UP (ref 1–4.8)
LYMPHOCYTES # BLD AUTO: 27.7 % — SIGNIFICANT CHANGE UP (ref 20–55)
M TB IFN-G BLD-IMP: NEGATIVE — SIGNIFICANT CHANGE UP
M TB IFN-G CD4+ BCKGRND COR BLD-ACNC: 0.01 IU/ML — SIGNIFICANT CHANGE UP
M TB IFN-G CD4+CD8+ BCKGRND COR BLD-ACNC: 0.01 IU/ML — SIGNIFICANT CHANGE UP
MAGNESIUM SERPL-MCNC: 2.3 MG/DL — SIGNIFICANT CHANGE UP (ref 1.6–2.6)
MCHC RBC-ENTMCNC: 26.4 PG — LOW (ref 27–31)
MCHC RBC-ENTMCNC: 30.6 G/DL — LOW (ref 32–36)
MCV RBC AUTO: 86.5 FL — SIGNIFICANT CHANGE UP (ref 81–99)
MONOCYTES # BLD AUTO: 0.8 K/UL — SIGNIFICANT CHANGE UP (ref 0–0.8)
MONOCYTES NFR BLD AUTO: 18.9 % — HIGH (ref 3–10)
NEUTROPHILS # BLD AUTO: 2.2 K/UL — SIGNIFICANT CHANGE UP (ref 1.8–8)
NEUTROPHILS NFR BLD AUTO: 50.9 % — SIGNIFICANT CHANGE UP (ref 37–73)
OVALOCYTES BLD QL SMEAR: SLIGHT — SIGNIFICANT CHANGE UP
PHOSPHATE SERPL-MCNC: 4.1 MG/DL — SIGNIFICANT CHANGE UP (ref 2.4–4.7)
PLAT MORPH BLD: NORMAL — SIGNIFICANT CHANGE UP
PLATELET # BLD AUTO: 175 K/UL — SIGNIFICANT CHANGE UP (ref 150–400)
POIKILOCYTOSIS BLD QL AUTO: SLIGHT — SIGNIFICANT CHANGE UP
POTASSIUM SERPL-MCNC: 4.5 MMOL/L — SIGNIFICANT CHANGE UP (ref 3.5–5.3)
POTASSIUM SERPL-SCNC: 4.5 MMOL/L — SIGNIFICANT CHANGE UP (ref 3.5–5.3)
PROT SERPL-MCNC: 7 G/DL — SIGNIFICANT CHANGE UP (ref 6.6–8.7)
QUANT TB PLUS MITOGEN MINUS NIL: 7.56 IU/ML — SIGNIFICANT CHANGE UP
RBC # BLD: 3.63 M/UL — LOW (ref 4.4–5.2)
RBC # FLD: 14.4 % — SIGNIFICANT CHANGE UP (ref 11–15.6)
RBC BLD AUTO: ABNORMAL
SODIUM SERPL-SCNC: 137 MMOL/L — SIGNIFICANT CHANGE UP (ref 135–145)
WBC # BLD: 4.4 K/UL — LOW (ref 4.8–10.8)
WBC # FLD AUTO: 4.4 K/UL — LOW (ref 4.8–10.8)

## 2018-09-26 PROCEDURE — 99232 SBSQ HOSP IP/OBS MODERATE 35: CPT | Mod: GC

## 2018-09-26 PROCEDURE — 99233 SBSQ HOSP IP/OBS HIGH 50: CPT

## 2018-09-26 RX ADMIN — HEPARIN SODIUM 5000 UNIT(S): 5000 INJECTION INTRAVENOUS; SUBCUTANEOUS at 17:21

## 2018-09-26 RX ADMIN — HEPARIN SODIUM 5000 UNIT(S): 5000 INJECTION INTRAVENOUS; SUBCUTANEOUS at 06:09

## 2018-09-26 RX ADMIN — SEVELAMER CARBONATE 800 MILLIGRAM(S): 2400 POWDER, FOR SUSPENSION ORAL at 12:32

## 2018-09-26 RX ADMIN — SEVELAMER CARBONATE 800 MILLIGRAM(S): 2400 POWDER, FOR SUSPENSION ORAL at 21:40

## 2018-09-26 RX ADMIN — SEVELAMER CARBONATE 800 MILLIGRAM(S): 2400 POWDER, FOR SUSPENSION ORAL at 06:07

## 2018-09-26 RX ADMIN — CARVEDILOL PHOSPHATE 25 MILLIGRAM(S): 80 CAPSULE, EXTENDED RELEASE ORAL at 06:08

## 2018-09-26 RX ADMIN — CALCITRIOL 0.5 MICROGRAM(S): 0.5 CAPSULE ORAL at 12:33

## 2018-09-26 RX ADMIN — Medication 100 MILLIGRAM(S): at 06:07

## 2018-09-26 RX ADMIN — Medication 250 MILLIGRAM(S): at 12:32

## 2018-09-26 RX ADMIN — Medication 100 MILLIGRAM(S): at 17:21

## 2018-09-26 RX ADMIN — CARVEDILOL PHOSPHATE 25 MILLIGRAM(S): 80 CAPSULE, EXTENDED RELEASE ORAL at 17:24

## 2018-09-26 RX ADMIN — ONDANSETRON 4 MILLIGRAM(S): 8 TABLET, FILM COATED ORAL at 17:21

## 2018-09-26 NOTE — PROGRESS NOTE ADULT - ASSESSMENT
33 y.o. female with h/o Migraines and HTN, recently d/c from [facility] on  9/4/18 with dx of acute on chronic renal failure had bx performed which resulted as FSGS; pt currently presented with worsening peripheral edema/ fluid overload and was noted to have worsening kidney dysfunction with metabolic acidosis and hyperkalemia. Pt was admitted with fluid overload and uremia requiring urgent HD. Pt had PC placed and was dialyzed. Nephrology continues to follow the patient. Vein mapping completed. Vascular consulted for AVF. Potential fistula to be placed this week inpatient. In the interim pt awaiting HD seat in the community.       ESRD now on dialysis   ·	Patient had Kidney bx during last recent admission that shows sclerotic changes concerning of collapsing FSGS in setting of mildly elevated dsDNA antibody and decreased C3  ·	S/P CVC access 09/20/2018  ·	Discussed at length with the pt that she will need long term dialysis. Explained the importance of placing an A/V fistula to decrease chance of infection with PC. Patient understands and is agreeable to getting AVF placed.  ·	S/p HD 09/20/2018,  09/21, 9/22, next session planned planned for today   ·	c/w calcitriol 0.5 MICROGram(s) Oral daily and ergocalciferol 34312 Unit(s) Oral every week  ·	Pt known to have ppd positive, quantiferon testing and cxr negative   -hepatitis panel negative   ·	Vein mapping completed   ·	Nephro eval noted and appreciated  ·	Vascular surgery eval noted and appreciated, pt will need AVF placed prior to discharge in order to obtain HD seat in the community. AVF to be placed this week.     Anemia of chronic kidney disease  ·	stable  ·	baseline Hgb 9-10, w/o change from last admission.  ·	hx of transfusion of prbc during last admission due to post renal bx hemorrhage     Hypertension  ·	Stable  ·	goal BP <140/90, controlled  ·	c/w carvedilol 25 milliGRAM(s) Oral every 12 hours    Migraines  ·	stable, Asymptomatic  ·	Pain controlled as above.     Moderate protein calorie malnutrition   ·	c/w nephro po bid   ·	nutrition consult noted and appreciated     Prophylactic measures  ·	heparin  Injectable 5000 Unit(s) SubCutaneous every 12 hours    Advanced Directives: Full Code  HCP: Patients spouse     Disposition: Pt will need HD seat in the community. D/w JACKSON and RENNY. 33 y.o. female with h/o migraines and HTN, recently d/c from [facility] on 9/4/18 with dx of acute on chronic renal failure had bx performed which resulted as FSGS; pt currently presented with worsening peripheral edema/ fluid overload and was noted to have worsening kidney dysfunction with metabolic acidosis and hyperkalemia. Pt was admitted with fluid overload and uremia requiring urgent HD. Pt had PC placed and was dialyzed. Nephrology continues to follow the patient. Vein mapping completed. Vascular consulted for AVF. Potential fistula to be placed this week inpatient 9/28/18.      ESRD now on dialysis   ·	Patient had Kidney bx during last recent admission that shows sclerotic changes concerning of collapsing FSGS in setting of mildly elevated dsDNA antibody and decreased C3  ·	stable labs   ·	S/P CVC access 09/20/2018  ·	c/w calcitriol 0.5 MICROGram(s) Oral daily and ergocalciferol 61739 Unit(s) Oral every week  ·	Pt known to have ppd positive, quantiferon negative and cxr negative   -hepatitis panel negative   ·	Vein mapping completed   ·	Pt to be NPO on thursday night   - AVF to be placed on 9/28/18   ·	Nephro f/u noted and appreciated  ·	Vascular surgery f/u noted and appreciated    Anemia of chronic kidney disease  ·	stable  ·	baseline Hgb 9-10, w/o change from last admission.  ·	hx of transfusion of prbc during last admission due to post renal bx hemorrhage     Hypertension  ·	Stable  ·	goal BP <140/90, controlled  ·	c/w carvedilol 25 milliGRAM(s) Oral every 12 hours    Migraines  ·	stable, Asymptomatic  ·	Pain controlled as above.     Moderate protein calorie malnutrition   ·	c/w nephro po bid   ·	nutrition consult noted and appreciated     Prophylactic measures  ·	heparin  Injectable 5000 Unit(s) Subcutaneous every 12 hours    Advanced Directives: Full Code  HCP: Patients spouse     Disposition:  D/w CM and SW pt will have a seat in the community for HD on 10/2/18, plan is for the patient to be dialyzed on 9/29/18 33 y.o. female with h/o migraines and HTN, recently d/c from [facility] on 9/4/18 with dx of acute on chronic renal failure had bx performed which resulted as FSGS; pt currently presented with worsening peripheral edema/ fluid overload and was noted to have worsening kidney dysfunction with metabolic acidosis and hyperkalemia. Pt was admitted with fluid overload and uremia requiring urgent HD. Pt had PC placed and was dialyzed. Nephrology continues to follow the patient. Vein mapping completed. Vascular consulted for AVF. Potential fistula to be placed this week inpatient 9/28/18.      ESRD now on dialysis   ·	Patient had Kidney bx during last recent admission that shows sclerotic changes concerning of collapsing FSGS in setting of mildly elevated dsDNA antibody and decreased C3  ·	stable labs   ·	S/P CVC access 09/20/2018  ·	c/w calcitriol 0.5 MICROGram(s) Oral daily and ergocalciferol 56303 Unit(s) Oral every week  ·	Pt known to have ppd positive, quantiferon negative and cxr negative   -hepatitis panel negative   ·	Vein mapping completed   ·	Pt to be NPO on thursday night   - AVF to be placed on 9/28/18   ·	-c/w regularly scheduled HD   ·	Nephro f/u noted and appreciated  ·	Vascular surgery f/u noted and appreciated    Anemia of chronic kidney disease  ·	stable  ·	baseline Hgb 9-10, w/o change from last admission.  ·	hx of transfusion of prbc during last admission due to post renal bx hemorrhage     Hypertension  ·	Stable  ·	goal BP <140/90, controlled  ·	c/w carvedilol 25 milliGRAM(s) Oral every 12 hours    Migraines  ·	stable, Asymptomatic  ·	Pain controlled as above.     Moderate protein calorie malnutrition   ·	c/w nephro po bid   ·	nutrition consult noted and appreciated     Prophylactic measures  ·	heparin  Injectable 5000 Unit(s) Subcutaneous every 12 hours    Advanced Directives: Full Code  HCP: Patients spouse     Disposition:  D/w CM and SW pt will have a seat in the community for HD on 10/2/18, plan is for the patient to be dialyzed T TH sat, so pt will need HD on 9/29/18 prior to being discharged so pt can c/w HD as an outpt to be started at above date.

## 2018-09-26 NOTE — PROGRESS NOTE ADULT - SUBJECTIVE AND OBJECTIVE BOX
CC: Patient is a 33y old  Female who presents with a chief complaint of edema of lower extremities      Interval events  Patient examined at bedside.  As per nurse, no acute events over night.   Patient is comfortable and feels at her baseline.  Patient is eating w/o nausea, vomiting. Pt understands the need for permanent dialysis. Pt was more hopeful to get better and want to get AVF placed so she can get back to her daily routine and taking care of her kids. Patient is ambulating. Patient is voiding. Pt has not had bowel movement since yesterday.    ROS: Denies fever, chest pain, SOB, abdominal pain, diarrhea, constipation, calf pain.      Vital Signs Last 24 Hrs  Vital Signs Last 24 Hrs  T(C): 36.6 (26 Sep 2018 05:00), Max: 37.8 (25 Sep 2018 15:21)  T(F): 97.8 (26 Sep 2018 05:00), Max: 100 (25 Sep 2018 15:21)  HR: 78 (26 Sep 2018 05:00) (76 - 96)  BP: 123/81 (26 Sep 2018 05:00) (112/75 - 128/74)  RR: 18 (26 Sep 2018 05:00) (16 - 18)  SpO2: 97% (25 Sep 2018 22:00) (97% - 99%)    Physical Exam:   GENERAL: NAD, well-groomed, well-developed. Cooperative.   HEAD:  Atraumatic, Normocephalic  EYES: EOMI, PERRLA, conjunctiva and sclera clear.    NECK: Supple, No JVD  NEURO: Alert & Oriented X3, Good concentration; Motor Strength 5/5 B/L upper and lower extremities;   RESP: Rt sided neck CVC access, Clear to auscultation bilaterally; No rales, rhonchi, wheezing, or rubs  CVS: Regular rate and rhythm; No murmurs, rubs, or gallops. Capillary refill <2s  GI: Soft, Nontender, Nondistended; Bowel sounds present  EXTREMITIES:  2+ Peripheral Pulses, No clubbing, cyanosis, or edema.        LABS                                                 9.8    4.0   )-----------( 192      ( 25 Sep 2018 11:46 )             31.4   09-25    139  |  105  |  57.0<H>  ----------------------------<  90  4.9   |  21.0<L>  |  7.46<H>    Ca    10.0      25 Sep 2018 08:20  Phos  4.7     09-25  Mg     2.5     09-25            Radiology  _______________________________________________________________    US Vessel Mapping Hemodialysis (09.19.18 @ 17:48)   IMPRESSION:  NO EVIDENCE OF DEEP VEIN THROMBOSIS.  CEPHALIC AND BASILIC MEASUREMENTS AS DESCRIBED      MEDICATIONS  (STANDING):  ascorbic acid 250 milliGRAM(s) Oral daily  calcitriol   Capsule 0.5 MICROGram(s) Oral daily  carvedilol 25 milliGRAM(s) Oral every 12 hours  docusate sodium 100 milliGRAM(s) Oral two times a day  epoetin brianda Injectable 6000 Unit(s) IV Push <User Schedule>  ergocalciferol 13976 Unit(s) Oral every week  heparin  Injectable 5000 Unit(s) SubCutaneous every 12 hours  influenza   Vaccine 0.5 milliLiter(s) IntraMuscular once  sevelamer hydrochloride 800 milliGRAM(s) Oral three times a day    MEDICATIONS  (PRN):  acetaminophen   Tablet .. 650 milliGRAM(s) Oral every 6 hours PRN Mild Pain (1 - 3), Moderate Pain (4 - 6)  ondansetron    Tablet 4 milliGRAM(s) Oral every 8 hours PRN Nausea and/or Vomiting

## 2018-09-26 NOTE — PROGRESS NOTE ADULT - ASSESSMENT
1. ESRD on HD  2. Anemia of Chronic Disease  3. HTN  4. 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  Continue epogen, ascorbic acid  Oral Tab/Cap - Peds 250 milliGRAM(s) Oral daily  goal BP <140/90, controlled  Continue carvedilol 25 milliGRAM(s) Oral every 12 hours, amlodipine 10 mg Oral qd  Migraine stable, asymptomatic

## 2018-09-26 NOTE — CHART NOTE - NSCHARTNOTEFT_GEN_A_CORE
Source: Patient [ ]  Family [ ]   other [x ]  EMR  Current Diet: dash, renal replacement with Nepro BID    Patient reports [ ] nausea  [ ] vomiting [ ] diarrhea [ ] constipation  [ ]chewing problems [ ] swallowing issues  [ ] other:     PO intake:  < 50% [ ]   50-75%  [x ]   %  [ ]  other :    Source for PO intake [ ] Patient [ ] family [x ] chart [ ] staff [ ] other    Enteral /Parenteral Nutrition:     Current Weight: 9/20 43.5kg, 9/26 44.5kg    % Weight Change     Pertinent Medications: MEDICATIONS  (STANDING):  ascorbic acid 250 milliGRAM(s) Oral daily  calcitriol   Capsule 0.5 MICROGram(s) Oral daily  carvedilol 25 milliGRAM(s) Oral every 12 hours  docusate sodium 100 milliGRAM(s) Oral two times a day  epoetin brianda Injectable 6000 Unit(s) IV Push <User Schedule>  ergocalciferol 23217 Unit(s) Oral every week  heparin  Injectable 5000 Unit(s) SubCutaneous every 12 hours  influenza   Vaccine 0.5 milliLiter(s) IntraMuscular once  sevelamer hydrochloride 800 milliGRAM(s) Oral three times a day    MEDICATIONS  (PRN):  acetaminophen   Tablet .. 650 milliGRAM(s) Oral every 6 hours PRN Mild Pain (1 - 3), Moderate Pain (4 - 6)  ondansetron    Tablet 4 milliGRAM(s) Oral every 8 hours PRN Nausea and/or Vomiting    Pertinent Labs: CBC Full  -  ( 26 Sep 2018 08:48 )  WBC Count : 4.4 K/uL  Hemoglobin : 9.6 g/dL  Hematocrit : 31.4 %  Platelet Count - Automated : 175 K/uL  Mean Cell Volume : 86.5 fl  Mean Cell Hemoglobin : 26.4 pg  Mean Cell Hemoglobin Concentration : 30.6 g/dL  Auto Neutrophil # : 2.2 K/uL  Auto Lymphocyte # : 1.2 K/uL  Auto Monocyte # : 0.8 K/uL  Auto Eosinophil # : 0.1 K/uL  Auto Basophil # : 0.0 K/uL  Auto Neutrophil % : 50.9 %  Auto Lymphocyte % : 27.7 %  Auto Monocyte % : 18.9 %  Auto Eosinophil % : 1.6 %  Auto Basophil % : 0.2 %      09-26 Na137 mmol/L Glu 88 mg/dL K+ 4.5 mmol/L Cr  5.16 mg/dL<H> BUN 41.0 mg/dL<H> Phos 4.1 mg/dL Alb 3.8 g/dL PAB n/a           Skin:     Nutrition focused physical exam previously conducted - found signs of malnutrition [ ]absent [x ]present    Subcutaneous fat loss: [ ] Orbital fat pads region, [ ]Buccal fat region, [ ]Triceps region,  [ ]Ribs region    Muscle wasting: [ ]Temples region, [x ]Clavicle region, [x ]Shoulder region, [ ]Scapula region, [ ]Interosseous region,  [ ]thigh region, [ ]Calf region    Estimated Needs:   [x ] no change since previous assessment  [ ] recalculated:     Current Nutrition Diagnosis:  Pt presents at nutrition risk secondary to moderate-acute protein calorie malnutrition related to inadequate energy intake with increased needs in setting of ESRD on HD as evidenced by increased protein losses via HD, wt loss of 15#(12%) over past 3-4 months, meeting less then 50% estimated energy requirements >5 days, physical signs of muscle mass loss in shoulder/clavicle. Pt eating fair to good. Awaiting HD seat in community.     Recommendations: Continue Nepro supplements BID.    Monitoring and Evaluation:   [ x] PO intake [x ] Tolerance to diet prescription [X] Weights  [X] Follow up per protocol [X] Labs:

## 2018-09-26 NOTE — PROGRESS NOTE ADULT - SUBJECTIVE AND OBJECTIVE BOX
Pt preop for Friday 9/28/18 for L AVF creation  Vein mapping reviewed. May require AVG  Please provide medical clearance

## 2018-09-26 NOTE — PROGRESS NOTE ADULT - SUBJECTIVE AND OBJECTIVE BOX
[facility] DIVISION OF KIDNEY DISEASES AND HYPERTENSION -- FOLLOW UP NOTE  --------------------------------------------------------------------------------  Chief Complaint:  ESRD on HD    24 hour events/subjective:  Pt seen and examined today  NAD  SCR. much improved today - 5.19  S/P CVC  HD in AM        PAST HISTORY  --------------------------------------------------------------------------------  No significant changes to PMH, PSH, FHx, SHx, unless otherwise noted    ALLERGIES & MEDICATIONS  --------------------------------------------------------------------------------  Allergies    No Known Allergies    Intolerances      Standing Inpatient Medications  ascorbic acid 250 milliGRAM(s) Oral daily  calcitriol   Capsule 0.5 MICROGram(s) Oral daily  carvedilol 25 milliGRAM(s) Oral every 12 hours  docusate sodium 100 milliGRAM(s) Oral two times a day  epoetin brianda Injectable 6000 Unit(s) IV Push <User Schedule>  ergocalciferol 92201 Unit(s) Oral every week  heparin  Injectable 5000 Unit(s) SubCutaneous every 12 hours  influenza   Vaccine 0.5 milliLiter(s) IntraMuscular once  sevelamer hydrochloride 800 milliGRAM(s) Oral three times a day    PRN Inpatient Medications  acetaminophen   Tablet .. 650 milliGRAM(s) Oral every 6 hours PRN  ondansetron    Tablet 4 milliGRAM(s) Oral every 8 hours PRN      REVIEW OF SYSTEMS  --------------------------------------------------------------------------------  Gen: No weight changes, fatigue, fevers/chills, weakness  Skin: No rashes  Head/Eyes/Ears/Mouth: No headache; Normal hearing; Normal vision w/o blurriness; No sinus pain/discomfort, sore throat  Respiratory: No dyspnea, cough, wheezing, hemoptysis  CV: No chest pain, PND, orthopnea  GI: No abdominal pain, diarrhea, constipation, nausea, vomiting, melena, hematochezia  : No increased frequency, dysuria, hematuria, nocturia  MSK: No joint pain/swelling; no back pain; no edema  Neuro: No dizziness/lightheadedness, weakness, seizures, numbness, tingling  Heme: No easy bruising or bleeding  Endo: No heat/cold intolerance  Psych: No significant nervousness, anxiety, stress, depression    All other systems were reviewed and are negative, except as noted.    VITALS/PHYSICAL EXAM  --------------------------------------------------------------------------------  T(C): 37.1 (09-26-18 @ 12:02), Max: 37.8 (09-25-18 @ 15:21)  HR: 73 (09-26-18 @ 12:02) (73 - 96)  BP: 109/73 (09-26-18 @ 12:02) (109/73 - 128/74)  RR: 18 (09-26-18 @ 12:02) (16 - 18)  SpO2: 97% (09-25-18 @ 22:00) (97% - 97%)  Wt(kg): --        09-25-18 @ 07:01  -  09-26-18 @ 07:00  --------------------------------------------------------  IN: 0 mL / OUT: 100 mL / NET: -100 mL      Physical Exam:  	Gen: NAD, well-appearing  	HEENT: PERRL, supple neck, clear oropharynx  	Pulm: CTA B/L  	CV: RRR, S1S2; no rub  	Back: No spinal or CVA tenderness; no sacral edema  	Abd: +BS, soft, nontender/nondistended  	: No suprapubic tenderness  	UE: Warm, FROM, no clubbing, intact strength; no edema; no asterixis  	LE: Warm, FROM, no clubbing, intact strength; no edema  	Neuro: No focal deficits, intact gait  	Psych: Normal affect and mood  	Skin: Warm, without rashes  	Vascular access:  CVC +    LABS/STUDIES  --------------------------------------------------------------------------------              9.6    4.4   >-----------<  175      [09-26-18 @ 08:48]              31.4     137  |  100  |  41.0  ----------------------------<  88      [09-26-18 @ 08:48]  4.5   |  23.0  |  5.16        Ca     9.6     [09-26-18 @ 08:48]      Mg     2.3     [09-26-18 @ 08:48]      Phos  4.1     [09-26-18 @ 08:48]    TPro  7.0  /  Alb  3.8  /  TBili  0.3  /  DBili  x   /  AST  20  /  ALT  35  /  AlkPhos  79  [09-26-18 @ 08:48]          Creatinine Trend:  SCr 5.16 [09-26 @ 08:48]  SCr 7.46 [09-25 @ 08:20]  SCr 6.49 [09-24 @ 08:41]  SCr 4.59 [09-23 @ 09:41]  SCr 4.35 [09-22 @ 07:32]    Urinalysis - [09-18-18 @ 14:01]      Color Yellow / Appearance Clear / SG 1.010 / pH 6.0      Gluc Negative / Ketone Negative  / Bili Negative / Urobili Negative       Blood Moderate / Protein 500 / Leuk Est Negative / Nitrite Negative      RBC 3-5 / WBC 3-5 / Hyaline  / Gran  / Sq Epi  / Non Sq Epi Occasional / Bacteria       Iron 45, TIBC 209, %sat 19      [08-31-18 @ 06:22]  Ferritin 58      [08-31-18 @ 06:22]  PTH -- (Ca 8.8)      [09-02-18 @ 14:18]   206  PTH -- (Ca 8.8)      [09-01-18 @ 19:52]   181  HbA1c 5.2      [08-31-18 @ 06:22]  TSH 2.15      [08-31-18 @ 06:22]  Lipid: chol 97, TG 66, HDL 39, LDL 45      [08-31-18 @ 06:22]    HBsAb <3.0      [09-21-18 @ 19:57]  HBsAb Nonreact      [09-01-18 @ 20:51]  HBsAg Nonreact      [09-21-18 @ 19:57]  HBcAb Nonreact      [09-21-18 @ 19:57]  HCV 0.10, Nonreact      [09-21-18 @ 19:57]    ALBA: titer 1:160, pattern Homogeneous      [09-01-18 @ 20:54]  dsDNA 33      [09-01-18 @ 20:54]  C3 Complement 73      [09-01-18 @ 20:12]  C4 Complement 17      [09-01-18 @ 19:52]  ANCA: cANCA Negative, pANCA Negative, atypical ANCA Indeterminate Method interference due to ALBA fluorescence      [09-01-18 @ 20:54]  anti-GBM <0.2      [09-01-18 @ 23:27]  Syphilis Screen (Treponema Pallidum Ab) Negative      [09-01-18 @ 20:54]  PLA2R: SUMAYA 3, IFA Negative      [09-01-18 @ 23:27]  Free Light Chains: kappa 13.37, lambda 15.70, ratio = 0.85      [09-01 @ 19:52]  Immunofixation Serum:   No Monoclonal Band Identified      [09-01-18 @ 19:52]

## 2018-09-27 LAB
ANION GAP SERPL CALC-SCNC: 16 MMOL/L — SIGNIFICANT CHANGE UP (ref 5–17)
BASOPHILS # BLD AUTO: 0 K/UL — SIGNIFICANT CHANGE UP (ref 0–0.2)
BASOPHILS NFR BLD AUTO: 0.2 % — SIGNIFICANT CHANGE UP (ref 0–2)
BLD GP AB SCN SERPL QL: SIGNIFICANT CHANGE UP
BUN SERPL-MCNC: 66 MG/DL — HIGH (ref 8–20)
CALCIUM SERPL-MCNC: 10.4 MG/DL — HIGH (ref 8.6–10.2)
CHLORIDE SERPL-SCNC: 102 MMOL/L — SIGNIFICANT CHANGE UP (ref 98–107)
CO2 SERPL-SCNC: 20 MMOL/L — LOW (ref 22–29)
CREAT SERPL-MCNC: 6.28 MG/DL — HIGH (ref 0.5–1.3)
EOSINOPHIL # BLD AUTO: 0.1 K/UL — SIGNIFICANT CHANGE UP (ref 0–0.5)
EOSINOPHIL NFR BLD AUTO: 1.7 % — SIGNIFICANT CHANGE UP (ref 0–6)
GLUCOSE SERPL-MCNC: 136 MG/DL — HIGH (ref 70–115)
HCT VFR BLD CALC: 31.8 % — LOW (ref 37–47)
HGB BLD-MCNC: 9.8 G/DL — LOW (ref 12–16)
LYMPHOCYTES # BLD AUTO: 1.1 K/UL — SIGNIFICANT CHANGE UP (ref 1–4.8)
LYMPHOCYTES # BLD AUTO: 26.8 % — SIGNIFICANT CHANGE UP (ref 20–55)
MAGNESIUM SERPL-MCNC: 2.5 MG/DL — SIGNIFICANT CHANGE UP (ref 1.6–2.6)
MCHC RBC-ENTMCNC: 26.9 PG — LOW (ref 27–31)
MCHC RBC-ENTMCNC: 30.8 G/DL — LOW (ref 32–36)
MCV RBC AUTO: 87.4 FL — SIGNIFICANT CHANGE UP (ref 81–99)
MONOCYTES # BLD AUTO: 0.6 K/UL — SIGNIFICANT CHANGE UP (ref 0–0.8)
MONOCYTES NFR BLD AUTO: 13.5 % — HIGH (ref 3–10)
NEUTROPHILS # BLD AUTO: 2.4 K/UL — SIGNIFICANT CHANGE UP (ref 1.8–8)
NEUTROPHILS NFR BLD AUTO: 57.1 % — SIGNIFICANT CHANGE UP (ref 37–73)
PHOSPHATE SERPL-MCNC: 5.6 MG/DL — HIGH (ref 2.4–4.7)
PLATELET # BLD AUTO: 170 K/UL — SIGNIFICANT CHANGE UP (ref 150–400)
POTASSIUM SERPL-MCNC: 4.1 MMOL/L — SIGNIFICANT CHANGE UP (ref 3.5–5.3)
POTASSIUM SERPL-SCNC: 4.1 MMOL/L — SIGNIFICANT CHANGE UP (ref 3.5–5.3)
RBC # BLD: 3.64 M/UL — LOW (ref 4.4–5.2)
RBC # FLD: 15.1 % — SIGNIFICANT CHANGE UP (ref 11–15.6)
SODIUM SERPL-SCNC: 138 MMOL/L — SIGNIFICANT CHANGE UP (ref 135–145)
TYPE + AB SCN PNL BLD: SIGNIFICANT CHANGE UP
WBC # BLD: 4.2 K/UL — LOW (ref 4.8–10.8)
WBC # FLD AUTO: 4.2 K/UL — LOW (ref 4.8–10.8)

## 2018-09-27 PROCEDURE — 99232 SBSQ HOSP IP/OBS MODERATE 35: CPT | Mod: GC

## 2018-09-27 PROCEDURE — 90937 HEMODIALYSIS REPEATED EVAL: CPT

## 2018-09-27 RX ORDER — CEFAZOLIN SODIUM 1 G
2000 VIAL (EA) INJECTION ONCE
Qty: 0 | Refills: 0 | Status: DISCONTINUED | OUTPATIENT
Start: 2018-09-27 | End: 2018-09-28

## 2018-09-27 RX ADMIN — SEVELAMER CARBONATE 800 MILLIGRAM(S): 2400 POWDER, FOR SUSPENSION ORAL at 21:43

## 2018-09-27 RX ADMIN — CALCITRIOL 0.5 MICROGRAM(S): 0.5 CAPSULE ORAL at 13:01

## 2018-09-27 RX ADMIN — Medication 250 MILLIGRAM(S): at 13:00

## 2018-09-27 RX ADMIN — SEVELAMER CARBONATE 800 MILLIGRAM(S): 2400 POWDER, FOR SUSPENSION ORAL at 05:51

## 2018-09-27 RX ADMIN — CARVEDILOL PHOSPHATE 25 MILLIGRAM(S): 80 CAPSULE, EXTENDED RELEASE ORAL at 18:44

## 2018-09-27 RX ADMIN — Medication 100 MILLIGRAM(S): at 05:51

## 2018-09-27 RX ADMIN — ERYTHROPOIETIN 6000 UNIT(S): 10000 INJECTION, SOLUTION INTRAVENOUS; SUBCUTANEOUS at 11:15

## 2018-09-27 RX ADMIN — Medication 100 MILLIGRAM(S): at 18:44

## 2018-09-27 RX ADMIN — CARVEDILOL PHOSPHATE 25 MILLIGRAM(S): 80 CAPSULE, EXTENDED RELEASE ORAL at 05:52

## 2018-09-27 RX ADMIN — SEVELAMER CARBONATE 800 MILLIGRAM(S): 2400 POWDER, FOR SUSPENSION ORAL at 13:01

## 2018-09-27 RX ADMIN — HEPARIN SODIUM 5000 UNIT(S): 5000 INJECTION INTRAVENOUS; SUBCUTANEOUS at 05:52

## 2018-09-27 NOTE — PROGRESS NOTE ADULT - SUBJECTIVE AND OBJECTIVE BOX
Patient was seen and evaluated on dialysis.   Patient is tolerating the procedure well.   T(C): 36.8 (09-27-18 @ 12:55), Max: 37.2 (09-26-18 @ 21:43)  HR: 83 (09-27-18 @ 12:55) (63 - 83)  BP: 123/79 (09-27-18 @ 12:55) (110/70 - 141/82)  Continue dialysis: S  Dialyzer: Revaclear 300           QB: 400 ml.,       QD: 500ml.,  Goal UF 0.5 L  over 3  Hours     DBRUCE Salinas & Dr. Szymanski,

## 2018-09-27 NOTE — PROGRESS NOTE ADULT - SUBJECTIVE AND OBJECTIVE BOX
CC: Patient is a 33y old  Female who presents with a chief complaint of edema of lower extremities      Interval events  Patient examined at bedside.  As per nurse, no acute events over night.   Patient is comfortable and feels at her baseline.  Patient is eating w/o nausea, vomiting. Pt understands the need for permanent dialysis. Pt is hopeful to get AVF by tomorrow so she can get back to her kids.. Patient is ambulating. Patient is voiding. Pt had a BM last night.  ROS: Denies fever, chest pain, SOB, abdominal pain, diarrhea, constipation, calf pain.      Vital Signs Last 24 Hrs  Vital Signs Last 24 Hrs  T(C): 37.1 (27 Sep 2018 05:49), Max: 37.2 (26 Sep 2018 21:43)  T(F): 98.7 (27 Sep 2018 05:49), Max: 99 (26 Sep 2018 21:43)  HR: 73 (27 Sep 2018 05:49) (73 - 82)  BP: 141/82 (27 Sep 2018 05:49) (109/73 - 141/82)  RR: 18 (27 Sep 2018 05:49) (18 - 18)  SpO2: 97% (27 Sep 2018 05:49) (97% - 98%)    Physical Exam:   GENERAL: NAD, well-groomed, well-developed. Cooperative.   HEAD:  Atraumatic, Normocephalic  EYES: EOMI, PERRLA, conjunctiva and sclera clear.    NECK: Supple, No JVD  NEURO: Alert & Oriented X3, Good concentration; Motor Strength 5/5 B/L upper and lower extremities;   RESP: Rt sided neck CVC access, Clear to auscultation bilaterally; No rales, rhonchi, wheezing, or rubs  CVS: Regular rate and rhythm; No murmurs, rubs, or gallops. Capillary refill <2s  GI: Soft, Nontender, Nondistended; Bowel sounds present  EXTREMITIES:  2+ Peripheral Pulses, No clubbing, cyanosis, or edema.        LABS                                                        9.6    4.4   )-----------( 175      ( 26 Sep 2018 08:48 )             31.4       09-26    137  |  100  |  41.0<H>  ----------------------------<  88  4.5   |  23.0  |  5.16<H>    Ca    9.6      26 Sep 2018 08:48  Phos  4.1     09-26  Mg     2.3     09-26    TPro  7.0  /  Alb  3.8  /  TBili  0.3<L>  /  DBili  x   /  AST  20  /  ALT  35<H>  /  AlkPhos  79  09-26              Radiology  _______________________________________________________________    US Vessel Mapping Hemodialysis (09.19.18 @ 17:48)   IMPRESSION:  NO EVIDENCE OF DEEP VEIN THROMBOSIS.  CEPHALIC AND BASILIC MEASUREMENTS AS DESCRIBED    MEDICATIONS  (STANDING):  ascorbic acid 250 milliGRAM(s) Oral daily  calcitriol   Capsule 0.5 MICROGram(s) Oral daily  carvedilol 25 milliGRAM(s) Oral every 12 hours  docusate sodium 100 milliGRAM(s) Oral two times a day  epoetin brianda Injectable 6000 Unit(s) IV Push <User Schedule>  ergocalciferol 52751 Unit(s) Oral every week  heparin  Injectable 5000 Unit(s) SubCutaneous every 12 hours  influenza   Vaccine 0.5 milliLiter(s) IntraMuscular once  sevelamer hydrochloride 800 milliGRAM(s) Oral three times a day    MEDICATIONS  (PRN):  acetaminophen   Tablet .. 650 milliGRAM(s) Oral every 6 hours PRN Mild Pain (1 - 3), Moderate Pain (4 - 6)  ondansetron    Tablet 4 milliGRAM(s) Oral every 8 hours PRN Nausea and/or Vomiting

## 2018-09-27 NOTE — PROGRESS NOTE ADULT - SUBJECTIVE AND OBJECTIVE BOX
ALBA TAMRA    [id number]    History:  The patient is status post permacath placement for HD access. Patient is doing well, currently receiving Dialysis.The patient's pain is controlled using the prescribed pain medications.  Currently Denies nausea, vomiting, chest pain, shortness of breath, abdominal pain or fever. No new complaints. No acute motor or sensory changes are reported.    Vital Signs Last 24 Hrs  T(C): 36.7 (27 Sep 2018 09:20), Max: 37.2 (26 Sep 2018 21:43)  T(F): 98.1 (27 Sep 2018 09:20), Max: 99 (26 Sep 2018 21:43)  HR: 63 (27 Sep 2018 09:20) (63 - 82)  BP: 126/76 (27 Sep 2018 09:20) (109/73 - 141/82)  BP(mean): --  RR: 18 (27 Sep 2018 09:20) (18 - 18)  SpO2: 97% (27 Sep 2018 05:49) (97% - 98%)  I&O's Summary    26 Sep 2018 07:01  -  27 Sep 2018 07:00  --------------------------------------------------------  IN: 200 mL / OUT: 0 mL / NET: 200 mL                              9.8    4.2   )-----------( 170      ( 27 Sep 2018 07:50 )             31.8     09-27    138  |  102  |  66.0<H>  ----------------------------<  136<H>  4.1   |  20.0<L>  |  6.28<H>    Ca    10.4<H>      27 Sep 2018 07:50  Phos  5.6     09-27  Mg     2.5     09-27    TPro  7.0  /  Alb  3.8  /  TBili  0.3<L>  /  DBili  x   /  AST  20  /  ALT  35<H>  /  AlkPhos  79  09-26      MEDICATIONS  (STANDING):  ascorbic acid 250 milliGRAM(s) Oral daily  calcitriol   Capsule 0.5 MICROGram(s) Oral daily  carvedilol 25 milliGRAM(s) Oral every 12 hours  docusate sodium 100 milliGRAM(s) Oral two times a day  epoetin brianda Injectable 6000 Unit(s) IV Push <User Schedule>  ergocalciferol 52694 Unit(s) Oral every week  heparin  Injectable 5000 Unit(s) SubCutaneous every 12 hours  influenza   Vaccine 0.5 milliLiter(s) IntraMuscular once  sevelamer hydrochloride 800 milliGRAM(s) Oral three times a day    MEDICATIONS  (PRN):  acetaminophen   Tablet .. 650 milliGRAM(s) Oral every 6 hours PRN Mild Pain (1 - 3), Moderate Pain (4 - 6)  ondansetron    Tablet 4 milliGRAM(s) Oral every 8 hours PRN Nausea and/or Vomiting      Physical exam:     No distress  Alert and Oriented   Unable to perform full examination due to active Hemodialysis process     Assessment:  • ESRD, s/p placement of Permacatheter     Patient awaiting Arteriovenous Fistula creation   •   Secondary  Medical Assessment(s):   •   Plan:   • NPO at midnight   - AVF [placement scheduled for 1 pm 9/28

## 2018-09-27 NOTE — PROGRESS NOTE ADULT - ASSESSMENT
33 y.o. female with h/o migraines and HTN, recently d/c from [facility] on 9/4/18 with dx of acute on chronic renal failure had bx performed which resulted as FSGS; pt currently presented with worsening peripheral edema/ fluid overload and was noted to have worsening kidney dysfunction with metabolic acidosis and hyperkalemia. Pt was admitted with fluid overload and uremia requiring urgent HD. Pt had PC placed and was dialyzed. Nephrology continues to follow the patient. Vein mapping completed. Vascular consulted for AVF. Potential fistula to be placed this week inpatient 9/28/18.      ESRD now on dialysis   ·	Patient had Kidney bx during last recent admission that shows sclerotic changes concerning of collapsing FSGS in setting of mildly elevated dsDNA antibody and decreased C3  ·	stable labs   ·	S/P CVC access 09/20/2018  ·	c/w calcitriol 0.5 MICROGram(s) Oral daily and ergocalciferol 56834 Unit(s) Oral every week  ·	Pt known to have ppd positive, quantiferon negative and cxr negative   -hepatitis panel negative   ·	Vein mapping completed   ·	Pt to be NPO on thursday night   - AVF to be placed on 9/28/18   ·	-c/w regularly scheduled HD   ·	Nephro f/u noted and appreciated  ·	Vascular surgery f/u noted and appreciated    Anemia of chronic kidney disease  ·	stable  ·	baseline Hgb 9-10, w/o change from last admission.  ·	hx of transfusion of prbc during last admission due to post renal bx hemorrhage     Hypertension  ·	Stable  ·	goal BP <140/90, controlled  ·	c/w carvedilol 25 milliGRAM(s) Oral every 12 hours    Migraines  ·	stable, Asymptomatic  ·	Pain controlled as above.     Moderate protein calorie malnutrition   ·	c/w nephro po bid   ·	nutrition consult noted and appreciated     Prophylactic measures  ·	heparin  Injectable 5000 Unit(s) Subcutaneous every 12 hours    Advanced Directives: Full Code  HCP: Patients spouse     Disposition:  D/w CM and SW pt will have a seat in the community for HD on 10/2/18, plan is for the patient to be dialyzed T TH sat, so pt will need HD on 9/29/18 prior to being discharged so pt can c/w HD as an outpt to be started at above date. 33 y.o. female with h/o migraines and HTN, recently d/c from [facility] on 9/4/18 with dx of acute on chronic renal failure had bx performed which resulted as FSGS; pt currently presented with worsening peripheral edema/ fluid overload and was noted to have worsening kidney dysfunction with metabolic acidosis and hyperkalemia. Pt was admitted with fluid overload and uremia requiring urgent HD. Pt had PC placed and was dialyzed. Nephrology continues to follow the patient. Vein mapping completed. Vascular consulted for AVF. Pt to be npo at midnight for AVF in the am     ESRD now on dialysis   ·	Patient had Kidney bx during last recent admission that shows sclerotic changes concerning of collapsing FSGS in setting of mildly elevated dsDNA antibody and decreased C3  ·	stable labs   ·	S/P CVC access 09/20/2018  ·	c/w calcitriol 0.5 MICROGram(s) Oral daily and ergocalciferol 85401 Unit(s) Oral every week  ·	Pt known to have ppd positive, quantiferon negative and cxr negative   -hepatitis panel negative   ·	Vein mapping completed   ·	Pt to be NPO on thursday night   - AVF to be placed on 9/28/18   ·	-c/w regularly scheduled HD   ·	Nephro f/u noted and appreciated  ·	Vascular surgery f/u noted and appreciated  ·	Patient is medically optimized for planned procedure. Pt has >4 mets score, ekg nsr and tte with preserved EF.     Anemia of chronic kidney disease  ·	stable  ·	baseline Hgb 9-10, w/o change from last admission.  ·	hx of transfusion of prbc during last admission due to post renal bx hemorrhage     Hypertension  ·	Stable  ·	goal BP <140/90, controlled  ·	c/w carvedilol 25 milliGRAM(s) Oral every 12 hours    Migraines  ·	stable, Asymptomatic    Moderate protein calorie malnutrition   ·	c/w nephro po bid   ·	nutrition consult noted and appreciated     Prophylactic measures  ·	Holding AC due to pending surgery in the am will reinstate thereafter     Advanced Directives: Full Code  HCP: Patients spouse     Disposition:  D/w CM and SW pt will have a seat in the community for HD on 10/2/18, plan is for the patient to be dialyzed T TH sat, so pt will need HD on 9/29/18 prior to being discharged so pt can c/w HD as an outpt to be started at above date.

## 2018-09-28 LAB
ANION GAP SERPL CALC-SCNC: 13 MMOL/L — SIGNIFICANT CHANGE UP (ref 5–17)
ANISOCYTOSIS BLD QL: SLIGHT — SIGNIFICANT CHANGE UP
BUN SERPL-MCNC: 44 MG/DL — HIGH (ref 8–20)
CALCIUM SERPL-MCNC: 9.8 MG/DL — SIGNIFICANT CHANGE UP (ref 8.6–10.2)
CHLORIDE SERPL-SCNC: 104 MMOL/L — SIGNIFICANT CHANGE UP (ref 98–107)
CO2 SERPL-SCNC: 22 MMOL/L — SIGNIFICANT CHANGE UP (ref 22–29)
CREAT SERPL-MCNC: 5.09 MG/DL — HIGH (ref 0.5–1.3)
EOSINOPHIL # BLD AUTO: 0.1 K/UL — SIGNIFICANT CHANGE UP (ref 0–0.5)
GLUCOSE SERPL-MCNC: 92 MG/DL — SIGNIFICANT CHANGE UP (ref 70–115)
HCG UR QL: NEGATIVE — SIGNIFICANT CHANGE UP
HCT VFR BLD CALC: 32.7 % — LOW (ref 37–47)
HGB BLD-MCNC: 10 G/DL — LOW (ref 12–16)
HYPOCHROMIA BLD QL: SLIGHT — SIGNIFICANT CHANGE UP
INR BLD: 0.92 RATIO — SIGNIFICANT CHANGE UP (ref 0.88–1.16)
LYMPHOCYTES # BLD AUTO: 1.2 K/UL — SIGNIFICANT CHANGE UP (ref 1–4.8)
LYMPHOCYTES # BLD AUTO: 27 % — SIGNIFICANT CHANGE UP (ref 20–55)
MCHC RBC-ENTMCNC: 27 PG — SIGNIFICANT CHANGE UP (ref 27–31)
MCHC RBC-ENTMCNC: 30.6 G/DL — LOW (ref 32–36)
MCV RBC AUTO: 88.1 FL — SIGNIFICANT CHANGE UP (ref 81–99)
MONOCYTES # BLD AUTO: 0.9 K/UL — HIGH (ref 0–0.8)
MONOCYTES NFR BLD AUTO: 20 % — HIGH (ref 3–10)
MYELOCYTES NFR BLD: 2 % — HIGH (ref 0–0)
NEUTROPHILS # BLD AUTO: 2 K/UL — SIGNIFICANT CHANGE UP (ref 1.8–8)
NEUTROPHILS NFR BLD AUTO: 48 % — SIGNIFICANT CHANGE UP (ref 37–73)
NEUTS BAND # BLD: 3 % — SIGNIFICANT CHANGE UP (ref 0–8)
OVALOCYTES BLD QL SMEAR: SLIGHT — SIGNIFICANT CHANGE UP
PLAT MORPH BLD: NORMAL — SIGNIFICANT CHANGE UP
PLATELET # BLD AUTO: 159 K/UL — SIGNIFICANT CHANGE UP (ref 150–400)
POTASSIUM SERPL-MCNC: 5 MMOL/L — SIGNIFICANT CHANGE UP (ref 3.5–5.3)
POTASSIUM SERPL-SCNC: 5 MMOL/L — SIGNIFICANT CHANGE UP (ref 3.5–5.3)
PROTHROM AB SERPL-ACNC: 10.1 SEC — SIGNIFICANT CHANGE UP (ref 9.8–12.7)
RBC # BLD: 3.71 M/UL — LOW (ref 4.4–5.2)
RBC # FLD: 15.6 % — SIGNIFICANT CHANGE UP (ref 11–15.6)
RBC BLD AUTO: ABNORMAL
SODIUM SERPL-SCNC: 139 MMOL/L — SIGNIFICANT CHANGE UP (ref 135–145)
WBC # BLD: 4.4 K/UL — LOW (ref 4.8–10.8)
WBC # FLD AUTO: 4.4 K/UL — LOW (ref 4.8–10.8)

## 2018-09-28 PROCEDURE — 36818 AV FUSE UPPR ARM CEPHALIC: CPT | Mod: LT,58

## 2018-09-28 PROCEDURE — 99232 SBSQ HOSP IP/OBS MODERATE 35: CPT | Mod: GC

## 2018-09-28 PROCEDURE — 99233 SBSQ HOSP IP/OBS HIGH 50: CPT

## 2018-09-28 RX ORDER — OXYCODONE HYDROCHLORIDE 5 MG/1
5 TABLET ORAL EVERY 6 HOURS
Qty: 0 | Refills: 0 | Status: DISCONTINUED | OUTPATIENT
Start: 2018-09-28 | End: 2018-09-29

## 2018-09-28 RX ORDER — ERYTHROPOIETIN 10000 [IU]/ML
6000 INJECTION, SOLUTION INTRAVENOUS; SUBCUTANEOUS
Qty: 0 | Refills: 0 | Status: DISCONTINUED | OUTPATIENT
Start: 2018-09-28 | End: 2018-09-29

## 2018-09-28 RX ORDER — ACETAMINOPHEN 500 MG
650 TABLET ORAL EVERY 6 HOURS
Qty: 0 | Refills: 0 | Status: DISCONTINUED | OUTPATIENT
Start: 2018-09-28 | End: 2018-09-28

## 2018-09-28 RX ORDER — SEVELAMER CARBONATE 2400 MG/1
800 POWDER, FOR SUSPENSION ORAL THREE TIMES A DAY
Qty: 0 | Refills: 0 | Status: DISCONTINUED | OUTPATIENT
Start: 2018-09-28 | End: 2018-09-29

## 2018-09-28 RX ORDER — ONDANSETRON 8 MG/1
4 TABLET, FILM COATED ORAL EVERY 8 HOURS
Qty: 0 | Refills: 0 | Status: DISCONTINUED | OUTPATIENT
Start: 2018-09-28 | End: 2018-09-29

## 2018-09-28 RX ORDER — FENTANYL CITRATE 50 UG/ML
50 INJECTION INTRAVENOUS
Qty: 0 | Refills: 0 | Status: DISCONTINUED | OUTPATIENT
Start: 2018-09-28 | End: 2018-09-28

## 2018-09-28 RX ORDER — SODIUM CHLORIDE 9 MG/ML
1000 INJECTION INTRAMUSCULAR; INTRAVENOUS; SUBCUTANEOUS
Qty: 0 | Refills: 0 | Status: DISCONTINUED | OUTPATIENT
Start: 2018-09-28 | End: 2018-09-28

## 2018-09-28 RX ORDER — CARVEDILOL PHOSPHATE 80 MG/1
25 CAPSULE, EXTENDED RELEASE ORAL EVERY 12 HOURS
Qty: 0 | Refills: 0 | Status: DISCONTINUED | OUTPATIENT
Start: 2018-09-28 | End: 2018-09-29

## 2018-09-28 RX ORDER — CALCITRIOL 0.5 UG/1
0.5 CAPSULE ORAL DAILY
Qty: 0 | Refills: 0 | Status: DISCONTINUED | OUTPATIENT
Start: 2018-09-28 | End: 2018-09-28

## 2018-09-28 RX ORDER — FUROSEMIDE 40 MG
40 TABLET ORAL ONCE
Qty: 0 | Refills: 0 | Status: DISCONTINUED | OUTPATIENT
Start: 2018-09-28 | End: 2018-09-28

## 2018-09-28 RX ORDER — TUBERCULIN PURIFIED PROTEIN DERIVATIVE 5 [IU]/.1ML
5 INJECTION, SOLUTION INTRADERMAL ONCE
Qty: 0 | Refills: 0 | Status: DISCONTINUED | OUTPATIENT
Start: 2018-09-28 | End: 2018-09-28

## 2018-09-28 RX ORDER — CARVEDILOL PHOSPHATE 80 MG/1
25 CAPSULE, EXTENDED RELEASE ORAL EVERY 12 HOURS
Qty: 0 | Refills: 0 | Status: DISCONTINUED | OUTPATIENT
Start: 2018-09-28 | End: 2018-09-28

## 2018-09-28 RX ORDER — ONDANSETRON 8 MG/1
4 TABLET, FILM COATED ORAL ONCE
Qty: 0 | Refills: 0 | Status: DISCONTINUED | OUTPATIENT
Start: 2018-09-28 | End: 2018-09-28

## 2018-09-28 RX ORDER — DOCUSATE SODIUM 100 MG
100 CAPSULE ORAL
Qty: 0 | Refills: 0 | Status: DISCONTINUED | OUTPATIENT
Start: 2018-09-28 | End: 2018-09-29

## 2018-09-28 RX ORDER — SODIUM POLYSTYRENE SULFONATE 4.1 MEQ/G
30 POWDER, FOR SUSPENSION ORAL ONCE
Qty: 0 | Refills: 0 | Status: DISCONTINUED | OUTPATIENT
Start: 2018-09-28 | End: 2018-09-28

## 2018-09-28 RX ORDER — SODIUM BICARBONATE 1 MEQ/ML
0.23 SYRINGE (ML) INTRAVENOUS
Qty: 150 | Refills: 0 | Status: DISCONTINUED | OUTPATIENT
Start: 2018-09-28 | End: 2018-09-28

## 2018-09-28 RX ORDER — ERGOCALCIFEROL 1.25 MG/1
50000 CAPSULE ORAL
Qty: 0 | Refills: 0 | Status: DISCONTINUED | OUTPATIENT
Start: 2018-09-28 | End: 2018-09-29

## 2018-09-28 RX ORDER — DOCUSATE SODIUM 100 MG
100 CAPSULE ORAL
Qty: 0 | Refills: 0 | Status: DISCONTINUED | OUTPATIENT
Start: 2018-09-28 | End: 2018-09-28

## 2018-09-28 RX ORDER — ASCORBIC ACID 60 MG
250 TABLET,CHEWABLE ORAL DAILY
Qty: 0 | Refills: 0 | Status: DISCONTINUED | OUTPATIENT
Start: 2018-09-28 | End: 2018-09-28

## 2018-09-28 RX ORDER — ACETAMINOPHEN 500 MG
650 TABLET ORAL EVERY 6 HOURS
Qty: 0 | Refills: 0 | Status: DISCONTINUED | OUTPATIENT
Start: 2018-09-28 | End: 2018-09-29

## 2018-09-28 RX ORDER — DEXTROSE 50 % IN WATER 50 %
50 SYRINGE (ML) INTRAVENOUS ONCE
Qty: 0 | Refills: 0 | Status: DISCONTINUED | OUTPATIENT
Start: 2018-09-28 | End: 2018-09-28

## 2018-09-28 RX ORDER — IRON SUCROSE 20 MG/ML
100 INJECTION, SOLUTION INTRAVENOUS EVERY 24 HOURS
Qty: 0 | Refills: 0 | Status: DISCONTINUED | OUTPATIENT
Start: 2018-09-28 | End: 2018-09-28

## 2018-09-28 RX ORDER — ERGOCALCIFEROL 1.25 MG/1
50000 CAPSULE ORAL
Qty: 0 | Refills: 0 | Status: DISCONTINUED | OUTPATIENT
Start: 2018-09-28 | End: 2018-09-28

## 2018-09-28 RX ORDER — SODIUM BICARBONATE 1 MEQ/ML
50 SYRINGE (ML) INTRAVENOUS ONCE
Qty: 0 | Refills: 0 | Status: DISCONTINUED | OUTPATIENT
Start: 2018-09-28 | End: 2018-09-28

## 2018-09-28 RX ORDER — SEVELAMER CARBONATE 2400 MG/1
800 POWDER, FOR SUSPENSION ORAL THREE TIMES A DAY
Qty: 0 | Refills: 0 | Status: DISCONTINUED | OUTPATIENT
Start: 2018-09-28 | End: 2018-09-28

## 2018-09-28 RX ORDER — INSULIN HUMAN 100 [IU]/ML
6 INJECTION, SOLUTION SUBCUTANEOUS ONCE
Qty: 0 | Refills: 0 | Status: DISCONTINUED | OUTPATIENT
Start: 2018-09-28 | End: 2018-09-28

## 2018-09-28 RX ORDER — CALCITRIOL 0.5 UG/1
0.25 CAPSULE ORAL DAILY
Qty: 0 | Refills: 0 | Status: DISCONTINUED | OUTPATIENT
Start: 2018-09-28 | End: 2018-09-28

## 2018-09-28 RX ORDER — ONDANSETRON 8 MG/1
4 TABLET, FILM COATED ORAL EVERY 8 HOURS
Qty: 0 | Refills: 0 | Status: DISCONTINUED | OUTPATIENT
Start: 2018-09-28 | End: 2018-09-28

## 2018-09-28 RX ORDER — AMLODIPINE BESYLATE 2.5 MG/1
10 TABLET ORAL DAILY
Qty: 0 | Refills: 0 | Status: DISCONTINUED | OUTPATIENT
Start: 2018-09-28 | End: 2018-09-28

## 2018-09-28 RX ORDER — ERYTHROPOIETIN 10000 [IU]/ML
6000 INJECTION, SOLUTION INTRAVENOUS; SUBCUTANEOUS
Qty: 0 | Refills: 0 | Status: DISCONTINUED | OUTPATIENT
Start: 2018-09-28 | End: 2018-09-28

## 2018-09-28 RX ADMIN — SEVELAMER CARBONATE 800 MILLIGRAM(S): 2400 POWDER, FOR SUSPENSION ORAL at 22:07

## 2018-09-28 RX ADMIN — Medication 100 MILLIGRAM(S): at 05:17

## 2018-09-28 RX ADMIN — SEVELAMER CARBONATE 800 MILLIGRAM(S): 2400 POWDER, FOR SUSPENSION ORAL at 05:17

## 2018-09-28 RX ADMIN — CARVEDILOL PHOSPHATE 25 MILLIGRAM(S): 80 CAPSULE, EXTENDED RELEASE ORAL at 05:16

## 2018-09-28 NOTE — PROGRESS NOTE ADULT - ASSESSMENT
34 y/o F w/ Renal failure needing HD. RIJ permacath placed, with plan for AVF creation today  -OR today for AVF creation  -No LUE venipuncture or IV's  -Continue HD through permacath  -Continued care per primary

## 2018-09-28 NOTE — BRIEF OPERATIVE NOTE - PROCEDURE
<<-----Click on this checkbox to enter Procedure AV fistula  09/28/2018    Active  [facility]

## 2018-09-28 NOTE — PROGRESS NOTE ADULT - SUBJECTIVE AND OBJECTIVE BOX
[facility] DIVISION OF KIDNEY DISEASES AND HYPERTENSION -- FOLLOW UP NOTE  --------------------------------------------------------------------------------  Chief Complaint:  ESRD on HD    24 hour events/subjective:  Pt seen and examined today  NAD  SCR. much improved today - 5.09  S/P CVC  HD in AM      PAST HISTORY  --------------------------------------------------------------------------------  No significant changes to PMH, PSH, FHx, SHx, unless otherwise noted    ALLERGIES & MEDICATIONS  --------------------------------------------------------------------------------  Allergies    No Known Allergies    Intolerances      Standing Inpatient Medications  ascorbic acid 250 milliGRAM(s) Oral daily  calcitriol   Capsule 0.25 MICROGram(s) Oral daily  carvedilol 25 milliGRAM(s) Oral every 12 hours  ceFAZolin   IVPB 2000 milliGRAM(s) IV Intermittent once  docusate sodium 100 milliGRAM(s) Oral two times a day  epoetin brianda Injectable 6000 Unit(s) IV Push <User Schedule>  ergocalciferol 28338 Unit(s) Oral every week  influenza   Vaccine 0.5 milliLiter(s) IntraMuscular once  sevelamer hydrochloride 800 milliGRAM(s) Oral three times a day    PRN Inpatient Medications  acetaminophen   Tablet .. 650 milliGRAM(s) Oral every 6 hours PRN  ondansetron    Tablet 4 milliGRAM(s) Oral every 8 hours PRN      REVIEW OF SYSTEMS  --------------------------------------------------------------------------------  Gen: No weight changes, fatigue, fevers/chills, weakness  Skin: No rashes  Head/Eyes/Ears/Mouth: No headache; Normal hearing; Normal vision w/o blurriness; No sinus pain/discomfort, sore throat  Respiratory: No dyspnea, cough, wheezing, hemoptysis  CV: No chest pain, PND, orthopnea  GI: No abdominal pain, diarrhea, constipation, nausea, vomiting, melena, hematochezia  : No increased frequency, dysuria, hematuria, nocturia  MSK: No joint pain/swelling; no back pain; no edema  Neuro: No dizziness/lightheadedness, weakness, seizures, numbness, tingling  Heme: No easy bruising or bleeding  Endo: No heat/cold intolerance  Psych: No significant nervousness, anxiety, stress, depression    All other systems were reviewed and are negative, except as noted.    VITALS/PHYSICAL EXAM  --------------------------------------------------------------------------------  T(C): 36.8 (09-27-18 @ 20:11), Max: 37.3 (09-27-18 @ 16:26)  HR: 80 (09-27-18 @ 20:11) (80 - 83)  BP: 101/66 (09-27-18 @ 20:11) (101/66 - 134/83)  RR: 16 (09-27-18 @ 20:11) (16 - 18)  SpO2: --  Wt(kg): --        09-27-18 @ 07:01  -  09-28-18 @ 07:00  --------------------------------------------------------  IN: 240 mL / OUT: 500 mL / NET: -260 mL      Physical Exam:  	Gen: NAD, well-appearing  	HEENT: PERRL, supple neck, clear oropharynx  	Pulm: CTA B/L  	CV: RRR, S1S2; no rub  	Back: No spinal or CVA tenderness; no sacral edema  	Abd: +BS, soft, nontender/nondistended  	: No suprapubic tenderness  	UE: Warm, FROM, no clubbing, intact strength; no edema; no asterixis  	LE: Warm, FROM, no clubbing, intact strength; no edema  	Neuro: No focal deficits, intact gait  	Psych: Normal affect and mood  	Skin: Warm, without rashes  	Vascular access:  [facility] CV +    LABS/STUDIES  --------------------------------------------------------------------------------              10.0   4.4   >-----------<  159      [09-28-18 @ 12:33]              32.7     139  |  104  |  44.0  ----------------------------<  92      [09-28-18 @ 09:18]  5.0   |  22.0  |  5.09        Ca     9.8     [09-28-18 @ 09:18]      Mg     2.5     [09-27-18 @ 07:50]      Phos  5.6     [09-27-18 @ 07:50]      PT/INR: PT 10.1 , INR 0.92       [09-28-18 @ 09:28]      Creatinine Trend:  SCr 5.09 [09-28 @ 09:18]  SCr 6.28 [09-27 @ 07:50]  SCr 5.16 [09-26 @ 08:48]  SCr 7.46 [09-25 @ 08:20]  SCr 6.49 [09-24 @ 08:41]    Urinalysis - [09-18-18 @ 14:01]      Color Yellow / Appearance Clear / SG 1.010 / pH 6.0      Gluc Negative / Ketone Negative  / Bili Negative / Urobili Negative       Blood Moderate / Protein 500 / Leuk Est Negative / Nitrite Negative      RBC 3-5 / WBC 3-5 / Hyaline  / Gran  / Sq Epi  / Non Sq Epi Occasional / Bacteria       Iron 45, TIBC 209, %sat 19      [08-31-18 @ 06:22]  Ferritin 58      [08-31-18 @ 06:22]  PTH -- (Ca 8.8)      [09-02-18 @ 14:18]   206  PTH -- (Ca 8.8)      [09-01-18 @ 19:52]   181  HbA1c 5.2      [08-31-18 @ 06:22]  TSH 2.15      [08-31-18 @ 06:22]  Lipid: chol 97, TG 66, HDL 39, LDL 45      [08-31-18 @ 06:22]    HBsAb <3.0      [09-21-18 @ 19:57]  HBsAb Nonreact      [09-01-18 @ 20:51]  HBsAg Nonreact      [09-21-18 @ 19:57]  HBcAb Nonreact      [09-21-18 @ 19:57]  HCV 0.10, Nonreact      [09-21-18 @ 19:57]    ALBA: titer 1:160, pattern Homogeneous      [09-01-18 @ 20:54]  dsDNA 33      [09-01-18 @ 20:54]  C3 Complement 73      [09-01-18 @ 20:12]  C4 Complement 17      [09-01-18 @ 19:52]  ANCA: cANCA Negative, pANCA Negative, atypical ANCA Indeterminate Method interference due to ALBA fluorescence      [09-01-18 @ 20:54]  anti-GBM <0.2      [09-01-18 @ 23:27]  Syphilis Screen (Treponema Pallidum Ab) Negative      [09-01-18 @ 20:54]  PLA2R: SUMAYA 3, IFA Negative      [09-01-18 @ 23:27]  Free Light Chains: kappa 13.37, lambda 15.70, ratio = 0.85      [09-01 @ 19:52]  Immunofixation Serum:   No Monoclonal Band Identified      [09-01-18 @ 19:52]

## 2018-09-28 NOTE — PROGRESS NOTE ADULT - ASSESSMENT
1. ESRD on HD  2. Anemia of Chronic Disease  3. HTN  4. 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  Continue epogen, ascorbic acid  Oral Tab/Cap - Peds 250 milliGRAM(s) Oral daily  goal BP <140/90, controlled  Continue carvedilol 25 milliGRAM(s) Oral every 12 hours, amlodipine 10 mg Oral qd  Migraine stable, asymptomatic  Continue current management

## 2018-09-28 NOTE — CHART NOTE - NSCHARTNOTEFT_GEN_A_CORE
POST-OP CHECK: Patient seen and examined at bedside - she is s/p creation of left radiocephalic AVF. Spoke with patient in her preferred language of Mong[place]ian with help of [employer] , ID# [id number]. Patient admits to mild pain in left hand. States she has not been given pain medication - explained to patient that medication is as-needed and she must ask RN for it. Pt expressed understanding. She denies numbness / tingling to LUE. Has been able to move LUE without difficulty. Has tolerated small amount of food, sshe has not yet gotten OOB or voided since the procedure however feels as though she needs to void now. Pt denies fever, chills, CP, or SOB at this time.     MEDICATIONS  (STANDING):  carvedilol 25 milliGRAM(s) Oral every 12 hours  docusate sodium 100 milliGRAM(s) Oral two times a day  epoetin brianda Injectable 6000 Unit(s) IV Push <User Schedule>  ergocalciferol 97292 Unit(s) Oral every week  influenza   Vaccine 0.5 milliLiter(s) IntraMuscular once  sevelamer hydrochloride 800 milliGRAM(s) Oral three times a day    MEDICATIONS  (PRN):  acetaminophen   Tablet .. 650 milliGRAM(s) Oral every 6 hours PRN Mild Pain (1 - 3), Moderate Pain (4 - 6)  ondansetron    Tablet 4 milliGRAM(s) Oral every 8 hours PRN Nausea and/or Vomiting  oxyCODONE    IR 5 milliGRAM(s) Oral every 6 hours PRN Severe Pain (7 - 10)      Vital Signs Last 24 Hrs  T(C): 36.5 (28 Sep 2018 17:49), Max: 36.7 (28 Sep 2018 03:13)  T(F): 97.7 (28 Sep 2018 17:49), Max: 98.1 (28 Sep 2018 03:13)  HR: 74 (28 Sep 2018 18:30) (65 - 80)  BP: 125/79 (28 Sep 2018 18:30) (125/79 - 140/90)  BP(mean): --  RR: 16 (28 Sep 2018 18:30) (12 - 16)  SpO2: 100% (28 Sep 2018 18:30) (98% - 100%)    Constitutional: well appearing female appears comfortable in bed, family at bedside, NAD  Neck: R IJ permacath in place, site is C/D/I, no surrouding erythema or drainage  Respiratory: respirations are unlabored, no accessory muscle use, no conversational dyspnea  Cardiovascular: regular rate & rhythm  Vascular: LUE overlying ACE bandage is C/D/I. LUE compartments are soft, mild TTP over fistula site, palpable thrill. Distal sensation to LUE is grossly intact. Skin is pink & warm. Brisk capillary refill  Neurological: A&O x 3; no gross sensory / motor / coordination deficits      I&O's Detail    27 Sep 2018 07:01  -  28 Sep 2018 07:00  --------------------------------------------------------  IN:    Oral Fluid: 240 mL  Total IN: 240 mL    OUT:    Other: 500 mL  Total OUT: 500 mL    Total NET: -260 mL          LABS:                        10.0   4.4   )-----------( 159      ( 28 Sep 2018 12:33 )             32.7     09-28    139  |  104  |  44.0<H>  ----------------------------<  92  5.0   |  22.0  |  5.09<H>    Ca    9.8      28 Sep 2018 09:18  Phos  5.6     09-27  Mg     2.5     09-27      PT/INR - ( 28 Sep 2018 09:28 )   PT: 10.1 sec;   INR: 0.92 ratio           34 y/o female POD#0 s/p creation of left radiocephalic AVF. Pt tolerated procedure well. Remains HD stable, LUE neurovascularly intact  - Continue HD via R IJ permacath  - HD scheduling as per nephrology  - No IVs or blood draws from LUE  - Monitor AVF site and neurovasc status of LUE  - Pain control PRN  - Remainder of care as per primary team POST-OP CHECK: Patient seen and examined at bedside - she is s/p creation of left radiocephalic AVF. Spoke with patient in her preferred language of Sets[place] with help of [employer] , ID# [id number]. Patient admits to mild pain in left hand. States she has not been given pain medication - explained to patient that medication is as-needed and she must ask RN for it. Pt expressed understanding. She denies numbness / tingling to LUE. Has been able to move LUE without difficulty. Pt denies fever, chills, N/V, CP, or SOB at this time.     MEDICATIONS  (STANDING):  carvedilol 25 milliGRAM(s) Oral every 12 hours  docusate sodium 100 milliGRAM(s) Oral two times a day  epoetin brianda Injectable 6000 Unit(s) IV Push <User Schedule>  ergocalciferol 90180 Unit(s) Oral every week  influenza   Vaccine 0.5 milliLiter(s) IntraMuscular once  sevelamer hydrochloride 800 milliGRAM(s) Oral three times a day    MEDICATIONS  (PRN):  acetaminophen   Tablet .. 650 milliGRAM(s) Oral every 6 hours PRN Mild Pain (1 - 3), Moderate Pain (4 - 6)  ondansetron    Tablet 4 milliGRAM(s) Oral every 8 hours PRN Nausea and/or Vomiting  oxyCODONE    IR 5 milliGRAM(s) Oral every 6 hours PRN Severe Pain (7 - 10)      Vital Signs Last 24 Hrs  T(C): 36.5 (28 Sep 2018 17:49), Max: 36.7 (28 Sep 2018 03:13)  T(F): 97.7 (28 Sep 2018 17:49), Max: 98.1 (28 Sep 2018 03:13)  HR: 74 (28 Sep 2018 18:30) (65 - 80)  BP: 125/79 (28 Sep 2018 18:30) (125/79 - 140/90)  BP(mean): --  RR: 16 (28 Sep 2018 18:30) (12 - 16)  SpO2: 100% (28 Sep 2018 18:30) (98% - 100%)    Constitutional: well appearing female appears comfortable in bed, family at bedside, NAD  Neck: R IJ permacath in place, site is C/D/I, no surrouding erythema or drainage  Respiratory: respirations are unlabored, no accessory muscle use, no conversational dyspnea  Cardiovascular: regular rate & rhythm  Vascular: LUE overlying ACE bandage is C/D/I. LUE compartments are soft, mild TTP over fistula site, palpable thrill. Distal sensation to LUE is grossly intact. Skin is pink & warm. Brisk capillary refill  Neurological: A&O x 3; no gross sensory / motor / coordination deficits      I&O's Detail    27 Sep 2018 07:01  -  28 Sep 2018 07:00  --------------------------------------------------------  IN:    Oral Fluid: 240 mL  Total IN: 240 mL    OUT:    Other: 500 mL  Total OUT: 500 mL    Total NET: -260 mL          LABS:                        10.0   4.4   )-----------( 159      ( 28 Sep 2018 12:33 )             32.7     09-28    139  |  104  |  44.0<H>  ----------------------------<  92  5.0   |  22.0  |  5.09<H>    Ca    9.8      28 Sep 2018 09:18  Phos  5.6     09-27  Mg     2.5     09-27      PT/INR - ( 28 Sep 2018 09:28 )   PT: 10.1 sec;   INR: 0.92 ratio           32 y/o female POD#0 s/p creation of left radiocephalic AVF. Pt tolerated procedure well. Remains HD stable, LUE neurovascularly intact  - Continue HD via R IJ permacath  - HD scheduling as per nephrology  - No IVs or blood draws from LUE  - Monitor AVF site and neurovasc status of LUE  - Pain control PRN  - Remainder of care as per primary team

## 2018-09-28 NOTE — PROGRESS NOTE ADULT - SUBJECTIVE AND OBJECTIVE BOX
The patient is a 33y old female who presents with edema of lower extremities and lab values  indicating renal failure.  (27 Sep 2018 14:58)      PAST MEDICAL HISTORY:  Hyperprolactinemia  Stage 5 chronic kidney disease not on chronic dialysis  Migraine   Hypertension      PAST SURGICAL HISTORY:  History of bilateral tubal ligation      MEDICATIONS  (STANDING):  ascorbic acid 250 milliGRAM(s) Oral daily  calcitriol   Capsule 0.25 MICROGram(s) Oral daily  carvedilol 25 milliGRAM(s) Oral every 12 hours  ceFAZolin   IVPB 2000 milliGRAM(s) IV Intermittent once  docusate sodium 100 milliGRAM(s) Oral two times a day  epoetin brianda Injectable 6000 Unit(s) IV Push <User Schedule>  ergocalciferol 56110 Unit(s) Oral every week  influenza   Vaccine 0.5 milliLiter(s) IntraMuscular once  sevelamer hydrochloride 800 milliGRAM(s) Oral three times a day    MEDICATIONS  (PRN):  acetaminophen   Tablet .. 650 milliGRAM(s) Oral every 6 hours PRN Mild Pain (1 - 3), Moderate Pain (4 - 6)  ondansetron    Tablet 4 milliGRAM(s) Oral every 8 hours PRN Nausea and/or Vomiting      Allergies:     No Known Drug Allergies      SOCIAL HISTORY:  The patient does not smoke, drink alcohol or use illicit drugs.                          9.8    4.2   )-----------( 170      ( 27 Sep 2018 07:50 )             31.8       PT/INR - ( 19 Sep 2018 19:13 )   PT: 11.9 sec;   INR: 1.08 ratio         PTT - ( 19 Sep 2018 19:13 )  PTT:33.7 sec    09-27    138  |  102  |  66.0<H>  ----------------------------<  136<H>  4.1   |  20.0<L>  |  6.28<H>    Ca    10.4<H>      27 Sep 2018 07:50  Phos  5.6     09-27  Mg     2.5     09-27    EKG:  -  8/31/2018    Normal sinus rhythm  WNL    TT ECHO:  -  8/31/2018  Summary:   1. Left ventricular ejection fraction, by visual estimation, is >75%.   2. Normal global left ventricular systolic function.   3. Spectral Doppler shows impaired relaxation pattern of left        ventricular myocardial filling (Grade I diastolic dysfunction).   4. Trace tricuspid regurgitation.   5. Trace pulmonic valve regurgitation.    ASA # = 3  Mallampati # = 1 The patient is a 33y old female who presents with edema of lower extremities and lab values  indicating renal failure.   She is scheduled to have a LEFT ARTERIOVENOUS FISTULA CREATION,  POSSIBLE ARTERIOVENOUS GRAFT.    (27 Sep 2018 14:58)      PAST MEDICAL HISTORY:  Hyperprolactinemia  Stage 5 chronic kidney disease not on chronic dialysis  Migraine   Hypertension      PAST SURGICAL HISTORY:  History of bilateral tubal ligation      MEDICATIONS  (STANDING):  ascorbic acid 250 milliGRAM(s) Oral daily  calcitriol   Capsule 0.25 MICROGram(s) Oral daily  carvedilol 25 milliGRAM(s) Oral every 12 hours  ceFAZolin   IVPB 2000 milliGRAM(s) IV Intermittent once  docusate sodium 100 milliGRAM(s) Oral two times a day  epoetin brianda Injectable 6000 Unit(s) IV Push <User Schedule>  ergocalciferol 47697 Unit(s) Oral every week  influenza   Vaccine 0.5 milliLiter(s) IntraMuscular once  sevelamer hydrochloride 800 milliGRAM(s) Oral three times a day    MEDICATIONS  (PRN):  acetaminophen   Tablet .. 650 milliGRAM(s) Oral every 6 hours PRN Mild Pain (1 - 3), Moderate Pain (4 - 6)  ondansetron    Tablet 4 milliGRAM(s) Oral every 8 hours PRN Nausea and/or Vomiting      Allergies:     No Known Drug Allergies      SOCIAL HISTORY:  The patient does not smoke, drink alcohol or use illicit drugs.                          9.8    4.2   )-----------( 170      ( 27 Sep 2018 07:50 )             31.8       PT/INR - ( 19 Sep 2018 19:13 )   PT: 11.9 sec;   INR: 1.08 ratio         PTT - ( 19 Sep 2018 19:13 )  PTT:33.7 sec    09-27    138  |  102  |  66.0<H>  ----------------------------<  136<H>  4.1   |  20.0<L>  |  6.28<H>    Ca    10.4<H>      27 Sep 2018 07:50  Phos  5.6     09-27  Mg     2.5     09-27    EKG:  -  8/31/2018    Normal sinus rhythm  WNL    TT ECHO:  -  8/31/2018  Summary:   1. Left ventricular ejection fraction, by visual estimation, is >75%.   2. Normal global left ventricular systolic function.   3. Spectral Doppler shows impaired relaxation pattern of left        ventricular myocardial filling (Grade I diastolic dysfunction).   4. Trace tricuspid regurgitation.   5. Trace pulmonic valve regurgitation.    ASA # = 3  Mallampati # = 1

## 2018-09-28 NOTE — PROGRESS NOTE ADULT - SUBJECTIVE AND OBJECTIVE BOX
INTERVAL HPI/OVERNIGHT EVENTS: No acute events overnight    SUBJECTIVE: Denies pain this AM. Afebrile. NPO for AVF creation today, othwerise tolerating diet. HD without issues through [facility] permacat. Ambulating without assistance. Denies F/C/N/V/CP/SOB.       MEDICATIONS  (STANDING):  ascorbic acid 250 milliGRAM(s) Oral daily  calcitriol   Capsule 0.25 MICROGram(s) Oral daily  carvedilol 25 milliGRAM(s) Oral every 12 hours  ceFAZolin   IVPB 2000 milliGRAM(s) IV Intermittent once  docusate sodium 100 milliGRAM(s) Oral two times a day  epoetin brianda Injectable 6000 Unit(s) IV Push <User Schedule>  ergocalciferol 18779 Unit(s) Oral every week  influenza   Vaccine 0.5 milliLiter(s) IntraMuscular once  sevelamer hydrochloride 800 milliGRAM(s) Oral three times a day    MEDICATIONS  (PRN):  acetaminophen   Tablet .. 650 milliGRAM(s) Oral every 6 hours PRN Mild Pain (1 - 3), Moderate Pain (4 - 6)  ondansetron    Tablet 4 milliGRAM(s) Oral every 8 hours PRN Nausea and/or Vomiting      Vital Signs Last 24 Hrs  T(C): 36.8 (27 Sep 2018 20:11), Max: 37.3 (27 Sep 2018 16:26)  T(F): 98.2 (27 Sep 2018 20:11), Max: 99.1 (27 Sep 2018 16:26)  HR: 80 (27 Sep 2018 20:11) (80 - 83)  BP: 101/66 (27 Sep 2018 20:11) (101/66 - 134/83)  BP(mean): --  RR: 16 (27 Sep 2018 20:11) (16 - 18)  SpO2: --    PE  Gen: AAO x3, NAD  Pulm: RIJ permacat site c/d/i. Without evidence of infection. CTAB, Symmetrical chest rise  CV: RRR  Abd: Soft, NT, ND, -R/-G  Ext: No C/C/E  Vasc: Extremity warm. <2 sec capillary refill. 2+ Radial, Femoral Popliteal, DP/PT pulses  Neuro: No focal neurological deficits      I&O's Detail    27 Sep 2018 07:01  -  28 Sep 2018 07:00  --------------------------------------------------------  IN:    Oral Fluid: 240 mL  Total IN: 240 mL    OUT:    Other: 500 mL  Total OUT: 500 mL    Total NET: -260 mL          LABS:                        9.8    4.2   )-----------( 170      ( 27 Sep 2018 07:50 )             31.8     09-28    139  |  104  |  44.0<H>  ----------------------------<  92  5.0   |  22.0  |  5.09<H>    Ca    9.8      28 Sep 2018 09:18  Phos  5.6     09-27  Mg     2.5     09-27      PT/INR - ( 28 Sep 2018 09:28 )   PT: 10.1 sec;   INR: 0.92 ratio

## 2018-09-28 NOTE — PROGRESS NOTE ADULT - SUBJECTIVE AND OBJECTIVE BOX
CC: Patient is a 33y old  Female who presents with a chief complaint of edema of lower extremities      Interval events  Patient examined at bedside.  As per nurse, no acute events over night.   Patient is comfortable and feels at her baseline.  Patient is eating w/o nausea, vomiting. Pt is hopeful to get AVF placed today so she can be discharged. Patient is ambulating. Patient is voiding. Pt had a BM last night.  ROS: Denies fever, chest pain, SOB, abdominal pain, diarrhea, constipation, calf pain.      Vital Signs Last 24 Hrs    Vital Signs Last 24 Hrs  T(C): 36.7 (28 Sep 2018 03:13), Max: 36.8 (27 Sep 2018 20:11)  T(F): 98.1 (28 Sep 2018 03:13), Max: 98.2 (27 Sep 2018 20:11)  HR: 80 (28 Sep 2018 03:13) (80 - 80)  BP: 140/90 (28 Sep 2018 03:13) (101/66 - 140/90)  RR: 16 (28 Sep 2018 03:13) (16 - 16)  SpO2: 99% (28 Sep 2018 03:13) (99% - 99%)    Physical Exam:   GENERAL: NAD, well-groomed, well-developed. Cooperative.   HEAD:  Atraumatic, Normocephalic  EYES: EOMI, PERRLA, conjunctiva and sclera clear.    NECK: Supple, No JVD  NEURO: Alert & Oriented X3, Good concentration; Motor Strength 5/5 B/L upper and lower extremities;   RESP: Rt sided neck CVC access, Clear to auscultation bilaterally; No rales, rhonchi, wheezing, or rubs  CVS: Regular rate and rhythm; No murmurs, rubs, or gallops. Capillary refill <2s  GI: Soft, Nontender, Nondistended; Bowel sounds present  EXTREMITIES:  2+ Peripheral Pulses, No clubbing, cyanosis, or edema.        LABS                                                          10.0   4.4   )-----------( 159      ( 28 Sep 2018 12:33 )             32.7   09-28    139  |  104  |  44.0<H>  ----------------------------<  92  5.0   |  22.0  |  5.09<H>    Ca    9.8      28 Sep 2018 09:18  Phos  5.6     09-27  Mg     2.5     09-27          Radiology  _______________________________________________________________    US Vessel Mapping Hemodialysis (09.19.18 @ 17:48)   IMPRESSION:  NO EVIDENCE OF DEEP VEIN THROMBOSIS.  CEPHALIC AND BASILIC MEASUREMENTS AS DESCRIBED    MEDICATIONS  (STANDING):  ascorbic acid 250 milliGRAM(s) Oral daily  calcitriol   Capsule 0.5 MICROGram(s) Oral daily  carvedilol 25 milliGRAM(s) Oral every 12 hours  docusate sodium 100 milliGRAM(s) Oral two times a day  epoetin brianda Injectable 6000 Unit(s) IV Push <User Schedule>  ergocalciferol 97752 Unit(s) Oral every week  heparin  Injectable 5000 Unit(s) SubCutaneous every 12 hours  influenza   Vaccine 0.5 milliLiter(s) IntraMuscular once  sevelamer hydrochloride 800 milliGRAM(s) Oral three times a day    MEDICATIONS  (PRN):  acetaminophen   Tablet .. 650 milliGRAM(s) Oral every 6 hours PRN Mild Pain (1 - 3), Moderate Pain (4 - 6)  ondansetron    Tablet 4 milliGRAM(s) Oral every 8 hours PRN Nausea and/or Vomiting Patient is a 33y old  Female who presents with a chief complaint of edema of lower extremities (28 Sep 2018 16:40) and admitted with acute renal failure requiring hemodialysis     Interval events: [place] speaking   Patient examined at bedside. No acute events overnight. Patient states she is doing well, has no complaint and is anxious about having the surgery so she can go home. She understands that she will have the AVF today and then be discharged tomorrow after HD to resume HD T TH sat in the community.   Patient is eating w/o nausea, vomiting. Patient is ambulating. Patient is voiding. Pt had a BM last night.    ROS: Denies fever, chest pain, SOB, abdominal pain, diarrhea, constipation, calf pain.      Vital Signs Last 24 Hrs  Vital Signs Last 24 Hrs  T(C): 36.7 (28 Sep 2018 03:13), Max: 36.8 (27 Sep 2018 20:11)  T(F): 98.1 (28 Sep 2018 03:13), Max: 98.2 (27 Sep 2018 20:11)  HR: 80 (28 Sep 2018 03:13) (80 - 80)  BP: 140/90 (28 Sep 2018 03:13) (101/66 - 140/90)  RR: 16 (28 Sep 2018 03:13) (16 - 16)  SpO2: 99% (28 Sep 2018 03:13) (99% - 99%)    Physical Exam:   GENERAL: NAD, well-groomed, well-developed. Thin Cooperative.   HEAD:  Atraumatic, Normocephalic  EYES: EOMI, PERRLA, conjunctiva and sclera clear.    NECK: Supple, No JVD  NEURO: Alert & Oriented X3, Good concentration; Motor Strength 5/5 B/L upper and lower extremities;   RESP: Clear to auscultation bilaterally; No rales, rhonchi, wheezing, or rubs     Rt sided neck CVC access, C/D/I   CVS: Regular rate and rhythm; No murmurs, rubs, or gallops. Capillary refill <2s  GI: Soft, Nontender, Nondistended; Bowel sounds present  EXTREMITIES:  2+ Peripheral Pulses, No clubbing, cyanosis, or edema.        LABS                                                          10.0   4.4   )-----------( 159      ( 28 Sep 2018 12:33 )             32.7   09-28    139  |  104  |  44.0<H>  ----------------------------<  92  5.0   |  22.0  |  5.09<H>    Ca    9.8      28 Sep 2018 09:18  Phos  5.6     09-27  Mg     2.5     09-27      Radiology  _______________________________________________________________    US Vessel Mapping Hemodialysis (09.19.18 @ 17:48)   IMPRESSION:  NO EVIDENCE OF DEEP VEIN THROMBOSIS.  CEPHALIC AND BASILIC MEASUREMENTS AS DESCRIBED    MEDICATIONS  (STANDING):  ascorbic acid 250 milliGRAM(s) Oral daily  calcitriol   Capsule 0.5 MICROGram(s) Oral daily  carvedilol 25 milliGRAM(s) Oral every 12 hours  docusate sodium 100 milliGRAM(s) Oral two times a day  epoetin brianda Injectable 6000 Unit(s) IV Push <User Schedule>  ergocalciferol 49150 Unit(s) Oral every week  heparin  Injectable 5000 Unit(s) SubCutaneous every 12 hours  influenza   Vaccine 0.5 milliLiter(s) IntraMuscular once  sevelamer hydrochloride 800 milliGRAM(s) Oral three times a day    MEDICATIONS  (PRN):  acetaminophen   Tablet .. 650 milliGRAM(s) Oral every 6 hours PRN Mild Pain (1 - 3), Moderate Pain (4 - 6)  ondansetron    Tablet 4 milliGRAM(s) Oral every 8 hours PRN Nausea and/or Vomiting

## 2018-09-28 NOTE — PROGRESS NOTE ADULT - ASSESSMENT
33 y.o. female with h/o migraines and HTN, recently d/c from [facility] on 9/4/18 with dx of acute on chronic renal failure had bx performed which resulted as FSGS; pt currently presented with worsening peripheral edema/ fluid overload and was noted to have worsening kidney dysfunction with metabolic acidosis and hyperkalemia. Pt was admitted with fluid overload and uremia requiring urgent HD. Pt had PC placed and was dialyzed. Nephrology continues to follow the patient. Vein mapping completed. Vascular consulted for AVF. Pt was npo after midnight and was scheduled for AVF today.  ESRD now on dialysis   ·	Patient had Kidney bx during last recent admission that shows sclerotic changes concerning of collapsing FSGS in setting of mildly elevated dsDNA antibody and decreased C3  ·	stable labs   ·	S/P CVC access 09/20/2018  ·	c/w calcitriol 0.5 MICROGram(s) Oral daily and ergocalciferol 52311 Unit(s) Oral every week  ·	Pt known to have ppd positive, quantiferon negative and cxr negative   -hepatitis panel negative   ·	Vein mapping completed   ·	Pt to be NPO on thursday night   - AVF to be placed on 9/28/18   ·	-c/w regularly scheduled HD   ·	Nephro f/u noted and appreciated  ·	Vascular surgery f/u noted and appreciated  ·	Patient is medically optimized for planned procedure. Pt has >4 mets score, ekg nsr and tte with preserved EF.   ·	Pt scheduled for dialysis tomorrow  ·	Possible discharge tomorrow if AVF placed.    Anemia of chronic kidney disease  ·	stable  ·	baseline Hgb 9-10, w/o change from last admission.  ·	hx of transfusion of prbc during last admission due to post renal bx hemorrhage     Hypertension  ·	Stable  ·	goal BP <140/90, controlled  ·	c/w carvedilol 25 milliGRAM(s) Oral every 12 hours    Migraines  ·	stable, Asymptomatic    Moderate protein calorie malnutrition   ·	c/w nephro po bid   ·	nutrition consult noted and appreciated     Prophylactic measures  ·	Holding AC due to pending surgery in the am will reinstate thereafter     Advanced Directives: Full Code  HCP: Patients spouse     Disposition:  D/w CM and SW pt will have a seat in the community for HD on 10/2/18, plan is for the patient to be dialyzed T TH sat, so pt will need HD on 9/29/18 prior to being discharged so pt can c/w HD as an outpt to be started at above date. 33 y.o. female with h/o migraines, elevated prolactin (ammenohrea) and HTN, recently d/c from [facility] on 9/4/18 with dx of acute on chronic renal failure had bx performed which resulted as FSGS; pt currently presented with worsening peripheral edema/ fluid overload and was noted to have worsening kidney dysfunction with metabolic acidosis with hyperkalemia. Pt was admitted with fluid overload and uremia requiring urgent HD. Pt had PC placed and was dialyzed, was being dialyzed in the hospital with improvement in all labs and sx. Nephrology continues to follow the patient. Vein mapping was completed and vascular consulted and pt is for AVF placement today. If no intraoperative complications plan is for pt to be dialyzed tomorrow and be discharged home thereafter to resume HD in the community T TH Sat.       Acute on chronic renal failure with Uremia/ Fluid overload now ESRD requiring hemodialysis   ·	Patient had Kidney bx during last recent admission that shows sclerotic changes concerning of collapsing FSGS in setting of mildly elevated dsDNA antibody and decreased C3  ·	stable labs since admission   ·	S/P CVC access 09/20/2018  ·	c/w calcitriol 0.5 MICROGram(s) Oral daily and ergocalciferol 93984 Unit(s) Oral every week  ·	Pt known to have ppd positive, quantiferon negative and cxr negative   -hepatitis panel negative   ·	Vein mapping completed   ·	Pt currently NPO  - AVF to be placed today   ·	-c/w regularly scheduled HD T TH Sat   ·	Nephro f/u noted and appreciated  ·	Vascular surgery f/u noted and appreciated  ·	Patient is medically optimized for planned procedure. Pt has >4 mets score, ekg nsr and tte with preserved EF.   ·	Pt scheduled for dialysis tomorrow and thereafter can be discharged to f/u in the community with HD T TH Sat (arranged by CM and SW)    Anemia of chronic kidney disease  ·	stable  ·	baseline Hgb 9-10, w/o change from last admission.  ·	hx of transfusion of prbc during last admission due to post renal bx hemorrhage   ·	Epogen if hgb drops below 10 as per nephro     Hypertension  ·	Stable  ·	goal BP <140/90, controlled  ·	c/w carvedilol 25 milliGRAM(s) Oral every 12 hours    Migraines  ·	stable, Asymptomatic    Elevated prolactin with amenorrhea   -Noted on prior hospitalizations  - pt advised to f/u with endocrine and OB GYN as an outpt     Moderate protein calorie malnutrition   ·	c/w nephro po bid   ·	nutrition consult noted and appreciated     Prophylactic measures  ·	Holding AC due to pending surgery today and should be restarted tomorrow - heparin sq     Advanced Directives: Full Code  HCP: Patients spouse     Disposition:  D/w CM and RENNY pt has a seat in the community for HD on 10/2/18, plan is for the patient to be dialyzed T TH sat, so pt will need HD on 9/29/18 prior to being discharged so pt can c/w HD as an outpt to be started at above date.

## 2018-09-29 VITALS
DIASTOLIC BLOOD PRESSURE: 92 MMHG | RESPIRATION RATE: 18 BRPM | SYSTOLIC BLOOD PRESSURE: 154 MMHG | TEMPERATURE: 99 F | HEART RATE: 82 BPM

## 2018-09-29 LAB
ANION GAP SERPL CALC-SCNC: 16 MMOL/L — SIGNIFICANT CHANGE UP (ref 5–17)
BASOPHILS # BLD AUTO: 0 K/UL — SIGNIFICANT CHANGE UP (ref 0–0.2)
BASOPHILS NFR BLD AUTO: 0.3 % — SIGNIFICANT CHANGE UP (ref 0–2)
BUN SERPL-MCNC: 64 MG/DL — HIGH (ref 8–20)
CALCIUM SERPL-MCNC: 9 MG/DL — SIGNIFICANT CHANGE UP (ref 8.6–10.2)
CHLORIDE SERPL-SCNC: 98 MMOL/L — SIGNIFICANT CHANGE UP (ref 98–107)
CO2 SERPL-SCNC: 19 MMOL/L — LOW (ref 22–29)
CREAT SERPL-MCNC: 6.47 MG/DL — HIGH (ref 0.5–1.3)
EOSINOPHIL # BLD AUTO: 0 K/UL — SIGNIFICANT CHANGE UP (ref 0–0.5)
EOSINOPHIL NFR BLD AUTO: 0 % — SIGNIFICANT CHANGE UP (ref 0–6)
GLUCOSE SERPL-MCNC: 146 MG/DL — HIGH (ref 70–115)
HCT VFR BLD CALC: 31.3 % — LOW (ref 37–47)
HGB BLD-MCNC: 9.6 G/DL — LOW (ref 12–16)
LYMPHOCYTES # BLD AUTO: 1.2 K/UL — SIGNIFICANT CHANGE UP (ref 1–4.8)
LYMPHOCYTES # BLD AUTO: 18.2 % — LOW (ref 20–55)
MAGNESIUM SERPL-MCNC: 2.4 MG/DL — SIGNIFICANT CHANGE UP (ref 1.6–2.6)
MCHC RBC-ENTMCNC: 26.8 PG — LOW (ref 27–31)
MCHC RBC-ENTMCNC: 30.7 G/DL — LOW (ref 32–36)
MCV RBC AUTO: 87.4 FL — SIGNIFICANT CHANGE UP (ref 81–99)
MONOCYTES # BLD AUTO: 0.8 K/UL — SIGNIFICANT CHANGE UP (ref 0–0.8)
MONOCYTES NFR BLD AUTO: 11.5 % — HIGH (ref 3–10)
NEUTROPHILS # BLD AUTO: 4.6 K/UL — SIGNIFICANT CHANGE UP (ref 1.8–8)
NEUTROPHILS NFR BLD AUTO: 69.1 % — SIGNIFICANT CHANGE UP (ref 37–73)
PHOSPHATE SERPL-MCNC: 4.5 MG/DL — SIGNIFICANT CHANGE UP (ref 2.4–4.7)
PLATELET # BLD AUTO: 122 K/UL — LOW (ref 150–400)
POTASSIUM SERPL-MCNC: 4.4 MMOL/L — SIGNIFICANT CHANGE UP (ref 3.5–5.3)
POTASSIUM SERPL-SCNC: 4.4 MMOL/L — SIGNIFICANT CHANGE UP (ref 3.5–5.3)
RBC # BLD: 3.58 M/UL — LOW (ref 4.4–5.2)
RBC # FLD: 15.7 % — HIGH (ref 11–15.6)
SODIUM SERPL-SCNC: 133 MMOL/L — LOW (ref 135–145)
WBC # BLD: 6.7 K/UL — SIGNIFICANT CHANGE UP (ref 4.8–10.8)
WBC # FLD AUTO: 6.7 K/UL — SIGNIFICANT CHANGE UP (ref 4.8–10.8)

## 2018-09-29 PROCEDURE — 86704 HEP B CORE ANTIBODY TOTAL: CPT

## 2018-09-29 PROCEDURE — 86850 RBC ANTIBODY SCREEN: CPT

## 2018-09-29 PROCEDURE — T1013: CPT

## 2018-09-29 PROCEDURE — 93005 ELECTROCARDIOGRAM TRACING: CPT

## 2018-09-29 PROCEDURE — 81025 URINE PREGNANCY TEST: CPT

## 2018-09-29 PROCEDURE — 86480 TB TEST CELL IMMUN MEASURE: CPT

## 2018-09-29 PROCEDURE — 99285 EMERGENCY DEPT VISIT HI MDM: CPT

## 2018-09-29 PROCEDURE — G0365: CPT

## 2018-09-29 PROCEDURE — 80053 COMPREHEN METABOLIC PANEL: CPT

## 2018-09-29 PROCEDURE — 83735 ASSAY OF MAGNESIUM: CPT

## 2018-09-29 PROCEDURE — 85610 PROTHROMBIN TIME: CPT

## 2018-09-29 PROCEDURE — 86706 HEP B SURFACE ANTIBODY: CPT

## 2018-09-29 PROCEDURE — 99239 HOSP IP/OBS DSCHRG MGMT >30: CPT

## 2018-09-29 PROCEDURE — 90937 HEMODIALYSIS REPEATED EVAL: CPT

## 2018-09-29 PROCEDURE — 87340 HEPATITIS B SURFACE AG IA: CPT

## 2018-09-29 PROCEDURE — 86803 HEPATITIS C AB TEST: CPT

## 2018-09-29 PROCEDURE — 36558 INSERT TUNNELED CV CATH: CPT

## 2018-09-29 PROCEDURE — 99261: CPT

## 2018-09-29 PROCEDURE — 86900 BLOOD TYPING SEROLOGIC ABO: CPT

## 2018-09-29 PROCEDURE — 86705 HEP B CORE ANTIBODY IGM: CPT

## 2018-09-29 PROCEDURE — 81001 URINALYSIS AUTO W/SCOPE: CPT

## 2018-09-29 PROCEDURE — 85027 COMPLETE CBC AUTOMATED: CPT

## 2018-09-29 PROCEDURE — 86901 BLOOD TYPING SEROLOGIC RH(D): CPT

## 2018-09-29 PROCEDURE — 71046 X-RAY EXAM CHEST 2 VIEWS: CPT

## 2018-09-29 PROCEDURE — 80048 BASIC METABOLIC PNL TOTAL CA: CPT

## 2018-09-29 PROCEDURE — 85730 THROMBOPLASTIN TIME PARTIAL: CPT

## 2018-09-29 PROCEDURE — C1769: CPT

## 2018-09-29 PROCEDURE — 36415 COLL VENOUS BLD VENIPUNCTURE: CPT

## 2018-09-29 PROCEDURE — 84100 ASSAY OF PHOSPHORUS: CPT

## 2018-09-29 RX ORDER — ERYTHROPOIETIN 10000 [IU]/ML
0 INJECTION, SOLUTION INTRAVENOUS; SUBCUTANEOUS
Qty: 0 | Refills: 0 | COMMUNITY
Start: 2018-09-29

## 2018-09-29 RX ORDER — SEVELAMER CARBONATE 2400 MG/1
1 POWDER, FOR SUSPENSION ORAL
Qty: 0 | Refills: 0 | COMMUNITY
Start: 2018-09-29

## 2018-09-29 RX ADMIN — Medication 100 MILLIGRAM(S): at 06:23

## 2018-09-29 RX ADMIN — ERYTHROPOIETIN 6000 UNIT(S): 10000 INJECTION, SOLUTION INTRAVENOUS; SUBCUTANEOUS at 10:43

## 2018-09-29 RX ADMIN — SEVELAMER CARBONATE 800 MILLIGRAM(S): 2400 POWDER, FOR SUSPENSION ORAL at 06:23

## 2018-09-29 RX ADMIN — SEVELAMER CARBONATE 800 MILLIGRAM(S): 2400 POWDER, FOR SUSPENSION ORAL at 13:11

## 2018-09-29 RX ADMIN — ERGOCALCIFEROL 50000 UNIT(S): 1.25 CAPSULE ORAL at 13:12

## 2018-09-29 NOTE — PROGRESS NOTE ADULT - ATTENDING COMMENTS
Patient seen and examined at the bedside. Agree with the above history, physical, assessment, and plan with the necessary amendments/elaborations below:    clinically stable. renal function still with failure. not anticipated to make improvement on her own.     Goals of Care discussed at length with the patient. This conversation included current condition, prognosis, and options for therapy. Discussed desires by patient for further care and wishes were expressed - curative measures desired. Although she is still hesitant regarding HD, she understands that her renal function is not likely to improve without it and is willing to try it. She has already discussed with her family and feels comfortable with this decision and is not pressured by med staff to make it, she understands it was her decision. explained process and potential outcomes. Family present at the bedside. will start HD tomorrow after access established. Length of Conversation ~35min.
D/w CM and RENNY pt has a seat in the community for HD on 10/2/18, plan is for the patient to be dialyzed T TH sat,   Patient tolerated HD today prior to discharge. Patient hemodynamically stable. VS stable. Rt upper chest cath in place.   patient to follow up with nephrology and vascular as op. Plan of care has been explained in detail to the patient in Citizen of [place]. she verbalizes understanding.
Note addended where needed
Note addended where needed
Patient seen and examined at the bedside. Agree with the above history, physical, assessment, and plan with the necessary amendments/elaborations below:    clinically stable. tolerating HD. schedule per nephro.    Goals of Care discussed at length with the patient. This conversation included current condition, prognosis, and options for therapy. Discussed desires by patient for further care and wishes were expressed - curative measures desired but she is very hesitant to have AVF placement performed. Discussed at length the procedure process, the reasons for it, and the potential barriers in terms of HD placement in the community if the fistula is not placed. She remains ambivalent, on one hand willing, but hesitant due to her family's rejection for the need of graft placement. Will continue to discuss in detail. Family present at the bedside, sister. Length of Conversation ~35min.
Note addended where needed
Note addended where needed
Patient seen and examined at the bedside. Agree with the above history, physical, assessment, and plan with the necessary amendments/elaborations already made above.     Goals of Care discussed at length with the patient again. This conversation included current condition, prognosis, and options for therapy. Discussed desires by patient for further care and wishes were expressed - mainly discussed the necessity for AV Fistula. She states that her  and sister have instructed her not to have fistula placed in hopes that renal function will return within next several months and long term HD will not be needed. Medical necessity and options discussed, she is aware that she may never have a full return of renal function but that also if she truly wanted, fistula could be removed in the event that she did have it placed. She is aware that not proceeding with fistula may affect her ability to find placement in a community HD seat.  NO Family present at the bedside. Length of Conversation ~35min.
Patient seen and examined at the bedside. Agree with the above history, physical, assessment, and plan with the necessary amendments/elaborations already made above.
Patient seen and examined at the bedside. Agree with the above history, physical, assessment, and plan with the necessary amendments/elaborations below:    clinically stable. tolerating HD. will make arrangements for further care.
Note addended where needed

## 2018-09-29 NOTE — PROGRESS NOTE ADULT - SUBJECTIVE AND OBJECTIVE BOX
Patient is a 33y old  Female who presents with a chief complaint of edema of lower extremities (28 Sep 2018 16:40) and admitted with acute renal failure requiring hemodialysis     Interval events: [place]ic speaking   Patient examined at bedside. No acute events overnight. Patient states she is doing well, had a few questions about her follow up appointments and wound care instructions and now understands. She is currently getting dialysis and is comfortable. Patient is eating w/o nausea, vomiting. Patient is ambulating. Patient is voiding. Pt had a BM last night.    ROS: Denies fever, chest pain, SOB, abdominal pain, diarrhea, constipation, calf pain.      Vital Signs Last 24 Hrs  T(C): 37 (29 Sep 2018 09:15), Max: 37 (29 Sep 2018 09:15)  T(F): 98.6 (29 Sep 2018 09:15), Max: 98.6 (29 Sep 2018 09:15)  HR: 72 (29 Sep 2018 09:15) (65 - 77)  BP: 139/75 (29 Sep 2018 09:15) (99/55 - 139/75)  RR: 16 (29 Sep 2018 09:15) (12 - 20)  SpO2: 97% (28 Sep 2018 20:13) (97% - 100%)    Physical Exam:   GENERAL: NAD, well-groomed, well-developed. Thin Cooperative.   HEAD:  Atraumatic, Normocephalic  EYES: EOMI, PERRLA, conjunctiva and sclera clear.    NECK: Supple, No JVD  NEURO: Alert & Oriented X3, Good concentration; Motor Strength 5/5 B/L upper and lower extremities;   RESP: Clear to auscultation bilaterally; No rales, rhonchi, wheezing, or rubs     Rt sided neck CVC access, C/D/I   CVS: Regular rate and rhythm; No murmurs, rubs, or gallops. Capillary refill <2s  GI: Soft, Nontender, Nondistended; Bowel sounds present  EXTREMITIES:  2+ Peripheral Pulses, No clubbing, cyanosis, or edema. AVF placed on Left arm.      LABS                                                       10.0   4.4   )-----------( 159      ( 28 Sep 2018 12:33 )             32.7     09-29    133<L>  |  98  |  64.0<H>  ----------------------------<  146<H>  4.4   |  19.0<L>  |  6.47<H>    Ca    9.0      29 Sep 2018 10:28  Phos  4.5     09-29  Mg     2.4     09-29        Radiology  _______________________________________________________________    US Vessel Mapping Hemodialysis (09.19.18 @ 17:48)   IMPRESSION:  NO EVIDENCE OF DEEP VEIN THROMBOSIS.  CEPHALIC AND BASILIC MEASUREMENTS AS DESCRIBED    MEDICATIONS  (STANDING):  carvedilol 25 milliGRAM(s) Oral every 12 hours  docusate sodium 100 milliGRAM(s) Oral two times a day  epoetin brianda Injectable 6000 Unit(s) IV Push <User Schedule>  ergocalciferol 36457 Unit(s) Oral every week  influenza   Vaccine 0.5 milliLiter(s) IntraMuscular once  sevelamer hydrochloride 800 milliGRAM(s) Oral three times a day    MEDICATIONS  (PRN):  acetaminophen   Tablet .. 650 milliGRAM(s) Oral every 6 hours PRN Mild Pain (1 - 3), Moderate Pain (4 - 6)  ondansetron    Tablet 4 milliGRAM(s) Oral every 8 hours PRN Nausea and/or Vomiting  oxyCODONE    IR 5 milliGRAM(s) Oral every 6 hours PRN Severe Pain (7 - 10)

## 2018-09-29 NOTE — PROGRESS NOTE ADULT - ASSESSMENT
1. ESRD on HD  2. Anemia of Renal Disease  3. HTN  4. MBD renal fx      Kidney bx during recent admission showed sclerotic changes, Features collapsing FSGS in the setting of mildly elevated dsDNA antibody and decreased C3  S/P CVC access 09/20/2018, AVF L on 9/28  on HD today ; plan for [place] [facility] starting Tuesday 8:45pm    tyrell sanderson and Dr. Fernandez

## 2018-09-29 NOTE — PROGRESS NOTE ADULT - PROVIDER SPECIALTY LIST ADULT
Anesthesia
Cardiology
Family Medicine
Nephrology
Palliative Care
Vascular Surgery
Nephrology
Family Medicine

## 2018-09-29 NOTE — PROGRESS NOTE ADULT - SUBJECTIVE AND OBJECTIVE BOX
32 y/o female , ESRD ( FSGS - Collapsing Features )    POD#0 s/p creation of left radiocephalic AVF. Pt tolerated procedure well. Remains HD stable, LUE neurovascularly intact  - Continue HD via R IJ- CVC,  - HD scheduled, in AM,  - No IVs or blood draws from LUE  - Monitor AVF site and neurovascular  status of LUE  - Pain control PRN  - Remainder of care as per primary team.    JEROME Szymanski,    HD in AM,

## 2018-09-29 NOTE — PROGRESS NOTE ADULT - REASON FOR ADMISSION
edema of lower extremities
Dyspnea
edema of lower extremities
BLE Edema
edema of lower extremities

## 2018-09-29 NOTE — PROGRESS NOTE ADULT - SUBJECTIVE AND OBJECTIVE BOX
HPI/OVERNIGHT EVENTS: No acute events overnight. Patient tolerated HD via Right IJ permacath today w/o complications. Denies nausea, vomiting, fever, chest pain, ext pain, or SOB.    MEDICATIONS  (STANDING):  carvedilol 25 milliGRAM(s) Oral every 12 hours  docusate sodium 100 milliGRAM(s) Oral two times a day  epoetin brianda Injectable 6000 Unit(s) IV Push <User Schedule>  ergocalciferol 55825 Unit(s) Oral every week  influenza   Vaccine 0.5 milliLiter(s) IntraMuscular once  sevelamer hydrochloride 800 milliGRAM(s) Oral three times a day    MEDICATIONS  (PRN):  acetaminophen   Tablet .. 650 milliGRAM(s) Oral every 6 hours PRN Mild Pain (1 - 3), Moderate Pain (4 - 6)  ondansetron    Tablet 4 milliGRAM(s) Oral every 8 hours PRN Nausea and/or Vomiting  oxyCODONE    IR 5 milliGRAM(s) Oral every 6 hours PRN Severe Pain (7 - 10)      Vital Signs Last 24 Hrs  T(C): 37.3 (29 Sep 2018 17:08), Max: 37.3 (29 Sep 2018 12:40)  T(F): 99.2 (29 Sep 2018 17:08), Max: 99.2 (29 Sep 2018 12:40)  HR: 82 (29 Sep 2018 17:08) (72 - 85)  BP: 154/92 (29 Sep 2018 17:08) (99/55 - 154/92)  BP(mean): --  RR: 18 (29 Sep 2018 17:08) (16 - 18)  SpO2: --    Gen: AAO x3, NAD  Pulm: RIJ permacath site c/d/i. Without evidence of infection. CTAB, Symmetrical chest rise  CV: RRR, Rt IJ permacath in place  Abd: Soft, NT, ND, -R/-G  Ext: No C/C/E, LT radiocephalic AVF with palpable thrill  Vasc: Extremity warm. <2 sec capillary refill. 2+ Radial, Femoral Popliteal, DP/PT pulses  Neuro: No focal neurological deficits      I&O's Detail    29 Sep 2018 07:01  -  29 Sep 2018 20:16  --------------------------------------------------------  IN:    Oral Fluid: 360 mL  Total IN: 360 mL    OUT:    Other: 500 mL  Total OUT: 500 mL    Total NET: -140 mL          LABS:                        9.6    6.7   )-----------( 122      ( 29 Sep 2018 10:28 )             31.3     09-29    133<L>  |  98  |  64.0<H>  ----------------------------<  146<H>  4.4   |  19.0<L>  |  6.47<H>    Ca    9.0      29 Sep 2018 10:28  Phos  4.5     09-29  Mg     2.4     09-29      PT/INR - ( 28 Sep 2018 09:28 )   PT: 10.1 sec;   INR: 0.92 ratio

## 2018-09-29 NOTE — PROGRESS NOTE ADULT - ASSESSMENT
33 y.o. female with h/o migraines, elevated prolactin (ammenohrea) and HTN, recently d/c from [facility] on 9/4/18 with dx of acute on chronic renal failure had bx performed which resulted as FSGS; pt currently presented with worsening peripheral edema/ fluid overload and was noted to have worsening kidney dysfunction with metabolic acidosis with hyperkalemia. Pt was admitted with fluid overload and uremia requiring urgent HD. Pt had PC placed and was dialyzed, was being dialyzed in the hospital with improvement in all labs and sx. Nephrology continues to follow the patient. Vein mapping was completed and vascular consulted and pt is for AVF placement today. Pt was dialyzed today and be discharged home to  resume HD in the community T TH Sat.       Acute on chronic renal failure with Uremia/ Fluid overload now ESRD requiring hemodialysis   ·	Patient had Kidney bx during last recent admission that shows sclerotic changes concerning of collapsing FSGS in setting of mildly elevated dsDNA antibody and decreased C3  ·	stable labs since admission   ·	S/P CVC access 09/20/2018  ·	c/w calcitriol 0.5 MICROGram(s) Oral daily and ergocalciferol 83876 Unit(s) Oral every week  ·	Pt known to have ppd positive, quantiferon negative and cxr negative   -hepatitis panel negative   ·	Vein mapping completed   ·	Pt currently NPO  - AVF to be placed today   ·	-c/w regularly scheduled HD T TH Sat   ·	Nephro f/u noted and appreciated  ·	Vascular surgery f/u noted and appreciated  ·	Patient is medically optimized for planned procedure. Pt has >4 mets score, ekg nsr and tte with preserved EF.   ·	Pt received dialysis today. Can be discharged to f/u in the community with HD T TH Sat (arranged by CM and SW).  ·	Pending vascular sx to examine new AVF    Anemia of chronic kidney disease  ·	stable  ·	baseline Hgb 9-10, w/o change from last admission.  ·	hx of transfusion of prbc during last admission due to post renal bx hemorrhage   ·	Epogen if hgb drops below 10 as per nephro     Hypertension  ·	Stable  ·	goal BP <140/90, controlled  ·	c/w carvedilol 25 milliGRAM(s) Oral every 12 hours    Migraines  ·	stable, Asymptomatic    Elevated prolactin with amenorrhea   -Noted on prior hospitalizations  - pt advised to f/u with endocrine and OB GYN as an outpt     Moderate protein calorie malnutrition   ·	c/w nephro po bid   ·	nutrition consult noted and appreciated     Prophylactic measures  ·	Holding AC due to pending surgery today and should be restarted tomorrow - heparin sq     Advanced Directives: Full Code  HCP: Patients spouse     Disposition:  D/w CM and RENNY pt has a seat in the community for HD on 10/2/18, plan is for the patient to be dialyzed T TH sat, so pt will need HD on 9/29/18 prior to being discharged so pt can c/w HD as an outpt to be started at above date. 33 y.o. female with h/o migraines, elevated prolactin (ammenohrea) and HTN, recently d/c from [facility] on 9/4/18 with dx of acute on chronic renal failure had bx performed which resulted as FSGS; pt currently presented with worsening peripheral edema/ fluid overload and was noted to have worsening kidney dysfunction with metabolic acidosis with hyperkalemia. Pt was admitted with fluid overload and uremia requiring urgent HD. Pt had PC placed and was dialyzed, was being dialyzed in the hospital with improvement in all labs and sx. Nephrology continues to follow the patient. Vein mapping was completed and vascular consulted and pt is for AVF placement today. Pt was dialyzed today and be discharged home to  resume HD in the community T TH Sat.       Acute on chronic renal failure with Uremia/ Fluid overload now ESRD requiring hemodialysis   ·	Patient had Kidney bx during last recent admission that shows sclerotic changes concerning of collapsing FSGS in setting of mildly elevated dsDNA antibody and decreased C3  ·	stable labs since admission   ·	S/P CVC access 09/20/2018  ·	c/w calcitriol 0.5 MICROGram(s) Oral daily and ergocalciferol 85679 Unit(s) Oral every week  ·	Pt known to have ppd positive, quantiferon negative and cxr negative   -hepatitis panel negative   ·	Vein mapping completed   ·	Pt currently NPO  - AVF to be placed today   ·	-c/w regularly scheduled HD T TH Sat   ·	Nephro f/u noted and appreciated  ·	Vascular surgery f/u noted and appreciated  ·	Patient is medically optimized for planned procedure. Pt has >4 mets score, ekg nsr and tte with preserved EF.   ·	Pt received dialysis today. Can be discharged to f/u in the community with HD T TH Sat (arranged by CM and SW).  ·	Pending vascular sx to examine new AVF    Anemia of chronic kidney disease  ·	stable  ·	baseline Hgb 9-10, w/o change from last admission.  ·	hx of transfusion of prbc during last admission due to post renal bx hemorrhage   ·	Epogen if hgb drops below 10 as per nephro     Hypertension  ·	Stable  ·	goal BP <140/90, controlled  ·	c/w carvedilol 25 milliGRAM(s) Oral every 12 hours    Migraines  ·	stable, Asymptomatic    Elevated prolactin with amenorrhea   -Noted on prior hospitalizations  - pt advised to f/u with endocrine and OB GYN as an outpt     Moderate protein calorie malnutrition   ·	c/w nephro po bid   ·	nutrition consult noted and appreciated     Prophylactic measures  ·	Holding AC due to pending surgery today and should be restarted tomorrow - heparin sq     Advanced Directives: Full Code  HCP: Patients spouse     Disposition:  D/w CM and RENNY pt has a seat in the community for HD on 10/2/18, plan is for the patient to be dialyzed T TH sat,   Patient tolerated HD today prior to discharge. Patient hemodynamically stable

## 2018-09-29 NOTE — PROGRESS NOTE ADULT - SUBJECTIVE AND OBJECTIVE BOX
[facility] DIVISION OF KIDNEY DISEASES AND HYPERTENSION -- FOLLOW UP NOTE  --------------------------------------------------------------------------------  Chief Complaint: ESRD HD    24 hour events/subjective:  Pt seen and examined  on HD today  s/p AVF 9/28    PAST HISTORY  --------------------------------------------------------------------------------  No significant changes to PMH, PSH, FHx, SHx, unless otherwise noted    ALLERGIES & MEDICATIONS  --------------------------------------------------------------------------------  Allergies    No Known Allergies    Intolerances      Standing Inpatient Medications  carvedilol 25 milliGRAM(s) Oral every 12 hours  docusate sodium 100 milliGRAM(s) Oral two times a day  epoetin brianda Injectable 6000 Unit(s) IV Push <User Schedule>  ergocalciferol 77159 Unit(s) Oral every week  influenza   Vaccine 0.5 milliLiter(s) IntraMuscular once  sevelamer hydrochloride 800 milliGRAM(s) Oral three times a day    PRN Inpatient Medications  acetaminophen   Tablet .. 650 milliGRAM(s) Oral every 6 hours PRN  ondansetron    Tablet 4 milliGRAM(s) Oral every 8 hours PRN  oxyCODONE    IR 5 milliGRAM(s) Oral every 6 hours PRN      REVIEW OF SYSTEMS  --------------------------------------------------------------------------------  Gen: No weight changes, fatigue, fevers/chills, weakness  Skin: No rashes  Head/Eyes/Ears/Mouth: No headache; Normal hearing; Normal vision w/o blurriness; No sinus pain/discomfort, sore throat  Respiratory: No dyspnea, cough, wheezing, hemoptysis  CV: No chest pain, PND, orthopnea  GI: No abdominal pain, diarrhea, constipation, nausea, vomiting, melena, hematochezia  : No increased frequency, dysuria, hematuria, nocturia  MSK: No joint pain/swelling; no back pain; no edema  Neuro: No dizziness/lightheadedness, weakness, seizures, numbness, tingling  Heme: No easy bruising or bleeding  Endo: No heat/cold intolerance  Psych: No significant nervousness, anxiety, stress, depression    All other systems were reviewed and are negative, except as noted.    VITALS/PHYSICAL EXAM  --------------------------------------------------------------------------------  T(C): 37.3 (09-29-18 @ 12:40), Max: 37.3 (09-29-18 @ 12:40)  HR: 85 (09-29-18 @ 12:40) (65 - 85)  BP: 154/83 (09-29-18 @ 12:40) (99/55 - 154/83)  RR: 16 (09-29-18 @ 12:40) (12 - 20)  SpO2: 97% (09-28-18 @ 20:13) (97% - 100%)  Wt(kg): --        09-29-18 @ 07:01  -  09-29-18 @ 14:17  --------------------------------------------------------  IN: 0 mL / OUT: 500 mL / NET: -500 mL      Physical Exam:  	Gen: NAD, well-appearing  	HEENT: PERRL, supple neck, clear oropharynx  	Pulm: CTA B/L  	CV: RRR, S1S2; no rub  	Back: No spinal or CVA tenderness; no sacral edema  	Abd: +BS, soft, nontender/nondistended  	: No suprapubic tenderness  	UE: Warm, FROM, no clubbing, intact strength; no edema; no asterixis  	LE: Warm, FROM, no clubbing, intact strength; no edema  	Neuro: No focal deficits, intact gait  	Psych: Normal affect and mood  	Skin: Warm, without rashes  	Vascular access: L AVF ; tunneled CVC    LABS/STUDIES  --------------------------------------------------------------------------------              9.6    6.7   >-----------<  122      [09-29-18 @ 10:28]              31.3     133  |  98  |  64.0  ----------------------------<  146      [09-29-18 @ 10:28]  4.4   |  19.0  |  6.47        Ca     9.0     [09-29-18 @ 10:28]      Mg     2.4     [09-29-18 @ 10:28]      Phos  4.5     [09-29-18 @ 10:28]      PT/INR: PT 10.1 , INR 0.92       [09-28-18 @ 09:28]      Creatinine Trend:  SCr 6.47 [09-29 @ 10:28]  SCr 5.09 [09-28 @ 09:18]  SCr 6.28 [09-27 @ 07:50]  SCr 5.16 [09-26 @ 08:48]  SCr 7.46 [09-25 @ 08:20]    Urinalysis - [09-18-18 @ 14:01]      Color Yellow / Appearance Clear / SG 1.010 / pH 6.0      Gluc Negative / Ketone Negative  / Bili Negative / Urobili Negative       Blood Moderate / Protein 500 / Leuk Est Negative / Nitrite Negative      RBC 3-5 / WBC 3-5 / Hyaline  / Gran  / Sq Epi  / Non Sq Epi Occasional / Bacteria       Iron 45, TIBC 209, %sat 19      [08-31-18 @ 06:22]  Ferritin 58      [08-31-18 @ 06:22]  PTH -- (Ca 8.8)      [09-02-18 @ 14:18]   206  PTH -- (Ca 8.8)      [09-01-18 @ 19:52]   181  HbA1c 5.2      [08-31-18 @ 06:22]  TSH 2.15      [08-31-18 @ 06:22]  Lipid: chol 97, TG 66, HDL 39, LDL 45      [08-31-18 @ 06:22]    HBsAb <3.0      [09-21-18 @ 19:57]  HBsAb Nonreact      [09-01-18 @ 20:51]  HBsAg Nonreact      [09-21-18 @ 19:57]  HBcAb Nonreact      [09-21-18 @ 19:57]  HCV 0.10, Nonreact      [09-21-18 @ 19:57]    ALBA: titer 1:160, pattern Homogeneous      [09-01-18 @ 20:54]  dsDNA 33      [09-01-18 @ 20:54]  C3 Complement 73      [09-01-18 @ 20:12]  C4 Complement 17      [09-01-18 @ 19:52]  ANCA: cANCA Negative, pANCA Negative, atypical ANCA Indeterminate Method interference due to ALBA fluorescence      [09-01-18 @ 20:54]  anti-GBM <0.2      [09-01-18 @ 23:27]  Syphilis Screen (Treponema Pallidum Ab) Negative      [09-01-18 @ 20:54]  PLA2R: SUMAYA 3, IFA Negative      [09-01-18 @ 23:27]  Free Light Chains: kappa 13.37, lambda 15.70, ratio = 0.85      [09-01 @ 19:52]  Immunofixation Serum:   No Monoclonal Band Identified      [09-01-18 @ 19:52]

## 2018-09-29 NOTE — PROGRESS NOTE ADULT - ASSESSMENT
34 y/o F s/p Lt radiocephalic AVF (POD #1). Patient tolerated HD via Right IJ permacath today w/o complications.    Plan:  - Next HD tuesday 10/2  - Pain Control  - rest of care per primary team

## 2018-10-22 ENCOUNTER — APPOINTMENT (OUTPATIENT)
Dept: NEPHROLOGY | Facility: CLINIC | Age: 33
End: 2018-10-22

## 2018-11-12 PROBLEM — E22.1 HYPERPROLACTINEMIA: Chronic | Status: ACTIVE | Noted: 2018-09-18

## 2018-11-12 PROBLEM — N18.5 CHRONIC KIDNEY DISEASE, STAGE 5: Chronic | Status: ACTIVE | Noted: 2018-09-18

## 2018-11-16 ENCOUNTER — APPOINTMENT (OUTPATIENT)
Dept: VASCULAR SURGERY | Facility: CLINIC | Age: 33
End: 2018-11-16
Payer: MEDICAID

## 2018-11-16 VITALS
TEMPERATURE: 98.1 F | BODY MASS INDEX: 20.06 KG/M2 | OXYGEN SATURATION: 100 % | HEIGHT: 62 IN | DIASTOLIC BLOOD PRESSURE: 78 MMHG | WEIGHT: 109 LBS | SYSTOLIC BLOOD PRESSURE: 120 MMHG

## 2018-11-16 PROCEDURE — 99024 POSTOP FOLLOW-UP VISIT: CPT

## 2018-11-16 PROCEDURE — 93931 UPPER EXTREMITY STUDY: CPT

## 2018-11-28 ENCOUNTER — EMERGENCY (EMERGENCY)
Facility: HOSPITAL | Age: 33
LOS: 1 days | Discharge: HOME | End: 2018-11-28
Attending: STUDENT IN AN ORGANIZED HEALTH CARE EDUCATION/TRAINING PROGRAM
Payer: MEDICAID

## 2018-11-28 VITALS
SYSTOLIC BLOOD PRESSURE: 136 MMHG | RESPIRATION RATE: 18 BRPM | WEIGHT: 115.08 LBS | DIASTOLIC BLOOD PRESSURE: 83 MMHG | HEART RATE: 79 BPM | OXYGEN SATURATION: 100 % | TEMPERATURE: 98 F | HEIGHT: 62 IN

## 2018-11-28 DIAGNOSIS — Z98.51 TUBAL LIGATION STATUS: Chronic | ICD-10-CM

## 2018-11-28 LAB
ALBUMIN SERPL ELPH-MCNC: 4.6 G/DL — SIGNIFICANT CHANGE UP (ref 3.3–5.2)
ALP SERPL-CCNC: 98 U/L — SIGNIFICANT CHANGE UP (ref 40–120)
ALT FLD-CCNC: 9 U/L — SIGNIFICANT CHANGE UP
ANION GAP SERPL CALC-SCNC: 13 MMOL/L — SIGNIFICANT CHANGE UP (ref 5–17)
ANISOCYTOSIS BLD QL: SLIGHT — SIGNIFICANT CHANGE UP
APTT BLD: 28.2 SEC — SIGNIFICANT CHANGE UP (ref 27.5–36.3)
AST SERPL-CCNC: 11 U/L — SIGNIFICANT CHANGE UP
BILIRUB SERPL-MCNC: 0.3 MG/DL — LOW (ref 0.4–2)
BUN SERPL-MCNC: 36 MG/DL — HIGH (ref 8–20)
CALCIUM SERPL-MCNC: 10.4 MG/DL — HIGH (ref 8.6–10.2)
CHLORIDE SERPL-SCNC: 100 MMOL/L — SIGNIFICANT CHANGE UP (ref 98–107)
CO2 SERPL-SCNC: 22 MMOL/L — SIGNIFICANT CHANGE UP (ref 22–29)
CREAT SERPL-MCNC: 5.3 MG/DL — HIGH (ref 0.5–1.3)
ELLIPTOCYTES BLD QL SMEAR: SLIGHT — SIGNIFICANT CHANGE UP
EOSINOPHIL NFR BLD AUTO: 3 % — SIGNIFICANT CHANGE UP (ref 0–5)
GLUCOSE SERPL-MCNC: 87 MG/DL — SIGNIFICANT CHANGE UP (ref 70–115)
HCG SERPL-ACNC: <4 MIU/ML — SIGNIFICANT CHANGE UP
HCT VFR BLD CALC: 38.3 % — SIGNIFICANT CHANGE UP (ref 37–47)
HGB BLD-MCNC: 12.8 G/DL — SIGNIFICANT CHANGE UP (ref 12–16)
INR BLD: 1.01 RATIO — SIGNIFICANT CHANGE UP (ref 0.88–1.16)
LYMPHOCYTES # BLD AUTO: 32 % — SIGNIFICANT CHANGE UP (ref 20–55)
MACROCYTES BLD QL: SLIGHT — SIGNIFICANT CHANGE UP
MCHC RBC-ENTMCNC: 29.2 PG — SIGNIFICANT CHANGE UP (ref 27–31)
MCHC RBC-ENTMCNC: 33.4 G/DL — SIGNIFICANT CHANGE UP (ref 32–36)
MCV RBC AUTO: 87.2 FL — SIGNIFICANT CHANGE UP (ref 81–99)
MICROCYTES BLD QL: SLIGHT — SIGNIFICANT CHANGE UP
MONOCYTES NFR BLD AUTO: 8 % — SIGNIFICANT CHANGE UP (ref 3–10)
NEUTROPHILS NFR BLD AUTO: 57 % — SIGNIFICANT CHANGE UP (ref 37–73)
OVALOCYTES BLD QL SMEAR: SLIGHT — SIGNIFICANT CHANGE UP
PLAT MORPH BLD: NORMAL — SIGNIFICANT CHANGE UP
PLATELET # BLD AUTO: 160 K/UL — SIGNIFICANT CHANGE UP (ref 150–400)
POIKILOCYTOSIS BLD QL AUTO: SLIGHT — SIGNIFICANT CHANGE UP
POTASSIUM SERPL-MCNC: 5.1 MMOL/L — SIGNIFICANT CHANGE UP (ref 3.5–5.3)
POTASSIUM SERPL-SCNC: 5.1 MMOL/L — SIGNIFICANT CHANGE UP (ref 3.5–5.3)
PROT SERPL-MCNC: 8.5 G/DL — SIGNIFICANT CHANGE UP (ref 6.6–8.7)
PROTHROM AB SERPL-ACNC: 11.6 SEC — SIGNIFICANT CHANGE UP (ref 10–12.9)
RBC # BLD: 4.39 M/UL — LOW (ref 4.4–5.2)
RBC # FLD: 16.5 % — HIGH (ref 11–15.6)
RBC BLD AUTO: ABNORMAL
SODIUM SERPL-SCNC: 135 MMOL/L — SIGNIFICANT CHANGE UP (ref 135–145)
WBC # BLD: 6.4 K/UL — SIGNIFICANT CHANGE UP (ref 4.8–10.8)
WBC # FLD AUTO: 6.4 K/UL — SIGNIFICANT CHANGE UP (ref 4.8–10.8)

## 2018-11-28 PROCEDURE — 99284 EMERGENCY DEPT VISIT MOD MDM: CPT

## 2018-11-28 NOTE — ED ADULT NURSE NOTE - OBJECTIVE STATEMENT
Pt. sent to ED by Vascular Surgeon for complications with creating left AV fistula. Right chest wall permi-cath in place

## 2018-11-28 NOTE — ED STATDOCS - PROGRESS NOTE DETAILS
32 y/o F pt with PMHx of HTN, kidney problems presents to the ED c/o evaluation for fistula. Sent in for vascular surgeon Dr. Hodge for possible admittance. No further complaints at this time. Patient will be moved to the main ED for further evaluation by another provider. 34 y/o F pt with PMHx of HTN, kidney problems presents to the ED c/o evaluation for fistula. Sent in for vascular surgeon Dr. Francisca Peacock for possible admittance. No further complaints at this time. Patient will be moved to the main ED for further evaluation by another provider.

## 2018-11-28 NOTE — ED ADULT TRIAGE NOTE - CHIEF COMPLAINT QUOTE
patient states has left AV Fistula, HD last yesterday states sent by MD dr Sanchez for evaluation of site with possible revision

## 2018-11-29 VITALS
OXYGEN SATURATION: 100 % | TEMPERATURE: 98 F | DIASTOLIC BLOOD PRESSURE: 79 MMHG | HEART RATE: 78 BPM | RESPIRATION RATE: 17 BRPM | SYSTOLIC BLOOD PRESSURE: 132 MMHG

## 2018-11-29 LAB
BLD GP AB SCN SERPL QL: SIGNIFICANT CHANGE UP
TYPE + AB SCN PNL BLD: SIGNIFICANT CHANGE UP

## 2018-11-29 PROCEDURE — 85730 THROMBOPLASTIN TIME PARTIAL: CPT

## 2018-11-29 PROCEDURE — 93010 ELECTROCARDIOGRAM REPORT: CPT

## 2018-11-29 PROCEDURE — 93005 ELECTROCARDIOGRAM TRACING: CPT

## 2018-11-29 PROCEDURE — 86901 BLOOD TYPING SEROLOGIC RH(D): CPT

## 2018-11-29 PROCEDURE — 71046 X-RAY EXAM CHEST 2 VIEWS: CPT

## 2018-11-29 PROCEDURE — 86900 BLOOD TYPING SEROLOGIC ABO: CPT

## 2018-11-29 PROCEDURE — 85027 COMPLETE CBC AUTOMATED: CPT

## 2018-11-29 PROCEDURE — 99283 EMERGENCY DEPT VISIT LOW MDM: CPT

## 2018-11-29 PROCEDURE — 85610 PROTHROMBIN TIME: CPT

## 2018-11-29 PROCEDURE — 84702 CHORIONIC GONADOTROPIN TEST: CPT

## 2018-11-29 PROCEDURE — 80053 COMPREHEN METABOLIC PANEL: CPT

## 2018-11-29 PROCEDURE — 71046 X-RAY EXAM CHEST 2 VIEWS: CPT | Mod: 26

## 2018-11-29 PROCEDURE — 36415 COLL VENOUS BLD VENIPUNCTURE: CPT

## 2018-11-29 PROCEDURE — 86850 RBC ANTIBODY SCREEN: CPT

## 2018-11-29 PROCEDURE — T1013: CPT

## 2018-11-29 NOTE — ED PROVIDER NOTE - OBJECTIVE STATEMENT
Patient is a 32 y/o male with a pmhx of HTN, kidney problems, presenting to the ED for evaluation of fistula. Patient currently sees dr. munson has fistula in left arm. Patient states she was instructed to report to the ED tonight for admittance for revision of left fistula. Patient reports she is having mild pain in site. Patient has HD on tues, thurs, sat, has not missed any sessions lately. Patient denies cp, SOB, fevers, abdominal pain, numbness or loss of sensation, decreased ROM in extremities.

## 2018-11-29 NOTE — ED PROVIDER NOTE - ATTENDING CONTRIBUTION TO CARE
34 yo female with left wrist pain whom presented to the ED believing she was going to surgical pre-op. I personally saw the patient with the PA, and completed the key components of the history and physical exam. I then discussed the management plan with the PA.

## 2018-11-29 NOTE — ED PROVIDER NOTE - PROGRESS NOTE DETAILS
Vascular consulted, miscommunication with patient, patient has surgery next week, was suppose to go for pre-surgical testing tomorrow outpatiently, will get blood work, ekg, chest x-ray tn for pre-surgical testing, follow up with vascular surgeon, copy of results printed, pt explained results and d/c instructions

## 2018-11-29 NOTE — CHART NOTE - NSCHARTNOTEFT_GEN_A_CORE
Vascular Surgery was called by to ED staff to for possible admittance to hospital for AV fistula revision which was to occur in AM. Per patient, she as instructed to report to ED tonight for admittance under Dr. Antoine. Attending was contacted; patient is schedule for surgery next week. Patient did produce paperwork that stated to report to hospital on 11/29/18; evidently paperwork had instructed patient to report for Pre-Surgical Testing on this janeth. Pt was advised to call Vascular Surgery office in AM to verify correct surgery date.

## 2018-11-29 NOTE — ED PROVIDER NOTE - PHYSICAL EXAMINATION
Const: Awake, alert and oriented. In no acute distress. Well appearing.  HEENT: NC/AT. Moist mucous membranes.  Eyes: Conjunctiva pink, sclera white bilaterally. EOMI.  Neck:. Soft and supple. Full ROM without pain.  Cardiac: +S1/S2. No murmurs.   Resp: Speaking in full sentences. No evidence of respiratory distress. No wheezes, rales or rhonchi.  Abd: Soft, non-tender, non-distended. Normal bowel sounds in all 4 quadrants. No guarding or rebound.  MSK: FROM in all extremities, neurovasculary intact, radial pulse 2+   Back: Spine midline and non-tender. No CVAT.  Skin: Left AV fistula in forearm in place no bruits or thrills, chest port noted   Lymph: No cervical lymphadenopathy.  Neuro: Awake, alert & oriented x 3. CN II-XII intact, finger to nose intact, neurovasculary intact, muscle strength fair, gait without ataxia

## 2018-12-03 ENCOUNTER — INPATIENT (INPATIENT)
Facility: HOSPITAL | Age: 33
LOS: 0 days | Discharge: HOME | DRG: 252 | End: 2018-12-04
Attending: SURGERY | Admitting: SURGERY
Payer: MEDICAID

## 2018-12-03 VITALS
TEMPERATURE: 98 F | OXYGEN SATURATION: 100 % | RESPIRATION RATE: 20 BRPM | DIASTOLIC BLOOD PRESSURE: 85 MMHG | WEIGHT: 108.03 LBS | HEART RATE: 81 BPM | HEIGHT: 61 IN | SYSTOLIC BLOOD PRESSURE: 133 MMHG

## 2018-12-03 DIAGNOSIS — Z98.51 TUBAL LIGATION STATUS: Chronic | ICD-10-CM

## 2018-12-03 LAB
ALBUMIN SERPL ELPH-MCNC: 4.2 G/DL — SIGNIFICANT CHANGE UP (ref 3.3–5.2)
ALP SERPL-CCNC: 92 U/L — SIGNIFICANT CHANGE UP (ref 40–120)
ALT FLD-CCNC: 7 U/L — SIGNIFICANT CHANGE UP
ANION GAP SERPL CALC-SCNC: 14 MMOL/L — SIGNIFICANT CHANGE UP (ref 5–17)
APTT BLD: 35 SEC — SIGNIFICANT CHANGE UP (ref 27.5–36.3)
AST SERPL-CCNC: 7 U/L — SIGNIFICANT CHANGE UP
BILIRUB SERPL-MCNC: 0.2 MG/DL — LOW (ref 0.4–2)
BLD GP AB SCN SERPL QL: SIGNIFICANT CHANGE UP
BUN SERPL-MCNC: 41 MG/DL — HIGH (ref 8–20)
CALCIUM SERPL-MCNC: 9.9 MG/DL — SIGNIFICANT CHANGE UP (ref 8.6–10.2)
CHLORIDE SERPL-SCNC: 99 MMOL/L — SIGNIFICANT CHANGE UP (ref 98–107)
CO2 SERPL-SCNC: 19 MMOL/L — LOW (ref 22–29)
CREAT SERPL-MCNC: 6.29 MG/DL — HIGH (ref 0.5–1.3)
GLUCOSE SERPL-MCNC: 129 MG/DL — HIGH (ref 70–115)
HCG SERPL-ACNC: <4 MIU/ML — SIGNIFICANT CHANGE UP
HCT VFR BLD CALC: 36.7 % — LOW (ref 37–47)
HGB BLD-MCNC: 12.2 G/DL — SIGNIFICANT CHANGE UP (ref 12–16)
INR BLD: 1.02 RATIO — SIGNIFICANT CHANGE UP (ref 0.88–1.16)
MCHC RBC-ENTMCNC: 29 PG — SIGNIFICANT CHANGE UP (ref 27–31)
MCHC RBC-ENTMCNC: 33.2 G/DL — SIGNIFICANT CHANGE UP (ref 32–36)
MCV RBC AUTO: 87.2 FL — SIGNIFICANT CHANGE UP (ref 81–99)
PLATELET # BLD AUTO: 141 K/UL — LOW (ref 150–400)
POTASSIUM SERPL-MCNC: 4.5 MMOL/L — SIGNIFICANT CHANGE UP (ref 3.5–5.3)
POTASSIUM SERPL-SCNC: 4.5 MMOL/L — SIGNIFICANT CHANGE UP (ref 3.5–5.3)
PROT SERPL-MCNC: 7.6 G/DL — SIGNIFICANT CHANGE UP (ref 6.6–8.7)
PROTHROM AB SERPL-ACNC: 11.7 SEC — SIGNIFICANT CHANGE UP (ref 10–12.9)
RBC # BLD: 4.21 M/UL — LOW (ref 4.4–5.2)
RBC # FLD: 16.9 % — HIGH (ref 11–15.6)
SODIUM SERPL-SCNC: 132 MMOL/L — LOW (ref 135–145)
TYPE + AB SCN PNL BLD: SIGNIFICANT CHANGE UP
WBC # BLD: 6.9 K/UL — SIGNIFICANT CHANGE UP (ref 4.8–10.8)
WBC # FLD AUTO: 6.9 K/UL — SIGNIFICANT CHANGE UP (ref 4.8–10.8)

## 2018-12-03 PROCEDURE — 99285 EMERGENCY DEPT VISIT HI MDM: CPT

## 2018-12-03 PROCEDURE — 71045 X-RAY EXAM CHEST 1 VIEW: CPT | Mod: 26

## 2018-12-03 NOTE — ED PROVIDER NOTE - OBJECTIVE STATEMENT
33 year old female with a h/o CKD on dialysis presents with c/o non-functioning fistula. pt was last dialyzed on saturday and will be admitted for fistulogram  with possible revision

## 2018-12-03 NOTE — ED PROVIDER NOTE - CARE PLAN
Principal Discharge DX:	Dialysis AV fistula malfunction, initial encounter  Secondary Diagnosis:	Stage 5 chronic kidney disease not on chronic dialysis

## 2018-12-03 NOTE — ED ADULT NURSE NOTE - NSIMPLEMENTINTERV_GEN_ALL_ED
Implemented All Universal Safety Interventions:  [place] to call system. Call bell, personal items and telephone within reach. Instruct patient to call for assistance. Room bathroom lighting operational. Non-slip footwear when patient is off stretcher. Physically safe environment: no spills, clutter or unnecessary equipment. Stretcher in lowest position, wheels locked, appropriate side rails in place.

## 2018-12-03 NOTE — ED STATDOCS - NS_ ATTENDINGSCRIBEDETAILS _ED_A_ED_FT
I, Shar Slater, performed the initial face to face bedside interview with this patient regarding history of present illness, review of symptoms and relevant past medical, social and family history.  I completed an independent physical examination.  I was the initial provider who evaluated this patient. I have signed out the follow up of any pending tests (i.e. labs, radiological studies) to the ACP.  I have communicated the patient’s plan of care and disposition with the ACP.  The history, relevant review of systems, past medical and surgical history, medical decision making, and physical examination was documented by the scribe in my presence and I attest to the accuracy of the documentation.

## 2018-12-03 NOTE — CONSULT NOTE ADULT - SUBJECTIVE AND OBJECTIVE BOX
VASCULAR SURGERY CONSULT NOTE  --------------------------------------------------------------------------------------------    Patient is a 33y old  Female who presents with a chief complaint of non-functioning fistulagram    HPI: 33F with ESRD, s/p L radiocephalic AVF creation on 9/28. Patient also has permacath. Patient was seen out-patient by Dr. Antoine for non-functioning AVF. Patient was last dialyzed on saturday through the permacath. Pt sent for admit for fistulagram with possible revision in the AM with Dr. Antoine.     Patient denies fevers/chills, denies lightheadedness/dizziness, denies SOB/chest pain, denies nausea/vomiting, denies constipation/diarrhea.      ROS: 10-system review is otherwise negative except HPI above.      PAST MEDICAL & SURGICAL HISTORY:  Hyperprolactinemia  Stage 5 chronic kidney disease not on chronic dialysis  Hypertension, unspecified type  Migraine without aura, not intractable, without status migrainosus  History of bilateral tubal ligation    FAMILY HISTORY:  Family history of renal failure syndrome (Uncle)  Family history of hypertension (Father)    [] Family history not pertinent as reviewed with the patient and family    ALLERGIES: No Known Allergies    CURRENT MEDICATIONS  MEDICATIONS (STANDING):   MEDICATIONS (PRN):  --------------------------------------------------------------------------------------------    Vitals:   T(C): 36.8 (12-03-18 @ 16:23), Max: 36.8 (12-03-18 @ 16:23)  HR: 81 (12-03-18 @ 16:23) (81 - 81)  BP: 133/85 (12-03-18 @ 16:23) (133/85 - 133/85)  RR: 20 (12-03-18 @ 16:23) (20 - 20)  SpO2: 100% (12-03-18 @ 16:23) (100% - 100%)  CAPILLARY BLOOD GLUCOSE      Height (cm): 154.94 (12-03 @ 16:23)  Weight (kg): 49 (12-03 @ 16:23)  BMI (kg/m2): 20.4 (12-03 @ 16:23)  BSA (m2): 1.45 (12-03 @ 16:23)    PHYSICAL EXAM:  General: NAD, Lying in bed comfortably  Neuro: A+Ox3  HEENT: NC/AT, EOMI  Neck: Soft, supple  Cardio: RRR, nml S1/S2  Resp: Good effort, CTA b/l  Thorax: No chest wall tenderness. R permacath in place. GI/Abd: Soft, NT/ND, no rebound/guarding, no masses palpated  Vascular: All 4 extremities warm, B/l radial pulses palpable, L AVF no thrill or bruit. sensations intact. cap refill < 2 seconds. no TTP.   Skin: Intact, no breakdown  Musculoskeletal: All 4 extremities moving spontaneously, no limitations.    --------------------------------------------------------------------------------------------    ASSESSMENT: Patient is a 33y old f admitted for fistulagram in AM for non-functioning L AVF.     PLAN:   - admit for 23 hour stay. Will go to OR tomorrow AM with Dr. Antoine for fistulagram and possible revision. Most likely d/c after procedure.   - NPO after midnight   - will pre-op tonight    - Plan discussed with Dr. Antoine

## 2018-12-03 NOTE — ED ADULT TRIAGE NOTE - CHIEF COMPLAINT QUOTE
Patient arrived to ED today with c/o need for her dialysis catheter to be flushed.  Patient goes tues-thurs-sat for hemodialysis.

## 2018-12-03 NOTE — ED STATDOCS - PROGRESS NOTE DETAILS
34 y/o F pt on dialysis with PMHx HTN presents to the ED c/o problem with her L arm fistula.  Pt went to the Clinic for this and was told to come into the ED.  Last time using L arm fistula was sept, on Sept 20th she had a second fistula placed.  Pt is not c/o of any symptoms.  No further acute complaints at this time. Vascular at [facility]: Dr. Antoine  Spoke with Vascular PA, pt to be admitted for new fistula.  Pt will be sent to the Main ED for further treatment and evaluation. Initial orders placed.

## 2018-12-03 NOTE — ED ADULT NURSE NOTE - OBJECTIVE STATEMENT
pt alert and orienetd x4 came in c/o needing right chest wall permicath flushed. pt states she gets dialysis TTS. respirations even unlabored. pt states we cannot use left arm for potential AF fisutla. pt educated on plan of care, pt able to successfully teach back plan of care to RN, RN will continue to reeducate pt during hospital stay.

## 2018-12-03 NOTE — ED PROVIDER NOTE - CARDIAC, MLM
Normal rate, regular rhythm.  Heart sounds S1, S2.  No murmurs, rubs or gallops. permacath  with no evidence of infection on chest wall

## 2018-12-04 VITALS
RESPIRATION RATE: 17 BRPM | SYSTOLIC BLOOD PRESSURE: 101 MMHG | HEART RATE: 86 BPM | TEMPERATURE: 98 F | DIASTOLIC BLOOD PRESSURE: 83 MMHG | OXYGEN SATURATION: 95 %

## 2018-12-04 DIAGNOSIS — T82.590A OTHER MECHANICAL COMPLICATION OF SURGICALLY CREATED ARTERIOVENOUS FISTULA, INITIAL ENCOUNTER: ICD-10-CM

## 2018-12-04 LAB
BLD GP AB SCN SERPL QL: SIGNIFICANT CHANGE UP
TYPE + AB SCN PNL BLD: SIGNIFICANT CHANGE UP

## 2018-12-04 PROCEDURE — 36902 INTRO CATH DIALYSIS CIRCUIT: CPT | Mod: 78

## 2018-12-04 PROCEDURE — 93010 ELECTROCARDIOGRAM REPORT: CPT

## 2018-12-04 RX ORDER — ONDANSETRON 8 MG/1
4 TABLET, FILM COATED ORAL ONCE
Qty: 0 | Refills: 0 | Status: DISCONTINUED | OUTPATIENT
Start: 2018-12-04 | End: 2018-12-04

## 2018-12-04 RX ORDER — FENTANYL CITRATE 50 UG/ML
25 INJECTION INTRAVENOUS
Qty: 0 | Refills: 0 | Status: DISCONTINUED | OUTPATIENT
Start: 2018-12-04 | End: 2018-12-04

## 2018-12-04 NOTE — ASU DISCHARGE PLAN (ADULT/PEDIATRIC). - NOTIFY
Numbness, color, or temperature change to extremity/Pain not relieved by Medications/Swelling that continues/Persistent Nausea and Vomiting/Bleeding that does not stop/Fever greater than 101

## 2018-12-04 NOTE — ASU DISCHARGE PLAN (ADULT/PEDIATRIC). - SPECIAL INSTRUCTIONS
WOUND CARE: Apply clean gauze and paper tape over drain site once a day.   BATHING: You may shower.   ACTIVITY: No heavy lifting or straining. Otherwise, you may return to your usual level of physical activity.   DIET: Return to your usual diet.  NOTIFY YOUR SURGEON IF: You have any bleeding that does not stop, any pus draining from your wound(s), any fever (over 100.4 F) or chills, persistent nausea/vomiting, persistent diarrhea, or if your pain is not controlled on your discharge pain medications.  FOLLOW-UP: Please follow up with Dr. Antoine in 7-10 days. Call for appointment upon discharge.     You are scheduled for dialysis today at your dialysis center at 8:45 pm. WOUND CARE: Apply clean gauze and paper tape over drain site once a day.   BATHING: You may shower.   ACTIVITY: No heavy lifting or straining. Otherwise, you may return to your usual level of physical activity.   DIET: Return to your usual diet.  NOTIFY YOUR SURGEON IF: You have any bleeding that does not stop, any pus draining from your wound(s), any fever (over 100.4 F) or chills, persistent nausea/vomiting, persistent diarrhea, or if your pain is not controlled on your discharge pain medications.  FOLLOW-UP: Please follow up with Dr. Antoine in 7-10 days. Call for appointment upon discharge.     You are scheduled for dialysis today at your dialysis center at 8:45 pm. You will receive dialysis through your permacath.

## 2018-12-04 NOTE — DISCHARGE NOTE ADULT - PATIENT PORTAL LINK FT
You can access the [facility] Patient Portal, offered by [facility], by registering with the following website: [email or web address]

## 2018-12-04 NOTE — DISCHARGE NOTE ADULT - CARE PLAN
Principal Discharge DX:	Dialysis AV fistula malfunction, initial encounter  Goal:	surgical repair to restore perfusion  Assessment and plan of treatment:	Improve perfusion and AVF access

## 2018-12-04 NOTE — BRIEF OPERATIVE NOTE - PROCEDURE
<<-----Click on this checkbox to enter Procedure Angioplasty of arteriovenous fistula  12/04/2018  Angioplasty of radiocephalic AVF LUE  Active  EMALDONAKAREN

## 2018-12-12 ENCOUNTER — APPOINTMENT (OUTPATIENT)
Dept: VASCULAR SURGERY | Facility: CLINIC | Age: 33
End: 2018-12-12
Payer: MEDICAID

## 2018-12-12 VITALS
SYSTOLIC BLOOD PRESSURE: 119 MMHG | OXYGEN SATURATION: 99 % | WEIGHT: 110 LBS | DIASTOLIC BLOOD PRESSURE: 75 MMHG | HEIGHT: 62 IN | BODY MASS INDEX: 20.24 KG/M2 | TEMPERATURE: 98.6 F | HEART RATE: 76 BPM

## 2018-12-12 DIAGNOSIS — N18.6 END STAGE RENAL DISEASE: ICD-10-CM

## 2018-12-12 PROCEDURE — 99024 POSTOP FOLLOW-UP VISIT: CPT

## 2018-12-26 PROCEDURE — 86225 DNA ANTIBODY NATIVE: CPT

## 2018-12-26 PROCEDURE — 84146 ASSAY OF PROLACTIN: CPT

## 2018-12-26 PROCEDURE — 80307 DRUG TEST PRSMV CHEM ANLYZR: CPT

## 2018-12-26 PROCEDURE — 36415 COLL VENOUS BLD VENIPUNCTURE: CPT

## 2018-12-26 PROCEDURE — 88313 SPECIAL STAINS GROUP 2: CPT

## 2018-12-26 PROCEDURE — 70551 MRI BRAIN STEM W/O DYE: CPT

## 2018-12-26 PROCEDURE — 85027 COMPLETE CBC AUTOMATED: CPT

## 2018-12-26 PROCEDURE — T1013: CPT

## 2018-12-26 PROCEDURE — 82652 VIT D 1 25-DIHYDROXY: CPT

## 2018-12-26 PROCEDURE — 76000 FLUOROSCOPY <1 HR PHYS/QHP: CPT

## 2018-12-26 PROCEDURE — 84702 CHORIONIC GONADOTROPIN TEST: CPT

## 2018-12-26 PROCEDURE — P9016: CPT

## 2018-12-26 PROCEDURE — 86780 TREPONEMA PALLIDUM: CPT

## 2018-12-26 PROCEDURE — 88348 ELECTRON MICROSCOPY DX: CPT

## 2018-12-26 PROCEDURE — 86803 HEPATITIS C AB TEST: CPT

## 2018-12-26 PROCEDURE — 93005 ELECTROCARDIOGRAM TRACING: CPT

## 2018-12-26 PROCEDURE — 86036 ANCA SCREEN EACH ANTIBODY: CPT

## 2018-12-26 PROCEDURE — 83970 ASSAY OF PARATHORMONE: CPT

## 2018-12-26 PROCEDURE — 51702 INSERT TEMP BLADDER CATH: CPT

## 2018-12-26 PROCEDURE — 84484 ASSAY OF TROPONIN QUANT: CPT

## 2018-12-26 PROCEDURE — 85018 HEMOGLOBIN: CPT

## 2018-12-26 PROCEDURE — 80053 COMPREHEN METABOLIC PANEL: CPT

## 2018-12-26 PROCEDURE — 86923 COMPATIBILITY TEST ELECTRIC: CPT

## 2018-12-26 PROCEDURE — 86850 RBC ANTIBODY SCREEN: CPT

## 2018-12-26 PROCEDURE — 86901 BLOOD TYPING SEROLOGIC RH(D): CPT

## 2018-12-26 PROCEDURE — 84156 ASSAY OF PROTEIN URINE: CPT

## 2018-12-26 PROCEDURE — 82728 ASSAY OF FERRITIN: CPT

## 2018-12-26 PROCEDURE — 83516 IMMUNOASSAY NONANTIBODY: CPT

## 2018-12-26 PROCEDURE — 99285 EMERGENCY DEPT VISIT HI MDM: CPT | Mod: 25

## 2018-12-26 PROCEDURE — 83880 ASSAY OF NATRIURETIC PEPTIDE: CPT

## 2018-12-26 PROCEDURE — 86900 BLOOD TYPING SEROLOGIC ABO: CPT

## 2018-12-26 PROCEDURE — 36430 TRANSFUSION BLD/BLD COMPNT: CPT

## 2018-12-26 PROCEDURE — 88350 IMFLUOR EA ADDL 1ANTB STN PX: CPT

## 2018-12-26 PROCEDURE — 86706 HEP B SURFACE ANTIBODY: CPT

## 2018-12-26 PROCEDURE — 71045 X-RAY EXAM CHEST 1 VIEW: CPT

## 2018-12-26 PROCEDURE — 96374 THER/PROPH/DIAG INJ IV PUSH: CPT | Mod: XU

## 2018-12-26 PROCEDURE — 85610 PROTHROMBIN TIME: CPT

## 2018-12-26 PROCEDURE — 84480 ASSAY TRIIODOTHYRONINE (T3): CPT

## 2018-12-26 PROCEDURE — 86705 HEP B CORE ANTIBODY IGM: CPT

## 2018-12-26 PROCEDURE — 85730 THROMBOPLASTIN TIME PARTIAL: CPT

## 2018-12-26 PROCEDURE — 74176 CT ABD & PELVIS W/O CONTRAST: CPT

## 2018-12-26 PROCEDURE — 88346 IMFLUOR 1ST 1ANTB STAIN PX: CPT

## 2018-12-26 PROCEDURE — 82436 ASSAY OF URINE CHLORIDE: CPT

## 2018-12-26 PROCEDURE — 84436 ASSAY OF TOTAL THYROXINE: CPT

## 2018-12-26 PROCEDURE — 76770 US EXAM ABDO BACK WALL COMP: CPT

## 2018-12-26 PROCEDURE — 93306 TTE W/DOPPLER COMPLETE: CPT

## 2018-12-26 PROCEDURE — 83615 LACTATE (LD) (LDH) ENZYME: CPT

## 2018-12-26 PROCEDURE — 85014 HEMATOCRIT: CPT

## 2018-12-26 PROCEDURE — 84466 ASSAY OF TRANSFERRIN: CPT

## 2018-12-26 PROCEDURE — C1894: CPT

## 2018-12-26 PROCEDURE — 83036 HEMOGLOBIN GLYCOSYLATED A1C: CPT

## 2018-12-26 PROCEDURE — 82310 ASSAY OF CALCIUM: CPT

## 2018-12-26 PROCEDURE — 93975 VASCULAR STUDY: CPT

## 2018-12-26 PROCEDURE — 83835 ASSAY OF METANEPHRINES: CPT

## 2018-12-26 PROCEDURE — 83935 ASSAY OF URINE OSMOLALITY: CPT

## 2018-12-26 PROCEDURE — 83550 IRON BINDING TEST: CPT

## 2018-12-26 PROCEDURE — 84100 ASSAY OF PHOSPHORUS: CPT

## 2018-12-26 PROCEDURE — 85652 RBC SED RATE AUTOMATED: CPT

## 2018-12-26 PROCEDURE — 80048 BASIC METABOLIC PNL TOTAL CA: CPT

## 2018-12-26 PROCEDURE — 83001 ASSAY OF GONADOTROPIN (FSH): CPT

## 2018-12-26 PROCEDURE — 87340 HEPATITIS B SURFACE AG IA: CPT

## 2018-12-26 PROCEDURE — 88305 TISSUE EXAM BY PATHOLOGIST: CPT

## 2018-12-26 PROCEDURE — 86160 COMPLEMENT ANTIGEN: CPT

## 2018-12-26 PROCEDURE — 86334 IMMUNOFIX E-PHORESIS SERUM: CPT

## 2018-12-26 PROCEDURE — 80061 LIPID PANEL: CPT

## 2018-12-26 PROCEDURE — 86140 C-REACTIVE PROTEIN: CPT

## 2018-12-26 PROCEDURE — C1769: CPT

## 2018-12-26 PROCEDURE — 82550 ASSAY OF CK (CPK): CPT

## 2018-12-26 PROCEDURE — 86709 HEPATITIS A IGM ANTIBODY: CPT

## 2018-12-26 PROCEDURE — 77012 CT SCAN FOR NEEDLE BIOPSY: CPT

## 2018-12-26 PROCEDURE — 83002 ASSAY OF GONADOTROPIN (LH): CPT

## 2018-12-26 PROCEDURE — 84300 ASSAY OF URINE SODIUM: CPT

## 2018-12-26 PROCEDURE — C1725: CPT

## 2018-12-26 PROCEDURE — 86704 HEP B CORE ANTIBODY TOTAL: CPT

## 2018-12-26 PROCEDURE — 83735 ASSAY OF MAGNESIUM: CPT

## 2018-12-26 PROCEDURE — 81001 URINALYSIS AUTO W/SCOPE: CPT

## 2018-12-26 PROCEDURE — 83010 ASSAY OF HAPTOGLOBIN QUANT: CPT

## 2018-12-26 PROCEDURE — 96375 TX/PRO/DX INJ NEW DRUG ADDON: CPT | Mod: XU

## 2018-12-26 PROCEDURE — 86038 ANTINUCLEAR ANTIBODIES: CPT

## 2018-12-26 PROCEDURE — 88312 SPECIAL STAINS GROUP 1: CPT

## 2018-12-26 PROCEDURE — 99285 EMERGENCY DEPT VISIT HI MDM: CPT

## 2018-12-26 PROCEDURE — C1887: CPT

## 2018-12-26 PROCEDURE — 84443 ASSAY THYROID STIM HORMONE: CPT

## 2018-12-26 PROCEDURE — 83521 IG LIGHT CHAINS FREE EACH: CPT

## 2019-01-02 ENCOUNTER — APPOINTMENT (OUTPATIENT)
Dept: VASCULAR SURGERY | Facility: CLINIC | Age: 34
End: 2019-01-02
Payer: MEDICAID

## 2019-01-02 VITALS
DIASTOLIC BLOOD PRESSURE: 75 MMHG | WEIGHT: 112 LBS | HEIGHT: 65 IN | TEMPERATURE: 98.5 F | SYSTOLIC BLOOD PRESSURE: 118 MMHG | HEART RATE: 72 BPM | BODY MASS INDEX: 18.66 KG/M2 | OXYGEN SATURATION: 99 %

## 2019-01-02 DIAGNOSIS — I10 ESSENTIAL (PRIMARY) HYPERTENSION: ICD-10-CM

## 2019-01-02 PROCEDURE — 99214 OFFICE O/P EST MOD 30 MIN: CPT

## 2019-01-02 PROCEDURE — 93931 UPPER EXTREMITY STUDY: CPT

## 2019-02-04 ENCOUNTER — APPOINTMENT (OUTPATIENT)
Dept: NEPHROLOGY | Facility: CLINIC | Age: 34
End: 2019-02-04

## 2019-02-06 ENCOUNTER — APPOINTMENT (OUTPATIENT)
Dept: VASCULAR SURGERY | Facility: CLINIC | Age: 34
End: 2019-02-06
Payer: MEDICAID

## 2019-02-06 VITALS
HEIGHT: 60 IN | TEMPERATURE: 98 F | DIASTOLIC BLOOD PRESSURE: 73 MMHG | HEART RATE: 80 BPM | SYSTOLIC BLOOD PRESSURE: 109 MMHG | BODY MASS INDEX: 21.99 KG/M2 | WEIGHT: 112 LBS | OXYGEN SATURATION: 99 %

## 2019-02-06 DIAGNOSIS — Z99.2 END STAGE RENAL DISEASE: ICD-10-CM

## 2019-02-06 DIAGNOSIS — I77.0 ARTERIOVENOUS FISTULA, ACQUIRED: ICD-10-CM

## 2019-02-06 DIAGNOSIS — N18.6 END STAGE RENAL DISEASE: ICD-10-CM

## 2019-02-06 PROCEDURE — 36589 REMOVAL TUNNELED CV CATH: CPT

## 2019-04-01 PROBLEM — I77.0 ARTERIOVENOUS FISTULA: Status: ACTIVE | Noted: 2018-12-12

## 2019-04-01 PROBLEM — N18.6 ESRD (END STAGE RENAL DISEASE) ON DIALYSIS: Status: ACTIVE | Noted: 2018-10-16

## 2019-04-01 NOTE — PROCEDURE
[FreeTextEntry1] : removal of R IJ tunneled dialysis catheter [FreeTextEntry2] : ESRD on HD [FreeTextEntry3] : After obtaining informed consent, I utilized a sterile prep, and 1% lido anesthesia. The cuff was bluntly freed from SQ space and the catheter was removed intact without complication. Pressure to surgical site for 5 minutes, dry occlusive dressing was placed. Pt was discharged without complication. Follow-up as needed.\par

## 2019-04-12 NOTE — ED ADULT NURSE NOTE - CHIEF COMPLAINT
[place] INPATIENT ENCOUNTER  CONSULT NOTE    ADMISSION DATE:  4/11/2019  CONSULTING PHYSICIAN:  Ronald Gonzalez MD  ATTENDING PHYSICIAN:  Vera Duarte MD   CODE STATUS:  Full Resuscitation      CHIEF COMPLAINT:  Abdominal pain and alcohol intoxication    SUBJECTIVE:    I was asked to see Keegan Elizondo at the request of No ref. provider found   for Gastroenterology Consultation.   Keegan Elizondo is a 70 year old male patient admitted with Acute alcoholic intoxication with complication (CMS/HCC) [F10.929]  Total self-care deficit [R41.89]  Altered mental status, unspecified altered mental status type [R41.82]  Dementia associated with alcoholism without behavioral disturbance (CMS/[facility]) [F10.27].  Gastroenterology Consultation was requested for alcohol intoxication. Patient has past medical history notable for alcohol abuse, diabetes, hypertension, colon cancer status post resection who was due for colonoscopy but presents in alcohol withdrawal. Colon cancer was diagnosed in 2017 after which she has had some total laparoscopy colectomy and last EGD colonoscopy was in May 2018 and biopsies had shown extensive intestinal metaplasia and stomach. On colonoscopy patient was noted to have poor prep and was scheduled to undergo colonoscopy today but presented in alcohol withdrawal. He reports consumption of 4 bottles of beer daily but had last alcoholic drink 2 days ago.      HISTORIES:    I have reviewed the past medical history, family history, social history, medications and allergies listed in the medical record as obtained by my nursing staff and support staff and agree with their documentation.    REVIEW OF SYSTEMS:    All systems reviewed and are negative with the exception of the findings noted in the history of present illness.      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     VITAL SIGNS:    Vital Last Value 24 Hour Range   Temperature 98.2 °F (36.8 °C) (04/12/19 1414) Temp  Min: 98.2 °F (36.8 °C)  Max: 101.1 °F (38.4 °C)   Pulse 85 (04/12/19 1450) Pulse  Min: 74  Max: 94   Respiratory 18 (04/12/19 1414) Resp  Min: 16  Max: 18   Non-Invasive  Blood Pressure 148/88 (04/12/19 1450) BP  Min: 148/88  Max: 190/100   Pulse Oximetry 99 % (04/12/19 1414) SpO2  Min: 93 %  Max: 100 %     Vital Today Admitted   Weight 72 kg (04/12/19 0634) Weight: 72 kg (04/12/19 0634)   Height N/A Height: 5' 11\" (180.3 cm) (04/12/19 0634)   BMI N/A BMI (Calculated): 22.14 (04/12/19 0634)     INTAKE/OUTPUT:      Intake/Output Summary (Last 24 hours) at 4/12/2019 1732  Last data filed at 4/12/2019 1600  Gross per 24 hour   Intake 960 ml   Output --   Net 960 ml        PHYSICAL EXAM:    General: The patient is well developed, well nourished, in no acute distress, appears stated age.   Neurologic: Alert. Normal mood and affect, normal speech, no gross tremor.  Skin: Warm and dry, normal turgor.  Head: Normocephalic.  Eyes: Normal conjunctivae and sclerae.  Pupils equal, round, reactive to light.  Extraocular movements intact.  HEENT: Oral pharynx clear, moist mucous membranes, external nose is normal to inspection.  Neck: Symmetric without swelling or tenderness.   Respiratory: Respiratory effort normal.   Cardiovascular: Regular rate and rhythm.  Extremities: No swelling or tenderness.  Gastrointestinal:  Soft and nontender.  Normal bowel sounds.  No hepatomegaly or splenomegaly.     LABORATORY DATA:    Lab Results   Component Value Date    SODIUM 141 04/12/2019    POTASSIUM 3.9 04/12/2019    CHLORIDE 111 (H) 04/12/2019    CO2 22 04/12/2019    BUN  9 04/12/2019    CREATININE 1.04 04/12/2019    GLUCOSE 161 (H) 04/12/2019     Lab Results   Component Value Date    WBC 3.9 (L) 04/12/2019    WBC 4.1 (L) 04/11/2019    HCT 23.8 (L) 04/12/2019    HCT 28.5 (L) 04/11/2019    HGB 8.1 (L) 04/12/2019    HGB 9.7 (L) 04/11/2019    PLT 44 (L) 04/12/2019    PLT 58 (L) 04/11/2019     INR (no units)   Date Value   04/12/2019 1.3     Lab Results   Component Value Date     (H) 04/12/2019     (H) 04/11/2019     (H) 04/12/2019     (H) 04/11/2019    GGTP 165 (H) 01/11/2018    ALKPT 100 04/12/2019    ALKPT 112 04/11/2019    BILIRUBIN 1.7 (H) 04/12/2019    BILIRUBIN 1.0 04/11/2019       IMAGING STUDIES:    Imaging studies reviewed.  The pertinent positives are eating exam ordered today and will be reviewed    ENDOSCOPY:  EGD and colonoscopy in May 2018 had shown extensive intestinal metaplasia and stomach and poor prep with diverticulosis    70 y.o male with history of colon cancer, s/p resection who presents with ETOH withdrawal. We were consulted as patient was scheduled for colonoscopy today.      1. History of colon cancer             - last colonoscopy on 5/25/18 with findings of patent anastomosis site. Prep was poor and small polyps may have been missed             - Repeat colonoscopy scheduled 4/12/19, however, patient did not prep and presented with ETOH withdrawal             - CEA 2/28/19 12.9  2. ETOH abuse             - admitted with withdrawal             - [place]  Protocol  3. Pancytopenia             - suspicious for malignancy  4. Anemia - microcytic             - likely ETOH induced             - prior iron/% sat low. TIBC normal             - no sign of overt GI blood loss             - prior stage 3 colon cancer without chemo/radiation. Possible recurrence             - Prior EGD/colon 5/2018 negative for bleeding, however, poor prep  5. Elevated liver enzymes             -  Likely ETOH induced. Trending downward             - History of  Hepatitis C             - Thrombocytopenia, anemia,low albumin, hyperbilirubinemia suspicious for cirrhosis. Prior US without liver disease noted.                  ANTIPLATELET / ANTICOAGULATION:  MEDICATION:  None     PLAN:    1. Continue CIWA protocol  2. Patient is agreeable for colonoscopy. Timing to be determined based on clinical status  3. Iron studies/ferritin  4. Chronic Hepatitis panel  5. Await CT imaging                       DIET:  NPO        CASE DISCUSSED WITH:  Patient and MD     A copy of this note was sent to the referring provider. Thank you for involving me   in the care of this patient.           The patient is a 33y Female complaining of medical evaluation.

## 2019-05-08 ENCOUNTER — APPOINTMENT (OUTPATIENT)
Dept: VASCULAR SURGERY | Facility: CLINIC | Age: 34
End: 2019-05-08

## 2019-06-17 NOTE — PROGRESS NOTE ADULT - SUBJECTIVE AND OBJECTIVE BOX
Patient Education        The Knee Joint: Anatomy Sketch    Current as of: September 20, 2018  Content Version: 12.0  © 6434-7907 [employer], [employer]. Care instructions adapted under license by [facility] ([facility]). If you have questions about a medical condition or this instruction, always ask your healthcare professional. [place] [address] any warranty or liability for your use of this information. [facility] DIVISION OF KIDNEY DISEASES AND HYPERTENSION -- FOLLOW UP NOTE  --------------------------------------------------------------------------------  Chief Complaint: ATN    24 hour events/subjective:  Pt seen and examined this AM  S/p renal biopsy with hematoma;   HGB down to 6.6 ; getting 2 units PRBC  Cr in 6 range  Last Cr as outpatient was 5.8 ;      PAST HISTORY  --------------------------------------------------------------------------------  No significant changes to PMH, PSH, FHx, SHx, unless otherwise noted    ALLERGIES & MEDICATIONS  --------------------------------------------------------------------------------  Allergies    No Known Allergies    Intolerances      Standing Inpatient Medications  amLODIPine   Tablet 10 milliGRAM(s) Oral daily  ferrous    sulfate 325 milliGRAM(s) Oral daily  sodium bicarbonate  Infusion 0.183 mEq/kG/Hr IV Continuous <Continuous>    PRN Inpatient Medications  acetaminophen   Tablet. 650 milliGRAM(s) Oral every 6 hours PRN  acetaminophen 325 mG/butalbital 50 mG/caffeine 40 mG 1 Tablet(s) Oral every 6 hours PRN  oxyCODONE    5 mG/acetaminophen 325 mG 2 Tablet(s) Oral every 6 hours PRN      REVIEW OF SYSTEMS  --------------------------------------------------------------------------------  Gen: No weight changes, fatigue, fevers/chills, weakness  Skin: No rashes  Head/Eyes/Ears/Mouth: No headache; Normal hearing; Normal vision w/o blurriness; No sinus pain/discomfort, sore throat  Respiratory: No dyspnea, cough, wheezing, hemoptysis  CV: No chest pain, PND, orthopnea  GI: No abdominal pain, diarrhea, constipation, nausea, vomiting, melena, hematochezia  : No increased frequency, dysuria, hematuria, nocturia  MSK: No joint pain/swelling; no back pain; no edema  Neuro: No dizziness/lightheadedness, weakness, seizures, numbness, tingling  Heme: No easy bruising or bleeding  Endo: No heat/cold intolerance  Psych: No significant nervousness, anxiety, stress, depression    All other systems were reviewed and are negative, except as noted.    VITALS/PHYSICAL EXAM  --------------------------------------------------------------------------------  T(C): 36.9 (09-01-18 @ 12:21), Max: 37.1 (09-01-18 @ 07:24)  HR: 102 (09-01-18 @ 12:21) (84 - 107)  BP: 128/76 (09-01-18 @ 12:21) (105/63 - 154/88)  RR: 20 (09-01-18 @ 12:21) (16 - 20)  SpO2: 100% (09-01-18 @ 02:59) (100% - 100%)  Wt(kg): --    Weight (kg): 51.3 (08-30-18 @ 20:09)      08-31-18 @ 07:01  -  09-01-18 @ 07:00  --------------------------------------------------------  IN: 0 mL / OUT: 1350 mL / NET: -1350 mL      Physical Exam:  	Gen: NAD, well-appearing  	HEENT: PERRL, supple neck, clear oropharynx  	Pulm: CTA B/L  	CV: RRR, S1S2; no rub  	Back: No spinal or CVA tenderness; no sacral edema  	Abd: +BS, soft, nontender/nondistended  	: No suprapubic tenderness  	UE: Warm, FROM, no clubbing, intact strength; no edema; no asterixis  	LE: Warm, FROM, no clubbing, intact strength; no edema  	Neuro: No focal deficits, intact gait  	Psych: Normal affect and mood  	Skin: Warm, without rashes      LABS/STUDIES  --------------------------------------------------------------------------------              6.7    3.7   >-----------<  141      [09-01-18 @ 08:57]              20.4     142  |  105  |  60.0  ----------------------------<  99      [09-01-18 @ 08:57]  4.9   |  24.0  |  6.31        Ca     8.8     [09-01-18 @ 08:57]      Mg     2.3     [08-31-18 @ 06:22]      Phos  4.5     [08-31-18 @ 06:22]    TPro  6.1  /  Alb  3.3  /  TBili  0.3  /  DBili  x   /  AST  8   /  ALT  9   /  AlkPhos  60  [09-01-18 @ 08:57]    PT/INR: PT 11.6 , INR 1.05       [08-30-18 @ 22:42]  PTT: 31.6       [08-30-18 @ 22:42]    Troponin <0.01      [08-30-18 @ 22:42]        [08-31-18 @ 06:22]        [08-31-18 @ 16:28]    Creatinine Trend:  SCr 6.31 [09-01 @ 08:57]  SCr 5.85 [08-31 @ 16:28]  SCr 5.62 [08-31 @ 06:22]  SCr 6.05 [08-30 @ 22:42]    Urinalysis - [08-31-18 @ 02:25]      Color Yellow / Appearance Clear / SG 1.005 / pH 7.0      Gluc Negative / Ketone Negative  / Bili Negative / Urobili Negative       Blood Moderate / Protein 100 / Leuk Est Negative / Nitrite Negative      RBC 6-10 / WBC 0-2 / Hyaline  / Gran  / Sq Epi  / Non Sq Epi Moderate / Bacteria Few    Urine Creatinine 19      [08-31-18 @ 02:26]  Urine Protein 142.0      [08-31-18 @ 02:26]  Urine Sodium 49      [08-31-18 @ 02:24]  Urine Chloride 41      [08-31-18 @ 02:24]  Urine Osmolality 175      [08-31-18 @ 02:25]    Iron 45, TIBC 209, %sat 19      [08-31-18 @ 06:22]  Ferritin 58      [08-31-18 @ 06:22]  HbA1c 5.2      [08-31-18 @ 06:22]  TSH 2.15      [08-31-18 @ 06:22]  Lipid: chol 97, TG 66, HDL 39, LDL 45      [08-31-18 @ 06:22]    HBsAb Nonreact      [08-31-18 @ 18:18]  HBsAg Nonreact      [08-31-18 @ 18:18]  HBcAb Nonreact      [08-31-18 @ 18:18]  HCV 0.20, Nonreact      [08-31-18 @ 18:18]

## 2020-02-15 NOTE — PATIENT PROFILE ADULT. - SOCIAL CONCERNS
Mailed copy of care plan to client.  As requested/needed:  emailed CPS to [facility] for [facility], updated services in access as needed and submitted appropriate referrals/[facility], and entered MMIS. Chart was returned to .   Sabina Capone  CMS Supervisor  [facility]  [phone]    
Pt c/o pain to posterior neck yesterday  Developed nausea vomiting and diarrhea at midnight last night  5 emesis  2 diarrhea  Denies cough  States feels weak, dehydrated.
None

## 2020-03-16 ENCOUNTER — NON-APPOINTMENT (OUTPATIENT)
Age: 35
End: 2020-03-16

## 2020-06-24 ENCOUNTER — NON-APPOINTMENT (OUTPATIENT)
Age: 35
End: 2020-06-24

## 2020-10-29 NOTE — ED ADULT NURSE NOTE - HOW OFTEN DO YOU HAVE A DRINK CONTAINING ALCOHOL?
EMERGENCY DEPARTMENT ENCOUNTER   ATTENDING ATTESTATION     Pt Name: Basilia Hernandez  MRN: [id number]  [place] 1994  Date of evaluation: 10/28/20       Basilia Hernandez is a 31 y.o. female who presents with Abscess (Right axillary area.) and Hyperglycemia      MDM:     This is a 67-year-old female who comes in today the patient has a right axillary abscess she does have a pocket of fluid. Spoke to the patient about a bedside I&D she states that she needs to talk to the surgeon to have her be put under. I explained to the patient that we do not provide general anesthesia for an axillary abscess. Patient is afebrile not tachypneic she is tachycardic but she is agitated. There is no surrounding erythema. I spoke to the patient about her options that we could either give her medications to calm her down and perform a bedside I&D with local anesthesia or we can give her antibiotics and she could follow-up with her surgeon as an outpatient. The patient states that she will try a bedside I&D. Of note, the patient is very agitated, she chooses not to answer certain questions when I asked her and she stopped talking to me to be on Evoz when I addressed this she said that she was looking at a message from her daughter. 9:14 PM EDT  Reevaluation the patient is much more calm she apologized for her behavior. Bedside I&D was performed with a large amount of purulence. Because of her poorly controlled diabetes we will send her home on antibiotics. Patient will be discharged          Vitals:   Vitals:    10/28/20 1749   BP: (!) 181/78   Pulse: 107   Resp: 16   Temp: 98.6 °F (37 °C)   SpO2: 97%   Weight: 244 lb (110.7 kg)   Height: 5' 7\" (1.702 m)         I personally evaluated and examined the patient in conjunction with the resident and agree with the assessment, treatment plan, and disposition of the patient as recorded by the resident.     I performed a history and physical examination of the patient and discussed management with the resident. I reviewed the residents note and agree with the documented findings and plan of care. Any areas of disagreement are noted on the chart. I was personally present for the key portions of any procedures. I have documented in the chart those procedures where I was not present during the key portions. I have personally reviewed all images and agree with the resident's interpretation. I have reviewed the emergency nurses triage note. I agree with the chief complaint, past medical history, past surgical history, allergies, medications, social and family history as documented unless otherwise noted.     Anthony Pelayo MD  Attending Emergency Physician            Anthony Pelayo MD  10/28/20 [id number] Never

## 2020-12-15 DIAGNOSIS — Z87.448 PERSONAL HISTORY OF OTHER DISEASES OF URINARY SYSTEM: ICD-10-CM

## 2021-02-02 DIAGNOSIS — E55.9 VITAMIN D DEFICIENCY, UNSPECIFIED: ICD-10-CM

## 2021-10-27 ENCOUNTER — RX RENEWAL (OUTPATIENT)
Age: 36
End: 2021-10-27

## 2021-12-07 RX ORDER — ERGOCALCIFEROL 1.25 MG/1
1.25 MG CAPSULE, LIQUID FILLED ORAL
Qty: 5 | Refills: 0 | Status: DISCONTINUED | COMMUNITY
Start: 2021-10-27 | End: 2021-12-07

## 2021-12-07 RX ORDER — CHROMIUM 200 MCG
25 MCG TABLET ORAL DAILY
Qty: 30 | Refills: 3 | Status: DISCONTINUED | COMMUNITY
Start: 2021-02-02 | End: 2021-12-07

## 2022-01-03 ENCOUNTER — EMERGENCY (EMERGENCY)
Facility: HOSPITAL | Age: 37
LOS: 1 days | Discharge: HOME | End: 2022-01-03
Attending: EMERGENCY MEDICINE
Payer: MEDICAID

## 2022-01-03 VITALS
HEART RATE: 71 BPM | TEMPERATURE: 98 F | WEIGHT: 130.07 LBS | HEIGHT: 62 IN | SYSTOLIC BLOOD PRESSURE: 168 MMHG | DIASTOLIC BLOOD PRESSURE: 97 MMHG | OXYGEN SATURATION: 100 % | RESPIRATION RATE: 20 BRPM

## 2022-01-03 DIAGNOSIS — Z98.51 TUBAL LIGATION STATUS: Chronic | ICD-10-CM

## 2022-01-03 PROCEDURE — 99284 EMERGENCY DEPT VISIT MOD MDM: CPT

## 2022-01-04 PROCEDURE — 99284 EMERGENCY DEPT VISIT MOD MDM: CPT | Mod: 25

## 2022-01-04 PROCEDURE — 96374 THER/PROPH/DIAG INJ IV PUSH: CPT

## 2022-01-04 RX ORDER — METOCLOPRAMIDE HCL 10 MG
10 TABLET ORAL ONCE
Refills: 0 | Status: COMPLETED | OUTPATIENT
Start: 2022-01-04 | End: 2022-01-04

## 2022-01-04 RX ADMIN — Medication 104 MILLIGRAM(S): at 01:18

## 2022-01-04 NOTE — ED PROVIDER NOTE - NSFOLLOWUPINSTRUCTIONS_ED_ALL_ED_FT
Cefalea migrañosa    Migraine Headache      Tonia cefalea migrañosa es un dolor intenso y punzante en donavan o ambos lados de la vania. Las cefaleas migrañosas también pueden causar otros síntomas, pato náuseas, vómitos y sensibilidad a la adriel y el ruido. Tonia cefalea migrañosa puede durar desde 4 horas hasta 3 días. Hable con hawley médico sobre los factores que pueden causar (desencadenar) las cefaleas migrañosas.      ¿Cuáles son las causas?  Se desconoce la causa exacta de esta afección. Sin embargo, tonia migraña puede aparecer cuando los nervios del cerebro se irritan y liberan ciertas sustancias químicas que causan inflamación de los vasos sanguíneos. Esta inflamación provoca dolor. Esta afección puede desencadenarse o ser causada por lo siguiente:  •Consumo de alcohol.      •Consumo de cigarrillos.    •Jodi medicamentos pato por ejemplo:  •Medicamentos para aliviar el dolor torácico (nitroglicerina).      •Anticonceptivos orales.      •Estrógeno.      •Ciertos medicamentos para la presión arterial.        •[place] o beber productos que contienen nitratos, glutamato, aspartamo o tiramina. Los quesos añejados, el chocolate o la cafeína también pueden ser desencadenantes.      •Hacer actividad física.    Otros factores que pueden provocar cefalea migrañosa son los siguientes:  •Menstruación.      •Embarazo.      •Hambre.      •Estrés.      •Dormir poco o dormir demasiado.      •Cambios climáticos.      •Fatiga.        ¿Qué incrementa el riesgo?  Los siguientes factores pueden hacer que usted sea más propenso a tener migrañas:  •Tener cierta edad. Es más probable que esta afección se manifieste en personas que tienen entre 25 y 55 años.      •Ser gavi.      •Tener antecedentes familiares de cefalea migrañosa.      •Ser de jackie caucásica.      •Tener tonia afección de eric mental, pato depresión o ansiedad.      •Ser octavio.        ¿Cuáles son los signos o los síntomas?  El principal síntoma de esta afección es el dolor intenso y punzante. Aliyah dolor puede tener las siguientes características:  •Puede aparecer en cualquier región de la vania, tanto de un lado pato de ambos.      •Puede interferir con las actividades de la roopa cotidiana.      •Puede empeorar con la actividad física.      •Puede empeorar ante la exposición a luces brillantes o a ruidos nevaeh.    Otros síntomas pueden incluir:  •Náuseas.      •Vómitos.      •Mareos.      •Sensibilidad general a las luces brillantes, a los ruidos nevaeh o a los olores.    Antes de tener tonia cefalea migrañosa, puede recibir señales de advertencia (aura). Un aura puede incluir:  •Damien luces intermitentes o tener puntos ciegos.      •Damien puntos brillantes, halos o líneas en zigzag.      •Tener tonia visión en túnel o visión borrosa.      •Sentir entumecimiento u hormigueo.      •Tener dificultad para hablar.      •Debilidad muscular.    Algunas personas tienen síntomas después de tonia cefalea migrañosa (fase posdromal), pato los siguientes:  •Cansancio.      •Dificultad para concentrarte.        ¿Cómo se diagnostica?  La cefalea migrañosa se diagnostica en función de lo siguiente:  •Melisa síntomas.      •Un examen físico.    •Pruebas, pato, por ejemplo:   •Tomografía computarizada (TC) o resonancia magnética (RM) de la vania. Estos estudios de diagnóstico por imágenes pueden ayudar a descartar otras causas de cefalea.      •Jodi tonia muestra de líquido de la médula burgos (punción lumbar) para analizar (análisis de líquido cefalorraquídeo o análisis de LCR).          ¿Cómo se trata?  Esta afección se puede tratar con medicamentos para:  •Aliviar el dolor.      •Aliviar las náuseas.      •Prevenir las cefaleas migrañosas.    El tratamiento de esta afección también puede incluir lo siguiente:  •Acupuntura.      •Cambios en el estilo de roopa, pato evitar los alimentos que provocan las cefaleas migrañosas.      •Biorretroalimentación.      •Terapia cognitiva conductual.        Siga estas instrucciones en hawley casa:    Medicamentos     •Use los medicamentos de venta zakia y los recetados solamente pato se lo haya indicado el médico.    •Pregúntele al médico si el medicamento recetado:  •Hace que sea necesario que evite conducir o usar maquinaria pesada.    •Puede causarle estreñimiento. Es posible que tenga que jodi estas medidas para prevenir o tratar el estreñimiento:  •Beber suficiente líquido pato para mantener la orina de color amarillo pálido.      •Jodi medicamentos recetados o de venta zakia.      •Consumir alimentos ricos en fibra, pato frijoles, cereales integrales, y frutas y verduras frescas.      •Limitar el consumo de alimentos ricos en grasa y azúcares procesados, pato los alimentos fritos o dulces.          Estilo de roopa     • No tom alcohol.      • No consuma ningún producto que contenga nicotina o tabaco, pato cigarrillos, cigarrillos electrónicos y tabaco de mascar. Si necesita ayuda para dejar de fumar, consulte al médico.      •Duerma pato mínimo 8 horas todas las noches.      •Encuentre modos de manejar el estrés, por ejemplo, a través de la meditación, la respiración profunda o el yoga.        Indicaciones generales                 •Lleve un registro diario para averiguar lo que puede provocar las cefaleas migrañosas. Registre, por ejemplo, lo siguiente:  •Lo que usted come y sadia.      •El tiempo que duerme.      •Algún cambio en hawley dieta o en los medicamentos.      •Si tiene tonia cefalea migrañosa:  •Evite los factores que empeoren los síntomas, pato las luces brillantes.      •Resulta útil acostarse en tonia habitación oscura y silenciosa.      •No conduzca vehículos ni opere maquinaria pesada.      •Pregúntele al médico qué actividades son seguras para usted cuando tiene síntomas.        •Concurra a todas las visitas de seguimiento pato se lo haya indicado el médico. Ladera es importante.        Comuníquese con un médico si:    •Tiene síntomas de cefalea migrañosa que son distintos o más intensos que los habituales.      •Tiene más de 15 días de cefalea por mes.        Solicite ayuda inmediatamente si:    •La cefalea migrañosa se vuelve cada vez más intensa.      •La cefalea migrañosa dura más de 72 horas.      •Tiene fiebre.      •Presenta rigidez en el koki.      •Presenta pérdida de la visión.      •Siente debilidad en los músculos o que no puede controlarlos.      •Comienza a perder el equilibrio con frecuencia.      •Presenta dificultad para caminar.      •Se desmaya.      •Tiene tonia convulsión.        Resumen    •Tonia cefalea migrañosa es un dolor intenso y punzante en donavan o ambos lados de la vania. Las migrañas también pueden causar otros síntomas, pato náuseas, vómitos y sensibilidad a la adriel y el ruido.      •Esta afección puede tratarse con medicamentos y cambios en el estilo de roopa. También es posible que deba evitar ciertos factores que desencadenan tonia cefalea migrañosa.      •Lleve un registro diario para averiguar lo que puede provocar las cefaleas migrañosas.      •Comuníquese con el médico si tiene más de 15 días de cefalea en un mes o presenta síntomas de cefalea migrañosa que son distintos o más intensos que los habituales.      Esta información no tiene pato fin reemplazar el consejo del médico. Asegúrese de hacerle al médico cualquier pregunta que tenga.

## 2022-01-04 NOTE — ED PROVIDER NOTE - OBJECTIVE STATEMENT
PT with SPMHX of CKD on HD 2x wk    presents to the ED with complaint of HA that started this evening. Pt states that she was sitting for her HD session (had 15min left) when she had a gradual onset of HA on the RT side of her head behind her eye. Pt states that pain is moderate in nature, feels sharp radiates to the back of her head, made worse with light, improved with Tylenol but persistent. Pt dines fever, chills, weakness, numbness, tingling N/V, SOB, CP, change vision.

## 2022-01-04 NOTE — ED ADULT NURSE REASSESSMENT NOTE - NS ED NURSE REASSESS COMMENT FT1
pt states that she has had a headache since 7pm. Pt states that she took tylenol with no relief. Pt states that she recently started dialysis. Pt denies sob, chest pain, n/v and dizziness. pt educated on plan of care, pt able to successfully teach back plan of care to RN, RN will continue to reeducate pt during hospital stay.

## 2022-01-04 NOTE — ED PROVIDER NOTE - CLINICAL SUMMARY MEDICAL DECISION MAKING FREE TEXT BOX
PT with stable VS, no acute distress, non toxic appearing, tolerating PO in the ED, pt neuro vasc intact, gradual onset of HA, Pt with near resolution of pain with conservative management, Pt to be dc home with meds to pharmacy follow up to PCP, educated about when to return to the ED if needed. PT verbalizes that he understands all instructions and results. Pt informed that ED is open and available 24/7 365 days a yr, encouraged to return to the ED if they have any change in condition, or feel the need for revaluation.    utilized to obtain History, ROS, Physical Exam, explanations of results and plan of care, as well as follow up instructions.

## 2022-01-04 NOTE — ED PROVIDER NOTE - ADDITIONAL NOTES AND INSTRUCTIONS:
PT was evaluated At [facility] ED and was found to have a condition that warranted time of to rest and heal from WORK/SCHOOL.   Tyshawn Palma PA-C

## 2022-01-04 NOTE — ED PROVIDER NOTE - NS ED ROS FT
ROS: CONTUSIONAL: Denies fever, chills, fatigue, wt loss. HEAD: HA,  Denies trauma, Dizziness. EYE: Denies Acute visual changes, diplopia. ENMT: Denies change in hearing, tinnitus, epistaxis, difficulty swallowing, sore throat. CARDIO: Denies CP, palpitations, edema. RESP: Denies Cough, SOB , Diff breathing, hemoptysis. GI: Denies N/V, ABD pain, change in bowel movement. URINARY: Denies difficulty urinating, pelvic pain. MS:  Denies joint pain, back pain, weakness, decreased ROM, swelling. NEURO: Denies change in gait, seizures, loss of sensation, dizziness, confusion LOC.  PSY: NO SI/HI. SKIN: Denies Rash, bruising.

## 2022-01-04 NOTE — ED PROVIDER NOTE - NSICDXFAMILYHX_GEN_ALL_CORE_FT
FAMILY HISTORY:  Family history of hypertension    Uncle  Still living? Yes, Estimated age: Age Unknown  Family history of renal failure syndrome, Age at diagnosis: Age Unknown

## 2022-01-04 NOTE — ED PROVIDER NOTE - ATTENDING CONTRIBUTION TO CARE
I personally saw the patient with the PA, and completed the key components of the history and physical exam. I then discussed the management plan with the PA.   gen in nad resp clear cardiac n mourmur abd soft neuro intact

## 2022-01-04 NOTE — ED PROVIDER NOTE - PATIENT PORTAL LINK FT
You can access the FollowMyHealth Patient Portal offered by [facility] by registering at the following website: [email or web address]. By joining [employer]’s FollowMyHealth portal, you will also be able to view your health information using other applications (apps) compatible with our system.

## 2022-01-04 NOTE — ED PROVIDER NOTE - NSICDXPASTMEDICALHX_GEN_ALL_CORE_FT
PAST MEDICAL HISTORY:  Hyperprolactinemia     Hypertension, unspecified type     Migraine without aura, not intractable, without status migrainosus     Stage 5 chronic kidney disease not on chronic dialysis

## 2022-02-14 RX ORDER — AMLODIPINE BESYLATE 5 MG/1
5 TABLET ORAL
Qty: 30 | Refills: 3 | Status: DISCONTINUED | COMMUNITY
Start: 2020-12-15 | End: 2022-02-14

## 2022-05-31 RX ORDER — RENAGEL 800 MG/1
800 TABLET ORAL
Qty: 270 | Refills: 3 | Status: DISCONTINUED | COMMUNITY
Start: 2019-01-04 | End: 2022-05-31

## 2022-06-16 NOTE — PATIENT PROFILE ADULT. - ATTEMPT TO OOB
06/16/22 1400   CM/SW Referral Data   Referral Source Social Work (self-referral)   Reason for Referral Discharge planning   Pertinent Medical Hx   Does patient have an established PCP? Yes   Patient Info   Advanced directives? Yes  (Wife- HCPOA)   Patient's [address]   Number of Levels in Home 3   Patient lives with Spouse/Significant other   Patient Status Prior to Admission   Independent with ADLs and Mobility Yes  (Assistance w/ medications, driving)   Discharge Needs   Anticipated D/C needs To be determined   Choice of Post-Acute Provider   Informed patient of right to choose their preferred provider Yes     SW contacted pt's wife by phone. Pt lives at home w/ wife, in a tri-level house. Wife reports he was ambulating independently throughout their home prior to admission. Pt is mostly independent w/ adls; wife will help with medication reminders and driving. Equipment: CPAP, cane, walker, shower chair. Pt is hard of hearing and defers to wife for dc planning. Wife looking for update from MD on pt's progress- MD notified.  w/ [facility] 2/21 and [facility] 2001    Plan: TBD    SW to continue following and evaluate discharge needs.      Jeancarlos Castro, [address] [facility] no

## 2022-09-27 DIAGNOSIS — N25.81 SECONDARY HYPERPARATHYROIDISM OF RENAL ORIGIN: ICD-10-CM

## 2022-12-02 DIAGNOSIS — E83.39 OTHER DISORDERS OF PHOSPHORUS METABOLISM: ICD-10-CM

## 2023-03-06 RX ORDER — SEVELAMER CARBONATE 800 MG/1
800 TABLET, FILM COATED ORAL
Qty: 30 | Refills: 3 | Status: ACTIVE | COMMUNITY
Start: 2022-05-31 | End: 1900-01-01

## 2023-06-19 RX ORDER — VIT B COMP NO.3/FOLIC/C/BIOTIN 1 MG-60 MG
1 TABLET ORAL
Qty: 90 | Refills: 3 | Status: ACTIVE | COMMUNITY
Start: 2018-10-16 | End: 1900-01-01
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
## 2023-06-29 RX ORDER — CHOLECALCIFEROL (VITAMIN D3) 1250 MCG
1.25 MG CAPSULE ORAL
Qty: 12 | Refills: 3 | Status: DISCONTINUED | COMMUNITY
Start: 2021-12-07 | End: 2023-06-29

## 2023-06-29 RX ORDER — CINACALCET 30 MG/1
30 TABLET ORAL
Qty: 30 | Refills: 2 | Status: ACTIVE | COMMUNITY
Start: 2022-09-27 | End: 1900-01-01

## 2023-07-05 ENCOUNTER — RX RENEWAL (OUTPATIENT)
Age: 38
End: 2023-07-05

## 2023-07-05 RX ORDER — CARVEDILOL 25 MG/1
25 TABLET, FILM COATED ORAL
Qty: 90 | Refills: 2 | Status: ACTIVE | COMMUNITY
Start: 2020-06-04 | End: 1900-01-01

## 2023-07-28 NOTE — ASU DISCHARGE PLAN (ADULT/PEDIATRIC). - POST OP PHONE #
ED Attending Attestation Note     Date of evaluation: 7/28/2023    This patient was seen by the resident. I have seen and examined the patient, agree with the workup, evaluation, management and diagnosis. The care plan has been discussed. I have reviewed the ECG and concur with the resident's interpretation. My assessment reveals a 58-year-old female presenting with intermittent chest pain that last for seconds at a time. On examination the patient is well-appearing in no acute distress. She has clear lungs, normal work of breathing, no murmurs on cardiac auscultation.         Yomi Juarez MD  07/28/23 3050 [phone]

## 2023-10-25 NOTE — ED ADULT NURSE NOTE - NS ED NOTE  TALK SOMEONE YN
They will do a trial of flonase and see how she does   If not better the allergist vs ENT   Use OTC oral medication too.    No

## 2024-02-26 NOTE — ASU DISCHARGE PLAN (ADULT/PEDIATRIC). - INSTRUCTIONS
REFLEXIVE URINE CULTURE 10,000 TO 50,000 CFU/mL Escherichia coli Abnormal          Testing on this culture has been completed.  If additional testing is required, please contact the microbiology laboratory within 48 hours.        Per Dr. Lewis to start Bactrim Ds twice a day for 7 days. Current urinary symptoms are frequency and uncomfortable with urination. Medication sent to preferred pharmacy, patient is agreeable to plan.     
You may return to your regular diet.

## 2024-08-27 NOTE — CONSULT NOTE ADULT - PROBLEM SELECTOR PROBLEM 1
[Timing] : uses menstrual timing methods [Y] : Patient is sexually active Stage 5 chronic kidney disease not on chronic dialysis [Yes] : Yes [No] : No [Patient would like to be screened for STIs] : Patient would like to be screened for STIs [FreeTextEntry1] :  This patient is a 32 year old female who previously was a longstanding patient of mine. She has not been seen for more than 3 years and returns to reestablish care. Pt reports she has been getting regular menses but the past 2-3 months, the cycles have been longer. Pt reports she had her IUD taken out in 2020. Pt reports she is on Lena lactone and doxycycline. Pt reports she smokes hoka couple times a week.  [PapSmeardate] : 01/2019

## 2025-07-05 NOTE — ED PROVIDER NOTE - NS_EDPROVIDERDISPOUSERTYPE_ED_A_ED
PROVIDER:[TOKEN:[[id number]]],PROVIDER:[TOKEN:[[id number]]] Attending Attestation (For Attendings USE Only)...

## 2025-08-22 ENCOUNTER — INPATIENT (INPATIENT)
Facility: HOSPITAL | Age: 40
LOS: 1 days | Discharge: HOME | DRG: 641 | End: 2025-08-24
Attending: FAMILY MEDICINE | Admitting: STUDENT IN AN ORGANIZED HEALTH CARE EDUCATION/TRAINING PROGRAM
Payer: MEDICAID

## 2025-08-22 VITALS
WEIGHT: 110.23 LBS | RESPIRATION RATE: 16 BRPM | OXYGEN SATURATION: 100 % | HEART RATE: 78 BPM | SYSTOLIC BLOOD PRESSURE: 136 MMHG | HEIGHT: 62 IN | TEMPERATURE: 98 F | DIASTOLIC BLOOD PRESSURE: 83 MMHG

## 2025-08-22 DIAGNOSIS — Z98.51 TUBAL LIGATION STATUS: Chronic | ICD-10-CM

## 2025-08-22 DIAGNOSIS — E87.5 HYPERKALEMIA: ICD-10-CM

## 2025-08-22 LAB
ANION GAP SERPL CALC-SCNC: 15 MMOL/L — SIGNIFICANT CHANGE UP (ref 5–17)
ANION GAP SERPL CALC-SCNC: 15 MMOL/L — SIGNIFICANT CHANGE UP (ref 5–17)
BASOPHILS # BLD AUTO: 0.04 K/UL — SIGNIFICANT CHANGE UP (ref 0–0.2)
BASOPHILS # BLD MANUAL: 0.08 K/UL — SIGNIFICANT CHANGE UP (ref 0–0.2)
BASOPHILS NFR BLD AUTO: 1 % — SIGNIFICANT CHANGE UP (ref 0–2)
BASOPHILS NFR BLD MANUAL: 1.8 % — SIGNIFICANT CHANGE UP (ref 0–2)
BLD GP AB SCN SERPL QL: SIGNIFICANT CHANGE UP
BUN SERPL-MCNC: 98.6 MG/DL — HIGH (ref 8–20)
BUN SERPL-MCNC: 98.9 MG/DL — HIGH (ref 8–20)
CALCIUM SERPL-MCNC: 9.6 MG/DL — SIGNIFICANT CHANGE UP (ref 8.4–10.5)
CALCIUM SERPL-MCNC: 9.9 MG/DL — SIGNIFICANT CHANGE UP (ref 8.4–10.5)
CHLORIDE SERPL-SCNC: 104 MMOL/L — SIGNIFICANT CHANGE UP (ref 96–108)
CHLORIDE SERPL-SCNC: 105 MMOL/L — SIGNIFICANT CHANGE UP (ref 96–108)
CO2 SERPL-SCNC: 15 MMOL/L — LOW (ref 22–29)
CO2 SERPL-SCNC: 16 MMOL/L — LOW (ref 22–29)
CREAT SERPL-MCNC: 10.4 MG/DL — HIGH (ref 0.5–1.3)
CREAT SERPL-MCNC: 10.63 MG/DL — HIGH (ref 0.5–1.3)
EGFR: 4 ML/MIN/1.73M2 — LOW
EOSINOPHIL # BLD AUTO: 0.11 K/UL — SIGNIFICANT CHANGE UP (ref 0–0.5)
EOSINOPHIL # BLD MANUAL: 0 K/UL — SIGNIFICANT CHANGE UP (ref 0–0.5)
EOSINOPHIL NFR BLD AUTO: 2.6 % — SIGNIFICANT CHANGE UP (ref 0–6)
EOSINOPHIL NFR BLD MANUAL: 0 % — SIGNIFICANT CHANGE UP (ref 0–6)
GLUCOSE SERPL-MCNC: 138 MG/DL — HIGH (ref 70–99)
GLUCOSE SERPL-MCNC: 83 MG/DL — SIGNIFICANT CHANGE UP (ref 70–99)
HCT VFR BLD CALC: 22 % — LOW (ref 34.5–45)
HGB BLD-MCNC: 7 G/DL — CRITICAL LOW (ref 11.5–15.5)
IMM GRANULOCYTES # BLD AUTO: 0.02 K/UL — SIGNIFICANT CHANGE UP (ref 0–0.07)
IMM GRANULOCYTES NFR BLD AUTO: 0.5 % — SIGNIFICANT CHANGE UP (ref 0–0.9)
LYMPHOCYTES # BLD AUTO: 1.17 K/UL — SIGNIFICANT CHANGE UP (ref 1–3.3)
LYMPHOCYTES # BLD MANUAL: 1.73 K/UL — SIGNIFICANT CHANGE UP (ref 1–3.3)
LYMPHOCYTES NFR BLD AUTO: 27.9 % — SIGNIFICANT CHANGE UP (ref 13–44)
LYMPHOCYTES NFR BLD MANUAL: 41.2 % — SIGNIFICANT CHANGE UP (ref 13–44)
MCHC RBC-ENTMCNC: 29.5 PG — SIGNIFICANT CHANGE UP (ref 27–34)
MCHC RBC-ENTMCNC: 31.8 G/DL — LOW (ref 32–36)
MCV RBC AUTO: 92.8 FL — SIGNIFICANT CHANGE UP (ref 80–100)
MONOCYTES # BLD AUTO: 0.57 K/UL — SIGNIFICANT CHANGE UP (ref 0–0.9)
MONOCYTES # BLD MANUAL: 0.4 K/UL — SIGNIFICANT CHANGE UP (ref 0–0.9)
MONOCYTES NFR BLD AUTO: 13.6 % — SIGNIFICANT CHANGE UP (ref 2–14)
MONOCYTES NFR BLD MANUAL: 9.6 % — SIGNIFICANT CHANGE UP (ref 2–14)
NEUTROPHILS # BLD AUTO: 2.28 K/UL — SIGNIFICANT CHANGE UP (ref 1.8–7.4)
NEUTROPHILS # BLD MANUAL: 1.99 K/UL — SIGNIFICANT CHANGE UP (ref 1.8–7.4)
NEUTROPHILS NFR BLD AUTO: 54.4 % — SIGNIFICANT CHANGE UP (ref 43–77)
NEUTROPHILS NFR BLD MANUAL: 47.4 % — SIGNIFICANT CHANGE UP (ref 43–77)
NRBC # BLD AUTO: 0 K/UL — SIGNIFICANT CHANGE UP (ref 0–0)
NRBC # FLD: 0 K/UL — SIGNIFICANT CHANGE UP (ref 0–0)
NRBC BLD AUTO-RTO: 0 /100 WBCS — SIGNIFICANT CHANGE UP (ref 0–0)
PLAT MORPH BLD: NORMAL — SIGNIFICANT CHANGE UP
PLATELET # BLD AUTO: 115 K/UL — LOW (ref 150–400)
PMV BLD: 13 FL — SIGNIFICANT CHANGE UP (ref 7–13)
POTASSIUM SERPL-MCNC: 5.2 MMOL/L — SIGNIFICANT CHANGE UP (ref 3.5–5.3)
POTASSIUM SERPL-MCNC: 6.2 MMOL/L — CRITICAL HIGH (ref 3.5–5.3)
POTASSIUM SERPL-SCNC: 5.2 MMOL/L — SIGNIFICANT CHANGE UP (ref 3.5–5.3)
POTASSIUM SERPL-SCNC: 6.2 MMOL/L — CRITICAL HIGH (ref 3.5–5.3)
RBC # BLD: 2.37 M/UL — LOW (ref 3.8–5.2)
RBC # FLD: 15.1 % — HIGH (ref 10.3–14.5)
RBC BLD AUTO: ABNORMAL
SODIUM SERPL-SCNC: 135 MMOL/L — SIGNIFICANT CHANGE UP (ref 135–145)
SODIUM SERPL-SCNC: 135 MMOL/L — SIGNIFICANT CHANGE UP (ref 135–145)
WBC # BLD: 4.19 K/UL — SIGNIFICANT CHANGE UP (ref 3.8–10.5)
WBC # FLD AUTO: 4.19 K/UL — SIGNIFICANT CHANGE UP (ref 3.8–10.5)

## 2025-08-22 PROCEDURE — 86900 BLOOD TYPING SEROLOGIC ABO: CPT

## 2025-08-22 PROCEDURE — 80048 BASIC METABOLIC PNL TOTAL CA: CPT

## 2025-08-22 PROCEDURE — 36415 COLL VENOUS BLD VENIPUNCTURE: CPT

## 2025-08-22 PROCEDURE — 86901 BLOOD TYPING SEROLOGIC RH(D): CPT

## 2025-08-22 PROCEDURE — 86850 RBC ANTIBODY SCREEN: CPT

## 2025-08-22 PROCEDURE — 85025 COMPLETE CBC W/AUTO DIFF WBC: CPT

## 2025-08-22 PROCEDURE — 82962 GLUCOSE BLOOD TEST: CPT

## 2025-08-22 PROCEDURE — 99291 CRITICAL CARE FIRST HOUR: CPT

## 2025-08-22 PROCEDURE — 99223 1ST HOSP IP/OBS HIGH 75: CPT | Mod: GC

## 2025-08-22 RX ORDER — SODIUM ZIRCONIUM CYCLOSILICATE 5 G/5G
10 POWDER, FOR SUSPENSION ORAL ONCE
Refills: 0 | Status: COMPLETED | OUTPATIENT
Start: 2025-08-22 | End: 2025-08-22

## 2025-08-22 RX ORDER — DEXTROSE 50 % IN WATER 50 %
50 SYRINGE (ML) INTRAVENOUS ONCE
Refills: 0 | Status: COMPLETED | OUTPATIENT
Start: 2025-08-22 | End: 2025-08-22

## 2025-08-22 RX ORDER — ONDANSETRON HCL/PF 4 MG/2 ML
4 VIAL (ML) INJECTION EVERY 8 HOURS
Refills: 0 | Status: DISCONTINUED | OUTPATIENT
Start: 2025-08-22 | End: 2025-08-24

## 2025-08-22 RX ORDER — ACETAMINOPHEN 500 MG/5ML
650 LIQUID (ML) ORAL EVERY 6 HOURS
Refills: 0 | Status: DISCONTINUED | OUTPATIENT
Start: 2025-08-22 | End: 2025-08-24

## 2025-08-22 RX ORDER — MAGNESIUM, ALUMINUM HYDROXIDE 200-200 MG
30 TABLET,CHEWABLE ORAL EVERY 4 HOURS
Refills: 0 | Status: DISCONTINUED | OUTPATIENT
Start: 2025-08-22 | End: 2025-08-24

## 2025-08-22 RX ORDER — MELATONIN 5 MG
3 TABLET ORAL AT BEDTIME
Refills: 0 | Status: DISCONTINUED | OUTPATIENT
Start: 2025-08-22 | End: 2025-08-24

## 2025-08-22 RX ADMIN — SODIUM ZIRCONIUM CYCLOSILICATE 10 GRAM(S): 5 POWDER, FOR SUSPENSION ORAL at 20:42

## 2025-08-22 RX ADMIN — Medication 5 UNIT(S): at 20:43

## 2025-08-22 RX ADMIN — Medication 50 MILLILITER(S): at 20:43

## 2025-08-23 ENCOUNTER — TRANSCRIPTION ENCOUNTER (OUTPATIENT)
Age: 40
End: 2025-08-23

## 2025-08-23 LAB
ALBUMIN SERPL ELPH-MCNC: 4.1 G/DL — SIGNIFICANT CHANGE UP (ref 3.3–5.2)
ALP SERPL-CCNC: 109 U/L — SIGNIFICANT CHANGE UP (ref 40–120)
ALT FLD-CCNC: 15 U/L — SIGNIFICANT CHANGE UP
ANION GAP SERPL CALC-SCNC: 14 MMOL/L — SIGNIFICANT CHANGE UP (ref 5–17)
AST SERPL-CCNC: 8 U/L — SIGNIFICANT CHANGE UP
BILIRUB SERPL-MCNC: 0.3 MG/DL — LOW (ref 0.4–2)
BUN SERPL-MCNC: 99.6 MG/DL — HIGH (ref 8–20)
CALCIUM SERPL-MCNC: 10 MG/DL — SIGNIFICANT CHANGE UP (ref 8.4–10.5)
CHLORIDE SERPL-SCNC: 110 MMOL/L — HIGH (ref 96–108)
CO2 SERPL-SCNC: 14 MMOL/L — LOW (ref 22–29)
CREAT SERPL-MCNC: 10.34 MG/DL — HIGH (ref 0.5–1.3)
EGFR: 4 ML/MIN/1.73M2 — LOW
EGFR: 4 ML/MIN/1.73M2 — LOW
GLUCOSE BLDC GLUCOMTR-MCNC: 105 MG/DL — HIGH (ref 70–99)
GLUCOSE BLDC GLUCOMTR-MCNC: 159 MG/DL — HIGH (ref 70–99)
GLUCOSE SERPL-MCNC: 76 MG/DL — SIGNIFICANT CHANGE UP (ref 70–99)
HCT VFR BLD CALC: 21.2 % — LOW (ref 34.5–45)
HGB BLD-MCNC: 6.9 G/DL — CRITICAL LOW (ref 11.5–15.5)
MAGNESIUM SERPL-MCNC: 3 MG/DL — HIGH (ref 1.6–2.6)
MCHC RBC-ENTMCNC: 30 PG — SIGNIFICANT CHANGE UP (ref 27–34)
MCHC RBC-ENTMCNC: 32.5 G/DL — SIGNIFICANT CHANGE UP (ref 32–36)
MCV RBC AUTO: 92.2 FL — SIGNIFICANT CHANGE UP (ref 80–100)
NRBC # BLD AUTO: 0 K/UL — SIGNIFICANT CHANGE UP (ref 0–0)
NRBC # FLD: 0 K/UL — SIGNIFICANT CHANGE UP (ref 0–0)
NRBC BLD AUTO-RTO: 0 /100 WBCS — SIGNIFICANT CHANGE UP (ref 0–0)
PLATELET # BLD AUTO: 113 K/UL — LOW (ref 150–400)
PMV BLD: 13.5 FL — HIGH (ref 7–13)
POTASSIUM SERPL-MCNC: 5.3 MMOL/L — SIGNIFICANT CHANGE UP (ref 3.5–5.3)
POTASSIUM SERPL-SCNC: 5.3 MMOL/L — SIGNIFICANT CHANGE UP (ref 3.5–5.3)
PROT SERPL-MCNC: 6.5 G/DL — LOW (ref 6.6–8.7)
RBC # BLD: 2.3 M/UL — LOW (ref 3.8–5.2)
RBC # FLD: 15.4 % — HIGH (ref 10.3–14.5)
SODIUM SERPL-SCNC: 138 MMOL/L — SIGNIFICANT CHANGE UP (ref 135–145)
WBC # BLD: 5.91 K/UL — SIGNIFICANT CHANGE UP (ref 3.8–10.5)
WBC # FLD AUTO: 5.91 K/UL — SIGNIFICANT CHANGE UP (ref 3.8–10.5)

## 2025-08-23 PROCEDURE — 80053 COMPREHEN METABOLIC PANEL: CPT

## 2025-08-23 PROCEDURE — 86900 BLOOD TYPING SEROLOGIC ABO: CPT

## 2025-08-23 PROCEDURE — 82962 GLUCOSE BLOOD TEST: CPT

## 2025-08-23 PROCEDURE — 85027 COMPLETE CBC AUTOMATED: CPT

## 2025-08-23 PROCEDURE — 86901 BLOOD TYPING SEROLOGIC RH(D): CPT

## 2025-08-23 PROCEDURE — 93010 ELECTROCARDIOGRAM REPORT: CPT

## 2025-08-23 PROCEDURE — 80048 BASIC METABOLIC PNL TOTAL CA: CPT

## 2025-08-23 PROCEDURE — 74176 CT ABD & PELVIS W/O CONTRAST: CPT

## 2025-08-23 PROCEDURE — 86923 COMPATIBILITY TEST ELECTRIC: CPT

## 2025-08-23 PROCEDURE — 83735 ASSAY OF MAGNESIUM: CPT

## 2025-08-23 PROCEDURE — 85025 COMPLETE CBC W/AUTO DIFF WBC: CPT

## 2025-08-23 PROCEDURE — 99232 SBSQ HOSP IP/OBS MODERATE 35: CPT

## 2025-08-23 PROCEDURE — 86850 RBC ANTIBODY SCREEN: CPT

## 2025-08-23 PROCEDURE — 36415 COLL VENOUS BLD VENIPUNCTURE: CPT

## 2025-08-23 PROCEDURE — 74176 CT ABD & PELVIS W/O CONTRAST: CPT | Mod: 26

## 2025-08-23 RX ORDER — CARVEDILOL 3.12 MG/1
1 TABLET, FILM COATED ORAL
Refills: 0 | DISCHARGE

## 2025-08-23 RX ORDER — B1/B2/B3/B5/B6/B12/VIT C/FOLIC 500-0.5 MG
1 TABLET ORAL
Refills: 0 | DISCHARGE

## 2025-08-23 RX ORDER — SEVELAMER HYDROCHLORIDE 800 MG/1
800 TABLET ORAL THREE TIMES A DAY
Refills: 0 | Status: DISCONTINUED | OUTPATIENT
Start: 2025-08-23 | End: 2025-08-24

## 2025-08-23 RX ORDER — HYDROMORPHONE/SOD CHLOR,ISO/PF 2 MG/10 ML
1 SYRINGE (ML) INJECTION ONCE
Refills: 0 | Status: DISCONTINUED | OUTPATIENT
Start: 2025-08-23 | End: 2025-08-23

## 2025-08-23 RX ORDER — CARVEDILOL 3.12 MG/1
25 TABLET, FILM COATED ORAL EVERY 12 HOURS
Refills: 0 | Status: DISCONTINUED | OUTPATIENT
Start: 2025-08-23 | End: 2025-08-23

## 2025-08-23 RX ORDER — CARVEDILOL 3.12 MG/1
25 TABLET, FILM COATED ORAL EVERY 12 HOURS
Refills: 0 | Status: DISCONTINUED | OUTPATIENT
Start: 2025-08-23 | End: 2025-08-24

## 2025-08-23 RX ORDER — EPOETIN ALFA 10000 [IU]/ML
10000 SOLUTION INTRAVENOUS; SUBCUTANEOUS
Refills: 0 | Status: DISCONTINUED | OUTPATIENT
Start: 2025-08-23 | End: 2025-08-24

## 2025-08-23 RX ORDER — B1/B2/B3/B5/B6/B12/VIT C/FOLIC 500-0.5 MG
1 TABLET ORAL DAILY
Refills: 0 | Status: DISCONTINUED | OUTPATIENT
Start: 2025-08-23 | End: 2025-08-24

## 2025-08-23 RX ORDER — SEVELAMER HYDROCHLORIDE 800 MG/1
1 TABLET ORAL
Refills: 0 | DISCHARGE

## 2025-08-23 RX ADMIN — Medication 1 MILLIGRAM(S): at 14:46

## 2025-08-23 RX ADMIN — Medication 1 MILLIGRAM(S): at 13:52

## 2025-08-23 RX ADMIN — SEVELAMER HYDROCHLORIDE 800 MILLIGRAM(S): 800 TABLET ORAL at 17:46

## 2025-08-23 RX ADMIN — Medication 650 MILLIGRAM(S): at 13:15

## 2025-08-23 RX ADMIN — EPOETIN ALFA 10000 UNIT(S): 10000 SOLUTION INTRAVENOUS; SUBCUTANEOUS at 16:14

## 2025-08-23 RX ADMIN — Medication 1 TABLET(S): at 17:46

## 2025-08-23 RX ADMIN — SEVELAMER HYDROCHLORIDE 800 MILLIGRAM(S): 800 TABLET ORAL at 05:15

## 2025-08-23 RX ADMIN — Medication 650 MILLIGRAM(S): at 14:15

## 2025-08-23 RX ADMIN — CARVEDILOL 25 MILLIGRAM(S): 3.12 TABLET, FILM COATED ORAL at 17:46

## 2025-08-23 RX ADMIN — CARVEDILOL 25 MILLIGRAM(S): 3.12 TABLET, FILM COATED ORAL at 05:13

## 2025-08-24 VITALS
SYSTOLIC BLOOD PRESSURE: 153 MMHG | WEIGHT: 110.89 LBS | HEART RATE: 73 BPM | TEMPERATURE: 98 F | RESPIRATION RATE: 17 BRPM | DIASTOLIC BLOOD PRESSURE: 87 MMHG | OXYGEN SATURATION: 99 %

## 2025-08-24 LAB
HCT VFR BLD CALC: 24.8 % — LOW (ref 34.5–45)
HGB BLD-MCNC: 8.6 G/DL — LOW (ref 11.5–15.5)
MCHC RBC-ENTMCNC: 30.3 PG — SIGNIFICANT CHANGE UP (ref 27–34)
MCHC RBC-ENTMCNC: 34.7 G/DL — SIGNIFICANT CHANGE UP (ref 32–36)
MCV RBC AUTO: 87.3 FL — SIGNIFICANT CHANGE UP (ref 80–100)
NRBC # BLD AUTO: 0 K/UL — SIGNIFICANT CHANGE UP (ref 0–0)
NRBC # FLD: 0 K/UL — SIGNIFICANT CHANGE UP (ref 0–0)
NRBC BLD AUTO-RTO: 0 /100 WBCS — SIGNIFICANT CHANGE UP (ref 0–0)
PLATELET # BLD AUTO: 113 K/UL — LOW (ref 150–400)
PMV BLD: 12.7 FL — SIGNIFICANT CHANGE UP (ref 7–13)
RBC # BLD: 2.84 M/UL — LOW (ref 3.8–5.2)
RBC # FLD: 14.8 % — HIGH (ref 10.3–14.5)
WBC # BLD: 5.76 K/UL — SIGNIFICANT CHANGE UP (ref 3.8–10.5)
WBC # FLD AUTO: 5.76 K/UL — SIGNIFICANT CHANGE UP (ref 3.8–10.5)

## 2025-08-24 PROCEDURE — 80048 BASIC METABOLIC PNL TOTAL CA: CPT

## 2025-08-24 PROCEDURE — 86900 BLOOD TYPING SEROLOGIC ABO: CPT

## 2025-08-24 PROCEDURE — 96375 TX/PRO/DX INJ NEW DRUG ADDON: CPT

## 2025-08-24 PROCEDURE — P9016: CPT

## 2025-08-24 PROCEDURE — 85027 COMPLETE CBC AUTOMATED: CPT

## 2025-08-24 PROCEDURE — 96374 THER/PROPH/DIAG INJ IV PUSH: CPT

## 2025-08-24 PROCEDURE — 85025 COMPLETE CBC W/AUTO DIFF WBC: CPT

## 2025-08-24 PROCEDURE — 76856 US EXAM PELVIC COMPLETE: CPT

## 2025-08-24 PROCEDURE — 80053 COMPREHEN METABOLIC PANEL: CPT

## 2025-08-24 PROCEDURE — 83735 ASSAY OF MAGNESIUM: CPT

## 2025-08-24 PROCEDURE — 36415 COLL VENOUS BLD VENIPUNCTURE: CPT

## 2025-08-24 PROCEDURE — 99239 HOSP IP/OBS DSCHRG MGMT >30: CPT

## 2025-08-24 PROCEDURE — 82962 GLUCOSE BLOOD TEST: CPT

## 2025-08-24 PROCEDURE — 99291 CRITICAL CARE FIRST HOUR: CPT | Mod: 25

## 2025-08-24 PROCEDURE — 36430 TRANSFUSION BLD/BLD COMPNT: CPT

## 2025-08-24 PROCEDURE — 86850 RBC ANTIBODY SCREEN: CPT

## 2025-08-24 PROCEDURE — 93005 ELECTROCARDIOGRAM TRACING: CPT

## 2025-08-24 PROCEDURE — 74176 CT ABD & PELVIS W/O CONTRAST: CPT

## 2025-08-24 PROCEDURE — 86901 BLOOD TYPING SEROLOGIC RH(D): CPT

## 2025-08-24 PROCEDURE — 86923 COMPATIBILITY TEST ELECTRIC: CPT

## 2025-08-24 PROCEDURE — T1013: CPT

## 2025-08-24 PROCEDURE — 76856 US EXAM PELVIC COMPLETE: CPT | Mod: 26

## 2025-08-24 RX ADMIN — CARVEDILOL 25 MILLIGRAM(S): 3.12 TABLET, FILM COATED ORAL at 05:39

## 2025-08-24 RX ADMIN — Medication 1 TABLET(S): at 13:32

## 2025-08-24 RX ADMIN — SEVELAMER HYDROCHLORIDE 800 MILLIGRAM(S): 800 TABLET ORAL at 05:39

## 2025-08-24 RX ADMIN — SEVELAMER HYDROCHLORIDE 800 MILLIGRAM(S): 800 TABLET ORAL at 13:32
